# Patient Record
Sex: FEMALE | Race: WHITE | NOT HISPANIC OR LATINO | Employment: OTHER | URBAN - METROPOLITAN AREA
[De-identification: names, ages, dates, MRNs, and addresses within clinical notes are randomized per-mention and may not be internally consistent; named-entity substitution may affect disease eponyms.]

---

## 2018-12-23 ENCOUNTER — HOSPITAL ENCOUNTER (OUTPATIENT)
Dept: URGENT CARE | Facility: CLINIC | Age: 63
Setting detail: SPECIMEN
Discharge: HOME | End: 2018-12-23
Attending: FAMILY MEDICINE | Admitting: FAMILY MEDICINE

## 2018-12-23 LAB
AMPICILLIN SUSC ISLT: NORMAL
AZTREONAM SUSC ISLT: NORMAL
AZTREONAM SUSC ISLT: NORMAL
BACTERIA ISLT: NORMAL
BACTERIA ISLT: NORMAL
BACTERIA SPEC AEROBE CULT: NORMAL
BACTERIA SPEC AEROBE CULT: NORMAL
CEFAZOLIN SUSC ISLT: NORMAL
CEFAZOLIN SUSC ISLT: NORMAL
CEFEPIME SUSC ISLT: NORMAL
CEFEPIME SUSC ISLT: NORMAL
CEFTRIAXONE SUSC ISLT: NORMAL
CEFTRIAXONE SUSC ISLT: NORMAL
CIPROFLOXACIN SUSC ISLT: NORMAL
CIPROFLOXACIN SUSC ISLT: NORMAL
COLONY COUNT: NORMAL
LEVOFLOXACIN SUSC ISLT: NORMAL
LEVOFLOXACIN SUSC ISLT: NORMAL
Lab: NORMAL
MEROPENEM SUSC ISLT: NORMAL
MEROPENEM SUSC ISLT: NORMAL
MICRO REPORT STATUS: NORMAL
NITROFURANTOIN SUSC ISLT: NORMAL
NITROFURANTOIN SUSC ISLT: NORMAL
PIP+TAZO SUSC ISLT: NORMAL
PIP+TAZO SUSC ISLT: NORMAL
SPECIMEN SOURCE: NORMAL
SUSC METH SPEC: NORMAL
SUSC METH SPEC: NORMAL
TETRACYCLINE SUSC ISLT: NORMAL
TETRACYCLINE SUSC ISLT: NORMAL
TOBRAMYCIN SUSC ISLT: NORMAL
TOBRAMYCIN SUSC ISLT: NORMAL
TRIMETHOPRIM/SULFA: NORMAL
TRIMETHOPRIM/SULFA: NORMAL

## 2019-01-24 ENCOUNTER — HOSPITAL ENCOUNTER (OUTPATIENT)
Dept: OTHER | Facility: HOSPITAL | Age: 64
Setting detail: SPECIMEN
Discharge: HOME | End: 2019-01-24
Attending: FAMILY MEDICINE | Admitting: FAMILY MEDICINE

## 2019-01-24 LAB
ALBUMIN SERPL-MCNC: 4.2 G/DL (ref 3.5–4.8)
ALBUMIN/GLOB SERPL: 1.8 {RATIO} (ref 1–1.7)
ALP SERPL-CCNC: 69 IU/L (ref 32–91)
ALT SERPL-CCNC: 22 IU/L (ref 14–54)
ANION GAP SERPL CALC-SCNC: 13.8 MMOL/L (ref 10–20)
AST SERPL-CCNC: 27 IU/L (ref 15–41)
BASOPHILS # BLD AUTO: 0 10*3/UL (ref 0–0.2)
BASOPHILS NFR BLD AUTO: 1 % (ref 0–2)
BILIRUB SERPL-MCNC: 1.2 MG/DL (ref 0.3–1.2)
BUN SERPL-MCNC: 13 MG/DL (ref 8–20)
BUN/CREAT SERPL: 18.6 (ref 5.4–26.2)
CALCIUM SERPL-MCNC: 9.2 MG/DL (ref 8.9–10.3)
CHLORIDE SERPL-SCNC: 100 MMOL/L (ref 101–111)
CONV CO2: 26 MMOL/L (ref 22–32)
CONV TOTAL PROTEIN: 6.6 G/DL (ref 6.1–7.9)
CREAT UR-MCNC: 0.7 MG/DL (ref 0.4–1)
DIFFERENTIAL METHOD BLD: (no result)
EOSINOPHIL # BLD AUTO: 0.1 10*3/UL (ref 0–0.3)
EOSINOPHIL # BLD AUTO: 2 % (ref 0–3)
ERYTHROCYTE [DISTWIDTH] IN BLOOD BY AUTOMATED COUNT: 14.4 % (ref 11.5–14.5)
GLOBULIN UR ELPH-MCNC: 2.4 G/DL (ref 2.5–3.8)
GLUCOSE SERPL-MCNC: 154 MG/DL (ref 65–99)
HCT VFR BLD AUTO: 40.8 % (ref 35–49)
HGB BLD-MCNC: 13.4 G/DL (ref 12–15)
LYMPHOCYTES # BLD AUTO: 1.1 10*3/UL (ref 0.8–4.8)
LYMPHOCYTES NFR BLD AUTO: 22 % (ref 18–42)
MAGNESIUM SERPL-MCNC: 1.8 MG/DL (ref 1.8–2.5)
MCH RBC QN AUTO: 27.4 PG (ref 26–32)
MCHC RBC AUTO-ENTMCNC: 32.9 G/DL (ref 32–36)
MCV RBC AUTO: 83.3 FL (ref 80–94)
MONOCYTES # BLD AUTO: 0.3 10*3/UL (ref 0.1–1.3)
MONOCYTES NFR BLD AUTO: 6 % (ref 2–11)
NEUTROPHILS # BLD AUTO: 3.5 10*3/UL (ref 2.3–8.6)
NEUTROPHILS NFR BLD AUTO: 69 % (ref 50–75)
NRBC BLD AUTO-RTO: 0 /100{WBCS}
NRBC/RBC NFR BLD MANUAL: 0 10*3/UL
PLATELET # BLD AUTO: 194 10*3/UL (ref 150–450)
PMV BLD AUTO: 9.4 FL (ref 7.4–10.4)
POTASSIUM SERPL-SCNC: 3.8 MMOL/L (ref 3.6–5.1)
RBC # BLD AUTO: 4.9 10*6/UL (ref 4–5.4)
SODIUM SERPL-SCNC: 136 MMOL/L (ref 136–144)
WBC # BLD AUTO: 5 10*3/UL (ref 4.5–11.5)

## 2019-02-05 ENCOUNTER — HOSPITAL ENCOUNTER (OUTPATIENT)
Dept: OTHER | Facility: HOSPITAL | Age: 64
Discharge: HOME | End: 2019-02-05
Attending: FAMILY MEDICINE | Admitting: FAMILY MEDICINE

## 2019-03-01 ENCOUNTER — OFFICE (AMBULATORY)
Dept: URBAN - METROPOLITAN AREA PATHOLOGY 4 | Facility: PATHOLOGY | Age: 64
End: 2019-03-01

## 2019-03-01 ENCOUNTER — ON CAMPUS - OUTPATIENT (AMBULATORY)
Dept: URBAN - METROPOLITAN AREA HOSPITAL 2 | Facility: HOSPITAL | Age: 64
End: 2019-03-01

## 2019-03-01 ENCOUNTER — HOSPITAL ENCOUNTER (OUTPATIENT)
Dept: OTHER | Facility: HOSPITAL | Age: 64
Setting detail: SPECIMEN
Discharge: HOME | End: 2019-03-01
Attending: INTERNAL MEDICINE | Admitting: INTERNAL MEDICINE

## 2019-03-01 VITALS
DIASTOLIC BLOOD PRESSURE: 60 MMHG | SYSTOLIC BLOOD PRESSURE: 116 MMHG | SYSTOLIC BLOOD PRESSURE: 122 MMHG | DIASTOLIC BLOOD PRESSURE: 65 MMHG | DIASTOLIC BLOOD PRESSURE: 78 MMHG | WEIGHT: 135 LBS | OXYGEN SATURATION: 98 % | SYSTOLIC BLOOD PRESSURE: 101 MMHG | OXYGEN SATURATION: 99 % | SYSTOLIC BLOOD PRESSURE: 132 MMHG | OXYGEN SATURATION: 94 % | DIASTOLIC BLOOD PRESSURE: 59 MMHG | DIASTOLIC BLOOD PRESSURE: 61 MMHG | HEART RATE: 80 BPM | RESPIRATION RATE: 17 BRPM | HEART RATE: 75 BPM | HEART RATE: 89 BPM | DIASTOLIC BLOOD PRESSURE: 68 MMHG | SYSTOLIC BLOOD PRESSURE: 156 MMHG | OXYGEN SATURATION: 100 % | HEART RATE: 79 BPM | TEMPERATURE: 97.7 F | HEART RATE: 96 BPM | DIASTOLIC BLOOD PRESSURE: 72 MMHG | RESPIRATION RATE: 16 BRPM | OXYGEN SATURATION: 95 % | DIASTOLIC BLOOD PRESSURE: 66 MMHG | OXYGEN SATURATION: 97 % | SYSTOLIC BLOOD PRESSURE: 96 MMHG | SYSTOLIC BLOOD PRESSURE: 118 MMHG | SYSTOLIC BLOOD PRESSURE: 125 MMHG | HEIGHT: 63 IN

## 2019-03-01 DIAGNOSIS — K63.5 POLYP OF COLON: ICD-10-CM

## 2019-03-01 DIAGNOSIS — Z80.0 FAMILY HISTORY OF MALIGNANT NEOPLASM OF DIGESTIVE ORGANS: ICD-10-CM

## 2019-03-01 DIAGNOSIS — K63.3 ULCER OF INTESTINE: ICD-10-CM

## 2019-03-01 DIAGNOSIS — K62.1 RECTAL POLYP: ICD-10-CM

## 2019-03-01 DIAGNOSIS — K64.1 SECOND DEGREE HEMORRHOIDS: ICD-10-CM

## 2019-03-01 PROBLEM — D12.4 BENIGN NEOPLASM OF DESCENDING COLON: Status: ACTIVE | Noted: 2019-03-01

## 2019-03-01 LAB
GI HISTOLOGY: A. UNSPECIFIED: (no result)
GI HISTOLOGY: B. UNSPECIFIED: (no result)
GI HISTOLOGY: C. UNSPECIFIED: (no result)
GI HISTOLOGY: PDF REPORT: (no result)

## 2019-03-01 PROCEDURE — 45380 COLONOSCOPY AND BIOPSY: CPT | Mod: 59,PT | Performed by: INTERNAL MEDICINE

## 2019-03-01 PROCEDURE — 45380 COLONOSCOPY AND BIOPSY: CPT | Mod: PT,59 | Performed by: INTERNAL MEDICINE

## 2019-03-01 PROCEDURE — 45385 COLONOSCOPY W/LESION REMOVAL: CPT | Mod: PT | Performed by: INTERNAL MEDICINE

## 2019-03-01 PROCEDURE — 88305 TISSUE EXAM BY PATHOLOGIST: CPT | Mod: 26 | Performed by: INTERNAL MEDICINE

## 2019-04-24 ENCOUNTER — HOSPITAL ENCOUNTER (OUTPATIENT)
Dept: OTHER | Facility: HOSPITAL | Age: 64
Setting detail: SPECIMEN
Discharge: HOME | End: 2019-04-24
Attending: NURSE PRACTITIONER | Admitting: NURSE PRACTITIONER

## 2019-04-24 LAB
BASOPHILS # BLD AUTO: 0 10*3/UL (ref 0–0.2)
BASOPHILS NFR BLD AUTO: 0 % (ref 0–2)
DIFFERENTIAL METHOD BLD: (no result)
EOSINOPHIL # BLD AUTO: 0.1 10*3/UL (ref 0–0.3)
EOSINOPHIL # BLD AUTO: 2 % (ref 0–3)
ERYTHROCYTE [DISTWIDTH] IN BLOOD BY AUTOMATED COUNT: 14 % (ref 11.5–14.5)
HCT VFR BLD AUTO: 40.9 % (ref 35–49)
HGB BLD-MCNC: 13.8 G/DL (ref 12–15)
LYMPHOCYTES # BLD AUTO: 1.4 10*3/UL (ref 0.8–4.8)
LYMPHOCYTES NFR BLD AUTO: 25 % (ref 18–42)
MCH RBC QN AUTO: 27.7 PG (ref 26–32)
MCHC RBC AUTO-ENTMCNC: 33.6 G/DL (ref 32–36)
MCV RBC AUTO: 82.5 FL (ref 80–94)
MONOCYTES # BLD AUTO: 0.4 10*3/UL (ref 0.1–1.3)
MONOCYTES NFR BLD AUTO: 6 % (ref 2–11)
NEUTROPHILS # BLD AUTO: 3.9 10*3/UL (ref 2.3–8.6)
NEUTROPHILS NFR BLD AUTO: 67 % (ref 50–75)
NRBC BLD AUTO-RTO: 0 /100{WBCS}
NRBC/RBC NFR BLD MANUAL: 0 10*3/UL
PLATELET # BLD AUTO: 218 10*3/UL (ref 150–450)
PMV BLD AUTO: 9.5 FL (ref 7.4–10.4)
RBC # BLD AUTO: 4.96 10*6/UL (ref 4–5.4)
WBC # BLD AUTO: 5.8 10*3/UL (ref 4.5–11.5)

## 2019-04-29 ENCOUNTER — HOSPITAL ENCOUNTER (OUTPATIENT)
Dept: ORTHOPEDIC SURGERY | Facility: CLINIC | Age: 64
Discharge: HOME | End: 2019-04-29
Attending: NEUROLOGICAL SURGERY | Admitting: NEUROLOGICAL SURGERY

## 2019-06-25 RX ORDER — NITROFURANTOIN 25; 75 MG/1; MG/1
100 CAPSULE ORAL 2 TIMES DAILY
Qty: 14 CAPSULE | Refills: 1 | Status: SHIPPED | OUTPATIENT
Start: 2019-06-25 | End: 2019-07-05

## 2019-06-25 RX ORDER — NITROFURANTOIN 25; 75 MG/1; MG/1
CAPSULE ORAL
Refills: 0 | Status: CANCELLED | OUTPATIENT
Start: 2019-06-25

## 2019-06-25 RX ORDER — NITROFURANTOIN 25; 75 MG/1; MG/1
CAPSULE ORAL
Refills: 0 | COMMUNITY
Start: 2019-04-17 | End: 2019-06-25 | Stop reason: SDUPTHER

## 2019-07-05 ENCOUNTER — OFFICE VISIT (OUTPATIENT)
Dept: FAMILY MEDICINE CLINIC | Facility: CLINIC | Age: 64
End: 2019-07-05

## 2019-07-05 ENCOUNTER — TELEPHONE (OUTPATIENT)
Dept: FAMILY MEDICINE CLINIC | Facility: CLINIC | Age: 64
End: 2019-07-05

## 2019-07-05 VITALS
BODY MASS INDEX: 24.2 KG/M2 | DIASTOLIC BLOOD PRESSURE: 84 MMHG | SYSTOLIC BLOOD PRESSURE: 161 MMHG | HEART RATE: 82 BPM | WEIGHT: 136.6 LBS | OXYGEN SATURATION: 98 % | HEIGHT: 63 IN

## 2019-07-05 DIAGNOSIS — R30.0 DYSURIA: Primary | ICD-10-CM

## 2019-07-05 DIAGNOSIS — R53.82 CHRONIC FATIGUE: ICD-10-CM

## 2019-07-05 DIAGNOSIS — Z00.00 ENCOUNTER FOR GENERAL ADULT MEDICAL EXAMINATION WITHOUT ABNORMAL FINDINGS: ICD-10-CM

## 2019-07-05 DIAGNOSIS — R73.03 PREDIABETES: ICD-10-CM

## 2019-07-05 DIAGNOSIS — E78.2 MIXED HYPERLIPIDEMIA: ICD-10-CM

## 2019-07-05 DIAGNOSIS — N39.0 RECURRENT UTI: ICD-10-CM

## 2019-07-05 DIAGNOSIS — E03.9 ACQUIRED HYPOTHYROIDISM: ICD-10-CM

## 2019-07-05 PROBLEM — N95.2 VAGINAL ATROPHY: Status: ACTIVE | Noted: 2017-11-01

## 2019-07-05 PROBLEM — M79.7 FIBROMYOSITIS: Status: ACTIVE | Noted: 2018-08-02

## 2019-07-05 PROBLEM — M54.17 LUMBOSACRAL RADICULOPATHY: Status: ACTIVE | Noted: 2019-03-27

## 2019-07-05 PROBLEM — M51.36 DEGENERATION OF INTERVERTEBRAL DISC OF LUMBAR REGION: Status: ACTIVE | Noted: 2019-04-29

## 2019-07-05 PROBLEM — M19.90 DEGENERATIVE ARTHRITIS: Status: ACTIVE | Noted: 2018-08-22

## 2019-07-05 PROBLEM — F32.A DEPRESSION: Status: ACTIVE | Noted: 2019-07-05

## 2019-07-05 PROBLEM — Z80.0 FAMILY HISTORY OF COLON CANCER: Status: ACTIVE | Noted: 2019-02-21

## 2019-07-05 PROBLEM — I47.1 SVT (SUPRAVENTRICULAR TACHYCARDIA) (HCC): Status: ACTIVE | Noted: 2019-07-05

## 2019-07-05 PROBLEM — R10.32 ABDOMINAL PAIN, LLQ: Status: ACTIVE | Noted: 2019-02-21

## 2019-07-05 PROBLEM — I47.10 SVT (SUPRAVENTRICULAR TACHYCARDIA): Status: ACTIVE | Noted: 2019-07-05

## 2019-07-05 PROBLEM — M51.369 DEGENERATION OF INTERVERTEBRAL DISC OF LUMBAR REGION: Status: ACTIVE | Noted: 2019-04-29

## 2019-07-05 PROBLEM — K46.9 ABDOMINAL HERNIA: Status: ACTIVE | Noted: 2019-07-05

## 2019-07-05 PROBLEM — F41.9 ANXIETY DISORDER: Status: ACTIVE | Noted: 2019-07-05

## 2019-07-05 PROBLEM — M54.40 ACUTE BACK PAIN WITH SCIATICA: Status: ACTIVE | Noted: 2018-08-22

## 2019-07-05 PROBLEM — R92.8 OTHER ABNORMAL AND INCONCLUSIVE FINDINGS ON DIAGNOSTIC IMAGING OF BREAST: Status: ACTIVE | Noted: 2018-10-10

## 2019-07-05 PROBLEM — J30.2 SEASONAL ALLERGIES: Status: ACTIVE | Noted: 2019-07-05

## 2019-07-05 PROBLEM — G56.00 CARPAL TUNNEL SYNDROME: Status: ACTIVE | Noted: 2017-10-20

## 2019-07-05 PROBLEM — M19.90 ARTHRITIS: Status: ACTIVE | Noted: 2019-02-05

## 2019-07-05 PROBLEM — I77.9 CAROTID ARTERIAL DISEASE (HCC): Status: ACTIVE | Noted: 2019-04-24

## 2019-07-05 LAB
BILIRUB BLD-MCNC: NEGATIVE MG/DL
CLARITY, POC: CLEAR
COLOR UR: ABNORMAL
GLUCOSE UR STRIP-MCNC: NEGATIVE MG/DL
KETONES UR QL: NEGATIVE
LEUKOCYTE EST, POC: ABNORMAL
NITRITE UR-MCNC: POSITIVE MG/ML
PH UR: 7.5 [PH] (ref 5–8)
PROT UR STRIP-MCNC: NEGATIVE MG/DL
RBC # UR STRIP: ABNORMAL /UL
SP GR UR: 1.01 (ref 1–1.03)
UROBILINOGEN UR QL: NORMAL

## 2019-07-05 PROCEDURE — 87086 URINE CULTURE/COLONY COUNT: CPT | Performed by: FAMILY MEDICINE

## 2019-07-05 PROCEDURE — 99214 OFFICE O/P EST MOD 30 MIN: CPT | Performed by: FAMILY MEDICINE

## 2019-07-05 PROCEDURE — 81002 URINALYSIS NONAUTO W/O SCOPE: CPT | Performed by: FAMILY MEDICINE

## 2019-07-05 PROCEDURE — 87186 SC STD MICRODIL/AGAR DIL: CPT | Performed by: FAMILY MEDICINE

## 2019-07-05 RX ORDER — METFORMIN HYDROCHLORIDE 500 MG/1
500 TABLET, EXTENDED RELEASE ORAL 2 TIMES DAILY
Refills: 0 | COMMUNITY
Start: 2019-05-31 | End: 2019-11-08 | Stop reason: SDUPTHER

## 2019-07-05 RX ORDER — DULOXETIN HYDROCHLORIDE 60 MG/1
60 CAPSULE, DELAYED RELEASE ORAL DAILY
Refills: 0 | COMMUNITY
Start: 2019-04-15 | End: 2019-10-01 | Stop reason: SDUPTHER

## 2019-07-05 RX ORDER — TRAMADOL HYDROCHLORIDE 50 MG/1
TABLET ORAL
Refills: 0 | COMMUNITY
Start: 2019-06-10 | End: 2019-12-23

## 2019-07-05 RX ORDER — AMOXICILLIN AND CLAVULANATE POTASSIUM 500; 125 MG/1; MG/1
1 TABLET, FILM COATED ORAL 2 TIMES DAILY
Qty: 20 TABLET | Refills: 1 | Status: SHIPPED | OUTPATIENT
Start: 2019-07-05 | End: 2019-07-09 | Stop reason: ALTCHOICE

## 2019-07-05 RX ORDER — ATORVASTATIN CALCIUM 40 MG/1
TABLET, FILM COATED ORAL
Refills: 1 | COMMUNITY
Start: 2019-06-15 | End: 2020-01-10 | Stop reason: SDUPTHER

## 2019-07-05 RX ORDER — PANTOPRAZOLE SODIUM 40 MG/1
TABLET, DELAYED RELEASE ORAL
Refills: 0 | COMMUNITY
Start: 2019-04-18 | End: 2019-08-23

## 2019-07-05 RX ORDER — PHENAZOPYRIDINE HYDROCHLORIDE 100 MG/1
100 TABLET, FILM COATED ORAL 3 TIMES DAILY PRN
Refills: 0 | COMMUNITY
Start: 2019-04-17 | End: 2020-07-01

## 2019-07-05 RX ORDER — LANCETS 33 GAUGE
EACH MISCELLANEOUS
COMMUNITY
Start: 2018-08-02 | End: 2023-02-16

## 2019-07-05 RX ORDER — TRIAMCINOLONE ACETONIDE 0.1 %
1 PASTE (GRAM) DENTAL
COMMUNITY
Start: 2017-01-25

## 2019-07-05 RX ORDER — FLUCONAZOLE 200 MG/1
200 TABLET ORAL DAILY
Refills: 0 | COMMUNITY
Start: 2019-04-28 | End: 2019-10-11

## 2019-07-05 RX ORDER — MULTIVITAMIN
TABLET ORAL
COMMUNITY
Start: 2013-12-22

## 2019-07-05 NOTE — TELEPHONE ENCOUNTER
Patient wants me to remind you that you were calling in an antibiotic for her to  today for the infection.

## 2019-07-05 NOTE — PROGRESS NOTES
"Subjective   Emilee Gandara is a 63 y.o. female.     Pt in for F/U recurrent UTI's w current Dysuria and Frequency.  Also having problems w chronic low back pain for DDD.  Also c/o worsening of chronic Fatigue.    /84   Pulse 82   Ht 160 cm (62.99\")   Wt 62 kg (136 lb 9.6 oz)   SpO2 98%   BMI 24.20 kg/m²      The following portions of the patient's history were reviewed and updated as appropriate: allergies, current medications, past family history, past medical history, past social history, past surgical history and problem list.    Review of Systems   Constitutional: Positive for fatigue.   HENT: Negative.    Eyes: Negative.    Respiratory: Negative.    Cardiovascular: Positive for palpitations. Negative for chest pain and leg swelling.        HBP/HLD.  Hx of Arrythmia.   Gastrointestinal: Negative.         Colonoscopy UTD.   Endocrine: Negative.    Genitourinary: Positive for dysuria and frequency.        Hx of recurrent UTI.   Musculoskeletal: Positive for arthralgias, back pain and myalgias.   Skin: Negative.    Allergic/Immunologic: Negative.    Neurological: Negative.    Hematological: Negative.    Psychiatric/Behavioral: Negative.        Objective   Physical Exam   Constitutional: She is oriented to person, place, and time. She appears well-developed and well-nourished. No distress.   HENT:   Head: Normocephalic and atraumatic.   Nose: Nose normal.   Mouth/Throat: Oropharynx is clear and moist.   Eyes: Conjunctivae and EOM are normal. Pupils are equal, round, and reactive to light.   Neck: Normal range of motion. Neck supple. No thyromegaly present.   Cardiovascular: Normal rate, regular rhythm, normal heart sounds and intact distal pulses. Exam reveals no gallop.   Pulmonary/Chest: Effort normal. No respiratory distress. She has no wheezes. She has no rales.   Abdominal: Soft. Bowel sounds are normal. She exhibits no distension and no mass. There is no tenderness. No hernia.   Genitourinary: "   Genitourinary Comments: Mild suprapubic tenderness.   Musculoskeletal: She exhibits tenderness.   Mod LS tenderness and pain on ROM.  LE Str and Sens intact.   Lymphadenopathy:     She has no cervical adenopathy.   Neurological: She is alert and oriented to person, place, and time. No cranial nerve deficit or sensory deficit. She exhibits normal muscle tone.   Skin: Skin is warm and dry. Capillary refill takes 2 to 3 seconds. Rash noted.   Psychiatric: She has a normal mood and affect. Her behavior is normal. Judgment and thought content normal.       Assessment/Plan   Emilee was seen today for urinary tract infection.    Diagnoses and all orders for this visit:    Dysuria  -     POC Urinalysis Dipstick  -     Urine Culture - Urine, Urine, Random Void  -     Ambulatory Referral to Urology    Recurrent UTI  -     Ambulatory Referral to Urology    Encounter for general adult medical examination without abnormal findings  -     CBC & Differential  -     Comprehensive Metabolic Panel  -     Hemoglobin A1c  -     Lipid Panel  -     T4, Free  -     TSH  -     Vitamin D 1,25 Dihydroxy  -     Vitamin B12  -     CBC Auto Differential    Chronic fatigue  -     CBC & Differential  -     Comprehensive Metabolic Panel  -     Hemoglobin A1c  -     Lipid Panel  -     T4, Free  -     TSH  -     Vitamin D 1,25 Dihydroxy  -     Vitamin B12  -     CBC Auto Differential    Mixed hyperlipidemia  -     Comprehensive Metabolic Panel  -     Lipid Panel    Acquired hypothyroidism  -     T4, Free  -     TSH    Prediabetes   -     Hemoglobin A1c    Other orders  -     amoxicillin-clavulanate (AUGMENTIN) 500-125 MG per tablet; Take 1 tablet by mouth 2 (Two) Times a Day.

## 2019-07-05 NOTE — PATIENT INSTRUCTIONS
Ck Labs as noted.  F/U as indicated. Meds reviewed w adjust prn.  Urology referral for recurrent UTI's.  PM/Inj F/U for Chronic Back Pain.  ? Ortho Referral for R Shouder Pain and Clicking.  [facility] F/U prn or in 3 mo.

## 2019-07-07 LAB — BACTERIA SPEC AEROBE CULT: ABNORMAL

## 2019-07-09 ENCOUNTER — TELEPHONE (OUTPATIENT)
Dept: FAMILY MEDICINE CLINIC | Facility: CLINIC | Age: 64
End: 2019-07-09

## 2019-07-09 RX ORDER — CEPHALEXIN 500 MG/1
500 CAPSULE ORAL 3 TIMES DAILY
Qty: 21 CAPSULE | Refills: 1 | Status: SHIPPED | OUTPATIENT
Start: 2019-07-09 | End: 2019-10-11

## 2019-07-16 ENCOUNTER — OFFICE (AMBULATORY)
Dept: URBAN - METROPOLITAN AREA CLINIC 64 | Facility: CLINIC | Age: 64
End: 2019-07-16

## 2019-07-16 VITALS
SYSTOLIC BLOOD PRESSURE: 132 MMHG | HEIGHT: 63 IN | HEART RATE: 85 BPM | WEIGHT: 136 LBS | DIASTOLIC BLOOD PRESSURE: 79 MMHG

## 2019-07-16 DIAGNOSIS — R10.32 LEFT LOWER QUADRANT PAIN: ICD-10-CM

## 2019-07-16 DIAGNOSIS — K59.00 CONSTIPATION, UNSPECIFIED: ICD-10-CM

## 2019-07-16 DIAGNOSIS — R19.5 OTHER FECAL ABNORMALITIES: ICD-10-CM

## 2019-07-16 DIAGNOSIS — R10.31 RIGHT LOWER QUADRANT PAIN: ICD-10-CM

## 2019-07-16 PROCEDURE — 99214 OFFICE O/P EST MOD 30 MIN: CPT | Performed by: INTERNAL MEDICINE

## 2019-07-22 ENCOUNTER — TRANSCRIBE ORDERS (OUTPATIENT)
Dept: ADMINISTRATIVE | Facility: HOSPITAL | Age: 64
End: 2019-07-22

## 2019-07-22 DIAGNOSIS — R10.32 LEFT LOWER QUADRANT PAIN: Primary | ICD-10-CM

## 2019-07-24 ENCOUNTER — ON CAMPUS - OUTPATIENT (AMBULATORY)
Dept: URBAN - METROPOLITAN AREA HOSPITAL 2 | Facility: HOSPITAL | Age: 64
End: 2019-07-24

## 2019-07-24 ENCOUNTER — OFFICE (AMBULATORY)
Dept: URBAN - METROPOLITAN AREA PATHOLOGY 4 | Facility: PATHOLOGY | Age: 64
End: 2019-07-24

## 2019-07-24 VITALS
DIASTOLIC BLOOD PRESSURE: 71 MMHG | HEART RATE: 77 BPM | SYSTOLIC BLOOD PRESSURE: 128 MMHG | OXYGEN SATURATION: 97 % | DIASTOLIC BLOOD PRESSURE: 98 MMHG | HEART RATE: 94 BPM | SYSTOLIC BLOOD PRESSURE: 139 MMHG | OXYGEN SATURATION: 94 % | SYSTOLIC BLOOD PRESSURE: 122 MMHG | OXYGEN SATURATION: 95 % | OXYGEN SATURATION: 100 % | SYSTOLIC BLOOD PRESSURE: 181 MMHG | SYSTOLIC BLOOD PRESSURE: 115 MMHG | DIASTOLIC BLOOD PRESSURE: 77 MMHG | RESPIRATION RATE: 16 BRPM | HEIGHT: 63 IN | DIASTOLIC BLOOD PRESSURE: 84 MMHG | TEMPERATURE: 97.6 F | HEART RATE: 81 BPM | OXYGEN SATURATION: 98 % | WEIGHT: 136 LBS | DIASTOLIC BLOOD PRESSURE: 72 MMHG | HEART RATE: 82 BPM | RESPIRATION RATE: 18 BRPM | HEART RATE: 78 BPM | RESPIRATION RATE: 20 BRPM

## 2019-07-24 DIAGNOSIS — R11.0 NAUSEA: ICD-10-CM

## 2019-07-24 DIAGNOSIS — K31.89 OTHER DISEASES OF STOMACH AND DUODENUM: ICD-10-CM

## 2019-07-24 DIAGNOSIS — R19.5 OTHER FECAL ABNORMALITIES: ICD-10-CM

## 2019-07-24 DIAGNOSIS — K22.2 ESOPHAGEAL OBSTRUCTION: ICD-10-CM

## 2019-07-24 DIAGNOSIS — R10.12 LEFT UPPER QUADRANT PAIN: ICD-10-CM

## 2019-07-24 PROBLEM — K29.70 GASTRITIS, UNSPECIFIED, WITHOUT BLEEDING: Status: ACTIVE | Noted: 2019-07-24

## 2019-07-24 LAB
ALBUMIN SERPL-MCNC: 4.3 G/DL (ref 3.5–4.8)
ALBUMIN/GLOB SERPL: 1.5 G/DL (ref 1–1.7)
ALP SERPL-CCNC: 73 U/L (ref 32–91)
ALT SERPL W P-5'-P-CCNC: 24 U/L (ref 14–54)
ANION GAP SERPL CALCULATED.3IONS-SCNC: 14.7 MMOL/L (ref 5–15)
ARTICHOKE IGE QN: 90 MG/DL (ref 0–100)
AST SERPL-CCNC: 21 U/L (ref 15–41)
BASOPHILS # BLD AUTO: 0 10*3/MM3 (ref 0–0.2)
BASOPHILS NFR BLD AUTO: 0.3 % (ref 0–1.5)
BILIRUB SERPL-MCNC: 1.6 MG/DL (ref 0.3–1.2)
BUN BLD-MCNC: 10 MG/DL (ref 8–20)
BUN/CREAT SERPL: 16.7 (ref 5.4–26.2)
CALCIUM SPEC-SCNC: 9.5 MG/DL (ref 8.9–10.3)
CHLORIDE SERPL-SCNC: 104 MMOL/L (ref 101–111)
CHOLEST SERPL-MCNC: 164 MG/DL
CO2 SERPL-SCNC: 27 MMOL/L (ref 22–32)
CREAT BLD-MCNC: 0.6 MG/DL (ref 0.4–1)
DEPRECATED RDW RBC AUTO: 40.7 FL (ref 37–54)
EOSINOPHIL # BLD AUTO: 0.1 10*3/MM3 (ref 0–0.4)
EOSINOPHIL NFR BLD AUTO: 1.1 % (ref 0.3–6.2)
ERYTHROCYTE [DISTWIDTH] IN BLOOD BY AUTOMATED COUNT: 14 % (ref 12.3–15.4)
GFR SERPL CREATININE-BSD FRML MDRD: 100 ML/MIN/1.73
GI HISTOLOGY: A. SELECT: (no result)
GI HISTOLOGY: B. UNSPECIFIED: (no result)
GI HISTOLOGY: PDF REPORT: (no result)
GLOBULIN UR ELPH-MCNC: 2.8 GM/DL (ref 2.5–3.8)
GLUCOSE BLD-MCNC: 100 MG/DL (ref 65–99)
HCT VFR BLD AUTO: 41.9 % (ref 34–46.6)
HDLC SERPL QL: 2.56
HDLC SERPL-MCNC: 64 MG/DL
HGB BLD-MCNC: 14 G/DL (ref 12–15.9)
LDLC/HDLC SERPL: 1.35 {RATIO}
LYMPHOCYTES # BLD AUTO: 1.6 10*3/MM3 (ref 0.7–3.1)
LYMPHOCYTES NFR BLD AUTO: 21.1 % (ref 19.6–45.3)
MCH RBC QN AUTO: 27.8 PG (ref 26.6–33)
MCHC RBC AUTO-ENTMCNC: 33.4 G/DL (ref 31.5–35.7)
MCV RBC AUTO: 83.2 FL (ref 79–97)
MONOCYTES # BLD AUTO: 0.5 10*3/MM3 (ref 0.1–0.9)
MONOCYTES NFR BLD AUTO: 6.4 % (ref 5–12)
NEUTROPHILS # BLD AUTO: 5.6 10*3/MM3 (ref 1.7–7)
NEUTROPHILS NFR BLD AUTO: 71.1 % (ref 42.7–76)
NRBC BLD AUTO-RTO: 0 /100 WBC (ref 0–0.2)
PLATELET # BLD AUTO: 254 10*3/MM3 (ref 140–450)
PMV BLD AUTO: 9 FL (ref 6–12)
POTASSIUM BLD-SCNC: 4.7 MMOL/L (ref 3.6–5.1)
PROT SERPL-MCNC: 7.1 G/DL (ref 6.1–7.9)
RBC # BLD AUTO: 5.04 10*6/MM3 (ref 3.77–5.28)
SODIUM BLD-SCNC: 141 MMOL/L (ref 136–144)
T4 FREE SERPL-MCNC: 0.75 NG/DL (ref 0.58–1.64)
TRIGL SERPL-MCNC: 67 MG/DL
TSH SERPL DL<=0.05 MIU/L-ACNC: 0.54 MIU/ML (ref 0.34–5.6)
VIT B12 BLD-MCNC: 585 PG/ML (ref 180–914)
VLDLC SERPL-MCNC: 13.4 MG/DL
WBC NRBC COR # BLD: 7.8 10*3/MM3 (ref 3.4–10.8)

## 2019-07-24 PROCEDURE — 88305 TISSUE EXAM BY PATHOLOGIST: CPT | Performed by: INTERNAL MEDICINE

## 2019-07-24 PROCEDURE — 80053 COMPREHEN METABOLIC PANEL: CPT | Performed by: FAMILY MEDICINE

## 2019-07-24 PROCEDURE — 82652 VIT D 1 25-DIHYDROXY: CPT | Performed by: FAMILY MEDICINE

## 2019-07-24 PROCEDURE — 83036 HEMOGLOBIN GLYCOSYLATED A1C: CPT | Performed by: FAMILY MEDICINE

## 2019-07-24 PROCEDURE — 85025 COMPLETE CBC W/AUTO DIFF WBC: CPT | Performed by: FAMILY MEDICINE

## 2019-07-24 PROCEDURE — 84443 ASSAY THYROID STIM HORMONE: CPT | Performed by: FAMILY MEDICINE

## 2019-07-24 PROCEDURE — 43239 EGD BIOPSY SINGLE/MULTIPLE: CPT | Performed by: INTERNAL MEDICINE

## 2019-07-24 PROCEDURE — 80061 LIPID PANEL: CPT | Performed by: FAMILY MEDICINE

## 2019-07-24 PROCEDURE — 84439 ASSAY OF FREE THYROXINE: CPT | Performed by: FAMILY MEDICINE

## 2019-07-24 PROCEDURE — 88305 TISSUE EXAM BY PATHOLOGIST: CPT | Mod: 26 | Performed by: INTERNAL MEDICINE

## 2019-07-24 PROCEDURE — 43450 DILATE ESOPHAGUS 1/MULT PASS: CPT | Performed by: INTERNAL MEDICINE

## 2019-07-24 PROCEDURE — 82607 VITAMIN B-12: CPT | Performed by: FAMILY MEDICINE

## 2019-07-25 ENCOUNTER — LAB REQUISITION (OUTPATIENT)
Dept: LAB | Facility: HOSPITAL | Age: 64
End: 2019-07-25

## 2019-07-25 DIAGNOSIS — K29.70 GASTRITIS WITHOUT BLEEDING: ICD-10-CM

## 2019-07-25 DIAGNOSIS — R10.12 LEFT UPPER QUADRANT PAIN: ICD-10-CM

## 2019-07-25 DIAGNOSIS — R11.0 NAUSEA: ICD-10-CM

## 2019-07-25 DIAGNOSIS — K22.2 ESOPHAGEAL OBSTRUCTION: ICD-10-CM

## 2019-07-25 DIAGNOSIS — R19.5 OTHER FECAL ABNORMALITIES: ICD-10-CM

## 2019-07-25 LAB — HBA1C MFR BLD: 6.2 % (ref 3.5–5.6)

## 2019-07-26 ENCOUNTER — HOSPITAL ENCOUNTER (OUTPATIENT)
Dept: CT IMAGING | Facility: HOSPITAL | Age: 64
Discharge: HOME | End: 2019-07-26
Admitting: INTERNAL MEDICINE

## 2019-07-26 DIAGNOSIS — R10.32 LEFT LOWER QUADRANT PAIN: ICD-10-CM

## 2019-07-26 LAB
LAB AP CASE REPORT: NORMAL
PATH REPORT.FINAL DX SPEC: NORMAL
PATH REPORT.GROSS SPEC: NORMAL

## 2019-07-26 PROCEDURE — 0 IOPAMIDOL PER 1 ML: Performed by: INTERNAL MEDICINE

## 2019-07-26 PROCEDURE — 74177 CT ABD & PELVIS W/CONTRAST: CPT

## 2019-07-26 RX ADMIN — IOPAMIDOL 100 ML: 755 INJECTION, SOLUTION INTRAVENOUS at 12:15

## 2019-07-29 LAB — 1,25(OH)2D3 SERPL-MCNC: 68.9 PG/ML (ref 19.9–79.3)

## 2019-08-01 ENCOUNTER — TRANSCRIBE ORDERS (OUTPATIENT)
Dept: ADMINISTRATIVE | Facility: HOSPITAL | Age: 64
End: 2019-08-01

## 2019-08-01 DIAGNOSIS — R10.11 RUQ PAIN: Primary | ICD-10-CM

## 2019-08-08 ENCOUNTER — HOSPITAL ENCOUNTER (OUTPATIENT)
Dept: NUCLEAR MEDICINE | Facility: HOSPITAL | Age: 64
Discharge: HOME | End: 2019-08-08

## 2019-08-08 ENCOUNTER — HOSPITAL ENCOUNTER (OUTPATIENT)
Dept: ULTRASOUND IMAGING | Facility: HOSPITAL | Age: 64
Discharge: HOME | End: 2019-08-08
Admitting: INTERNAL MEDICINE

## 2019-08-08 DIAGNOSIS — R10.11 RUQ PAIN: ICD-10-CM

## 2019-08-08 PROCEDURE — 76705 ECHO EXAM OF ABDOMEN: CPT

## 2019-08-23 ENCOUNTER — OFFICE VISIT (OUTPATIENT)
Dept: FAMILY MEDICINE CLINIC | Facility: CLINIC | Age: 64
End: 2019-08-23

## 2019-08-23 VITALS
HEIGHT: 63 IN | BODY MASS INDEX: 23.57 KG/M2 | SYSTOLIC BLOOD PRESSURE: 136 MMHG | HEART RATE: 88 BPM | OXYGEN SATURATION: 98 % | WEIGHT: 133 LBS | DIASTOLIC BLOOD PRESSURE: 80 MMHG

## 2019-08-23 DIAGNOSIS — M79.7 FIBROMYALGIA: ICD-10-CM

## 2019-08-23 DIAGNOSIS — F06.4 ANXIETY DISORDER DUE TO KNOWN PHYSIOLOGICAL CONDITION: ICD-10-CM

## 2019-08-23 DIAGNOSIS — R10.32 ABDOMINAL PAIN, LLQ: Primary | ICD-10-CM

## 2019-08-23 DIAGNOSIS — K59.09 CHRONIC CONSTIPATION: ICD-10-CM

## 2019-08-23 PROCEDURE — 99213 OFFICE O/P EST LOW 20 MIN: CPT | Performed by: FAMILY MEDICINE

## 2019-08-23 RX ORDER — PREDNISONE 10 MG/1
TABLET ORAL
Qty: 30 TABLET | Refills: 0 | Status: SHIPPED | OUTPATIENT
Start: 2019-08-23 | End: 2019-10-11

## 2019-08-23 RX ORDER — NITROFURANTOIN 25; 75 MG/1; MG/1
100 CAPSULE ORAL 2 TIMES DAILY
Refills: 0 | COMMUNITY
Start: 2019-08-20 | End: 2019-10-11

## 2019-08-23 NOTE — PROGRESS NOTES
"Subjective   Emilee Gandara is a 63 y.o. female.     Pt in for F/U chronic abdominal pain and tenderness along w recurrent UTI Sx's.  Has seen both GI ([place]) for EGD and Colonoscopy w non-specific inflammatory changes.     /80   Pulse 88   Ht 160 cm (62.99\")   Wt 60.3 kg (133 lb)   SpO2 98%   BMI 23.57 kg/m²     The following portions of the patient's history were reviewed and updated as appropriate: allergies, current medications, past medical history, past surgical history and problem list.    Review of Systems   Constitutional: Negative.    Respiratory: Negative.    Cardiovascular:        Borderline HBP/HLD.   Gastrointestinal: Positive for abdominal distention, abdominal pain, constipation and diarrhea. Negative for vomiting.   Endocrine:        Borderline DM.   Genitourinary:        Hx of UTI's.   Skin: Negative.    Allergic/Immunologic: Negative.    Neurological: Negative.    Hematological: Negative.    Psychiatric/Behavioral: The patient is nervous/anxious.         Mild chronic anxiety.       Objective   Physical Exam   Constitutional: She is oriented to person, place, and time. She appears well-developed and well-nourished. No distress.   HENT:   Head: Normocephalic and atraumatic.   Nose: Nose normal.   Mouth/Throat: Oropharynx is clear and moist.   Eyes: Conjunctivae and EOM are normal. Pupils are equal, round, and reactive to light.   Neck: No thyromegaly present.   Cardiovascular: Normal rate, regular rhythm, normal heart sounds and intact distal pulses. Exam reveals no gallop.   No murmur heard.  Pulmonary/Chest: Effort normal and breath sounds normal. No respiratory distress. She has no wheezes. She has no rales.   Abdominal: Soft. Bowel sounds are normal. She exhibits no mass. There is tenderness. No hernia.   Musculoskeletal: She exhibits no tenderness or deformity.   Lymphadenopathy:     She has no cervical adenopathy.   Neurological: She is alert and oriented to person, place, and " time. No cranial nerve deficit or sensory deficit. She exhibits normal muscle tone. Coordination normal.   Skin: Skin is warm and dry. Capillary refill takes 2 to 3 seconds.   Psychiatric: Her behavior is normal. Judgment and thought content normal.   Mild chronic anxiety and depression.  No harm thoughts. Processes clear/   Nursing note and vitals reviewed.      Assessment/Plan   Emilee was seen today for abdominal pain.    Diagnoses and all orders for this visit:    Abdominal pain, LLQ    Chronic constipation    Fibromyalgia    Anxiety disorder due to known physiological condition    Other orders  -     predniSONE (DELTASONE) 10 MG tablet; Take 2 Tabs bid x 3 days, then 1 tab bid x 5 days, then 1 tab q d x 5 days, then 1 tab q.o.d.

## 2019-08-27 NOTE — PATIENT INSTRUCTIONS
Recent Labs and Imaging reviewed. Meds reviewed.  No changes at this time. Will try short course of Prednisone for anti-inflammatory effect.  Cont GI and  F/U as indicated.  PCP F/U prn or in 6 mo.

## 2019-08-29 ENCOUNTER — TELEPHONE (OUTPATIENT)
Dept: FAMILY MEDICINE CLINIC | Facility: CLINIC | Age: 64
End: 2019-08-29

## 2019-08-29 NOTE — TELEPHONE ENCOUNTER
Unfortunately can't do Tramadol Refill. Needs to find another PCP who can Rx sooner than later or ask GI if they will Rx.  Thanks.

## 2019-08-29 NOTE — TELEPHONE ENCOUNTER
Patient says that she has a few refills left on tramadol but would like more so she has time to find a new PCP. Recommendation?

## 2019-10-01 RX ORDER — DULOXETIN HYDROCHLORIDE 60 MG/1
60 CAPSULE, DELAYED RELEASE ORAL DAILY
Qty: 90 CAPSULE | Refills: 0 | Status: SHIPPED | OUTPATIENT
Start: 2019-10-01 | End: 2020-01-03 | Stop reason: SDUPTHER

## 2019-10-11 ENCOUNTER — OFFICE VISIT (OUTPATIENT)
Dept: FAMILY MEDICINE CLINIC | Facility: CLINIC | Age: 64
End: 2019-10-11

## 2019-10-11 VITALS
OXYGEN SATURATION: 97 % | SYSTOLIC BLOOD PRESSURE: 148 MMHG | HEIGHT: 63 IN | DIASTOLIC BLOOD PRESSURE: 93 MMHG | WEIGHT: 137 LBS | HEART RATE: 83 BPM | BODY MASS INDEX: 24.27 KG/M2

## 2019-10-11 DIAGNOSIS — M79.7 FIBROMYOSITIS: ICD-10-CM

## 2019-10-11 DIAGNOSIS — R30.0 DYSURIA: Primary | ICD-10-CM

## 2019-10-11 DIAGNOSIS — M79.10 MUSCLE PAIN: ICD-10-CM

## 2019-10-11 DIAGNOSIS — M54.17 LUMBOSACRAL RADICULOPATHY: ICD-10-CM

## 2019-10-11 DIAGNOSIS — L43.9 LICHEN PLANUS: ICD-10-CM

## 2019-10-11 LAB
BILIRUB BLD-MCNC: NEGATIVE MG/DL
CLARITY, POC: CLEAR
COLOR UR: YELLOW
CRP SERPL-MCNC: 0.04 MG/DL (ref 0–0.7)
GLUCOSE UR STRIP-MCNC: NEGATIVE MG/DL
KETONES UR QL: NEGATIVE
LEUKOCYTE EST, POC: ABNORMAL
NITRITE UR-MCNC: NEGATIVE MG/ML
PH UR: 6.5 [PH] (ref 5–8)
PROT UR STRIP-MCNC: NEGATIVE MG/DL
RBC # UR STRIP: NEGATIVE /UL
SP GR UR: 1 (ref 1–1.03)
UROBILINOGEN UR QL: NORMAL

## 2019-10-11 PROCEDURE — 87086 URINE CULTURE/COLONY COUNT: CPT | Performed by: NURSE PRACTITIONER

## 2019-10-11 PROCEDURE — 87147 CULTURE TYPE IMMUNOLOGIC: CPT | Performed by: NURSE PRACTITIONER

## 2019-10-11 PROCEDURE — 86038 ANTINUCLEAR ANTIBODIES: CPT | Performed by: NURSE PRACTITIONER

## 2019-10-11 PROCEDURE — 99214 OFFICE O/P EST MOD 30 MIN: CPT | Performed by: NURSE PRACTITIONER

## 2019-10-11 PROCEDURE — 86140 C-REACTIVE PROTEIN: CPT | Performed by: NURSE PRACTITIONER

## 2019-10-11 PROCEDURE — 81002 URINALYSIS NONAUTO W/O SCOPE: CPT | Performed by: NURSE PRACTITIONER

## 2019-10-11 PROCEDURE — 87186 SC STD MICRODIL/AGAR DIL: CPT | Performed by: NURSE PRACTITIONER

## 2019-10-11 RX ORDER — NITROFURANTOIN 25; 75 MG/1; MG/1
100 CAPSULE ORAL 2 TIMES DAILY
Qty: 20 CAPSULE | Refills: 0 | Status: SHIPPED | OUTPATIENT
Start: 2019-10-11 | End: 2020-01-08

## 2019-10-11 RX ORDER — ESTRADIOL 0.1 MG/G
CREAM VAGINAL
Refills: 0 | COMMUNITY
Start: 2019-08-05 | End: 2023-02-16

## 2019-10-11 NOTE — PROGRESS NOTES
Subjective   Emilee Gandara is a 64 y.o. female.     Patient presents today as a transfer of care.    She reports having several concerns.    She would like referral to autoimmune specialist. She reports muscle pain and low back pain. She has been diagnosed with fibromyalgia. She takes Tramadol daily.    She has been followed by GI. She continues to have abdominal cramping. She was given dicyclomine per GI without relief and she will follow up.    She reports some dysuria today and is concerned for UTI.      Patient was previously seeing Dr. Edwards to be checked for vascular abnormality in her neck.  She did not do CT due to iodine allergy.      She would like refer to ENT for lichen planus in her mouth.         The following portions of the patient's history were reviewed and updated as appropriate: allergies, current medications, past family history, past medical history, past social history, past surgical history and problem list.    Review of Systems   Constitutional: Negative for activity change, chills, diaphoresis, fatigue and fever.   HENT: Negative for congestion, ear pain, facial swelling, mouth sores, rhinorrhea, sinus pressure and sore throat.    Eyes: Negative for blurred vision, double vision, photophobia, pain and visual disturbance.   Respiratory: Negative for cough, choking, chest tightness, shortness of breath, wheezing and stridor.    Cardiovascular: Negative for chest pain, palpitations and leg swelling.   Gastrointestinal: Positive for abdominal pain. Negative for abdominal distention, anal bleeding, blood in stool, constipation, diarrhea, nausea, rectal pain, vomiting, GERD and indigestion.   Endocrine: Negative for polydipsia, polyphagia and polyuria.   Genitourinary: Positive for dysuria and urgency. Negative for difficulty urinating, frequency, hematuria and urinary incontinence.   Musculoskeletal: Positive for arthralgias, back pain and myalgias. Negative for neck pain.   Skin: Negative  for rash and skin lesions.   Neurological: Negative for dizziness, tremors, weakness, light-headedness and headache.   Psychiatric/Behavioral: Negative for agitation, behavioral problems, depressed mood and stress. The patient is not nervous/anxious.        Objective   Physical Exam   Constitutional: She is oriented to person, place, and time. She appears well-developed and well-nourished. No distress.   HENT:   Head: Normocephalic and atraumatic.   Right Ear: External ear normal.   Left Ear: External ear normal.   Nose: Nose normal.   Mouth/Throat: Oropharynx is clear and moist. No oropharyngeal exudate.   Eyes: Conjunctivae and EOM are normal. Pupils are equal, round, and reactive to light. Right eye exhibits no discharge. Left eye exhibits no discharge. No scleral icterus.   Neck: Normal range of motion. Neck supple. No JVD present. Carotid bruit is not present. No tracheal deviation present. No thyromegaly present.   Cardiovascular: Normal rate, regular rhythm, normal heart sounds and intact distal pulses. Exam reveals no gallop and no friction rub.   No murmur heard.  Pulmonary/Chest: Breath sounds normal. No stridor. No respiratory distress. She has no wheezes. She has no rales. She exhibits no tenderness.   Abdominal: Soft. Bowel sounds are normal. She exhibits no distension. There is no tenderness.   Musculoskeletal: Normal range of motion. She exhibits no edema, tenderness or deformity.   Lymphadenopathy:     She has no cervical adenopathy.   Neurological: She is alert and oriented to person, place, and time. No sensory deficit. She exhibits normal muscle tone. Coordination normal.   Skin: Skin is warm and dry. Capillary refill takes less than 2 seconds. No rash noted. She is not diaphoretic.   Psychiatric: She has a normal mood and affect. Her behavior is normal. Judgment and thought content normal.         Assessment/Plan   Emilee was seen today for urinary tract infection, follow-up and  referrals.    Diagnoses and all orders for this visit:    Dysuria  -     POC Urinalysis Dipstick  -     Urine Culture - Urine, Urine, Clean Catch  - Treat with macrobid and send for culture    Lichen planus  -     Ambulatory Referral to ENT (Otolaryngology)    Muscle pain  -     JASMIN  -     C-reactive Protein  -     Sedimentation rate, automated  -     Ambulatory Referral to Rheumatology   - R/o autoimmune cause, refer to rheumatology    Fibromyositis  -     Ambulatory Referral to Rheumatology  -     Ambulatory Referral to Pain Management  - Sample of Lyrica given    Lumbosacral radiculopathy  -     Ambulatory Referral to Pain Management    Other orders  -     nitrofurantoin, macrocrystal-monohydrate, (MACROBID) 100 MG capsule; Take 1 capsule by mouth 2 (Two) Times a Day.  - Patient will madonna Boo for possible records from vascular screening.

## 2019-10-13 LAB — BACTERIA SPEC AEROBE CULT: ABNORMAL

## 2019-10-14 LAB — ANA SER QL: NEGATIVE

## 2019-10-18 ENCOUNTER — TELEPHONE (OUTPATIENT)
Dept: FAMILY MEDICINE CLINIC | Facility: CLINIC | Age: 64
End: 2019-10-18

## 2019-10-21 NOTE — TELEPHONE ENCOUNTER
Here precious was negative and her CRP was normal. The sed rate has not yet resulted for some reason. Please call and see why.

## 2019-10-21 NOTE — TELEPHONE ENCOUNTER
The lab said they didn't have the order so I called the patient and she will be in to leave another sample next week.

## 2019-11-08 RX ORDER — METFORMIN HYDROCHLORIDE 500 MG/1
500 TABLET, EXTENDED RELEASE ORAL 2 TIMES DAILY
Qty: 60 TABLET | Refills: 2 | Status: SHIPPED | OUTPATIENT
Start: 2019-11-08 | End: 2020-01-10 | Stop reason: SDUPTHER

## 2019-11-12 ENCOUNTER — TELEPHONE (OUTPATIENT)
Dept: FAMILY MEDICINE CLINIC | Facility: CLINIC | Age: 64
End: 2019-11-12

## 2019-12-23 ENCOUNTER — APPOINTMENT (OUTPATIENT)
Dept: CT IMAGING | Facility: HOSPITAL | Age: 64
End: 2019-12-23

## 2019-12-23 ENCOUNTER — HOSPITAL ENCOUNTER (EMERGENCY)
Facility: HOSPITAL | Age: 64
Discharge: HOME | End: 2019-12-23
Admitting: EMERGENCY MEDICINE

## 2019-12-23 VITALS
SYSTOLIC BLOOD PRESSURE: 113 MMHG | HEIGHT: 64 IN | WEIGHT: 135.8 LBS | BODY MASS INDEX: 23.18 KG/M2 | TEMPERATURE: 97.4 F | DIASTOLIC BLOOD PRESSURE: 57 MMHG | OXYGEN SATURATION: 96 % | HEART RATE: 86 BPM | RESPIRATION RATE: 18 BRPM

## 2019-12-23 DIAGNOSIS — R31.9 URINARY TRACT INFECTION WITH HEMATURIA, SITE UNSPECIFIED: ICD-10-CM

## 2019-12-23 DIAGNOSIS — R10.9 FLANK PAIN: Primary | ICD-10-CM

## 2019-12-23 DIAGNOSIS — N39.0 URINARY TRACT INFECTION WITH HEMATURIA, SITE UNSPECIFIED: ICD-10-CM

## 2019-12-23 LAB
ANION GAP SERPL CALCULATED.3IONS-SCNC: 10 MMOL/L (ref 5–15)
BACTERIA UR QL AUTO: ABNORMAL /HPF
BASOPHILS # BLD AUTO: 0 10*3/MM3 (ref 0–0.2)
BASOPHILS NFR BLD AUTO: 0.5 % (ref 0–1.5)
BILIRUB UR QL STRIP: ABNORMAL
BUN BLD-MCNC: 12 MG/DL (ref 8–23)
BUN/CREAT SERPL: 18.2 (ref 7–25)
CALCIUM SPEC-SCNC: 9.4 MG/DL (ref 8.6–10.5)
CHLORIDE SERPL-SCNC: 103 MMOL/L (ref 98–107)
CLARITY UR: CLEAR
CO2 SERPL-SCNC: 28 MMOL/L (ref 22–29)
COLOR UR: ABNORMAL
CREAT BLD-MCNC: 0.66 MG/DL (ref 0.57–1)
DEPRECATED RDW RBC AUTO: 38.1 FL (ref 37–54)
EOSINOPHIL # BLD AUTO: 0.1 10*3/MM3 (ref 0–0.4)
EOSINOPHIL NFR BLD AUTO: 2.3 % (ref 0.3–6.2)
ERYTHROCYTE [DISTWIDTH] IN BLOOD BY AUTOMATED COUNT: 13.2 % (ref 12.3–15.4)
GFR SERPL CREATININE-BSD FRML MDRD: 90 ML/MIN/1.73
GLUCOSE BLD-MCNC: 125 MG/DL (ref 65–99)
GLUCOSE UR STRIP-MCNC: ABNORMAL MG/DL
HCT VFR BLD AUTO: 37.2 % (ref 34–46.6)
HGB BLD-MCNC: 12.7 G/DL (ref 12–15.9)
HGB UR QL STRIP.AUTO: NEGATIVE
HYALINE CASTS UR QL AUTO: ABNORMAL /LPF
KETONES UR QL STRIP: ABNORMAL
LEUKOCYTE ESTERASE UR QL STRIP.AUTO: ABNORMAL
LYMPHOCYTES # BLD AUTO: 1.9 10*3/MM3 (ref 0.7–3.1)
LYMPHOCYTES NFR BLD AUTO: 34.3 % (ref 19.6–45.3)
MCH RBC QN AUTO: 27.8 PG (ref 26.6–33)
MCHC RBC AUTO-ENTMCNC: 34.2 G/DL (ref 31.5–35.7)
MCV RBC AUTO: 81.5 FL (ref 79–97)
MONOCYTES # BLD AUTO: 0.5 10*3/MM3 (ref 0.1–0.9)
MONOCYTES NFR BLD AUTO: 8.9 % (ref 5–12)
NEUTROPHILS # BLD AUTO: 3 10*3/MM3 (ref 1.7–7)
NEUTROPHILS NFR BLD AUTO: 54 % (ref 42.7–76)
NITRITE UR QL STRIP: POSITIVE
NRBC BLD AUTO-RTO: 0.1 /100 WBC (ref 0–0.2)
PH UR STRIP.AUTO: <=5 [PH] (ref 5–8)
PLATELET # BLD AUTO: 216 10*3/MM3 (ref 140–450)
PMV BLD AUTO: 8 FL (ref 6–12)
POTASSIUM BLD-SCNC: 4 MMOL/L (ref 3.5–5.2)
PROT UR QL STRIP: ABNORMAL
RBC # BLD AUTO: 4.56 10*6/MM3 (ref 3.77–5.28)
RBC # UR: ABNORMAL /HPF
REF LAB TEST METHOD: ABNORMAL
SODIUM BLD-SCNC: 141 MMOL/L (ref 136–145)
SP GR UR STRIP: 1.03 (ref 1–1.03)
SQUAMOUS #/AREA URNS HPF: ABNORMAL /HPF
UROBILINOGEN UR QL STRIP: ABNORMAL
WBC NRBC COR # BLD: 5.5 10*3/MM3 (ref 3.4–10.8)
WBC UR QL AUTO: ABNORMAL /HPF

## 2019-12-23 PROCEDURE — 80048 BASIC METABOLIC PNL TOTAL CA: CPT | Performed by: NURSE PRACTITIONER

## 2019-12-23 PROCEDURE — 74176 CT ABD & PELVIS W/O CONTRAST: CPT

## 2019-12-23 PROCEDURE — 25010000002 HYDROMORPHONE PER 4 MG: Performed by: NURSE PRACTITIONER

## 2019-12-23 PROCEDURE — 87086 URINE CULTURE/COLONY COUNT: CPT | Performed by: NURSE PRACTITIONER

## 2019-12-23 PROCEDURE — 81001 URINALYSIS AUTO W/SCOPE: CPT | Performed by: NURSE PRACTITIONER

## 2019-12-23 PROCEDURE — 25010000002 ONDANSETRON PER 1 MG: Performed by: NURSE PRACTITIONER

## 2019-12-23 PROCEDURE — 25010000002 CEFTRIAXONE PER 250 MG: Performed by: NURSE PRACTITIONER

## 2019-12-23 PROCEDURE — 99283 EMERGENCY DEPT VISIT LOW MDM: CPT

## 2019-12-23 PROCEDURE — 96375 TX/PRO/DX INJ NEW DRUG ADDON: CPT

## 2019-12-23 PROCEDURE — 96374 THER/PROPH/DIAG INJ IV PUSH: CPT

## 2019-12-23 PROCEDURE — 85025 COMPLETE CBC W/AUTO DIFF WBC: CPT | Performed by: NURSE PRACTITIONER

## 2019-12-23 RX ORDER — HYDROMORPHONE HCL 110MG/55ML
0.5 PATIENT CONTROLLED ANALGESIA SYRINGE INTRAVENOUS ONCE
Status: COMPLETED | OUTPATIENT
Start: 2019-12-23 | End: 2019-12-23

## 2019-12-23 RX ORDER — ONDANSETRON 4 MG/1
4 TABLET, FILM COATED ORAL EVERY 8 HOURS PRN
Qty: 12 TABLET | Refills: 0 | Status: SHIPPED | OUTPATIENT
Start: 2019-12-23 | End: 2019-12-27

## 2019-12-23 RX ORDER — SODIUM CHLORIDE 0.9 % (FLUSH) 0.9 %
10 SYRINGE (ML) INJECTION AS NEEDED
Status: DISCONTINUED | OUTPATIENT
Start: 2019-12-23 | End: 2019-12-24 | Stop reason: HOSPADM

## 2019-12-23 RX ORDER — HYDROCODONE BITARTRATE AND ACETAMINOPHEN 5; 325 MG/1; MG/1
1 TABLET ORAL EVERY 6 HOURS PRN
Qty: 12 TABLET | Refills: 0 | Status: SHIPPED | OUTPATIENT
Start: 2019-12-23 | End: 2019-12-23 | Stop reason: ALTCHOICE

## 2019-12-23 RX ORDER — ONDANSETRON 2 MG/ML
4 INJECTION INTRAMUSCULAR; INTRAVENOUS ONCE
Status: COMPLETED | OUTPATIENT
Start: 2019-12-23 | End: 2019-12-23

## 2019-12-23 RX ADMIN — ONDANSETRON 4 MG: 2 INJECTION INTRAMUSCULAR; INTRAVENOUS at 20:34

## 2019-12-23 RX ADMIN — SODIUM CHLORIDE 1000 ML: 900 INJECTION, SOLUTION INTRAVENOUS at 20:34

## 2019-12-23 RX ADMIN — CEFTRIAXONE SODIUM 1 G: 10 INJECTION, POWDER, FOR SOLUTION INTRAVENOUS at 20:41

## 2019-12-23 RX ADMIN — HYDROMORPHONE HYDROCHLORIDE 0.5 MG: 2 INJECTION, SOLUTION INTRAMUSCULAR; INTRAVENOUS; SUBCUTANEOUS at 20:35

## 2019-12-24 NOTE — ED NOTES
PT c/o flank pain reports was DX with UTI/E-coli today, pt c/o of pain not getting any better      Nathaly Banuelos RN  12/23/19 1930

## 2019-12-24 NOTE — DISCHARGE INSTRUCTIONS
Ensure adequate fluid intake, begin oral antibiotics tomorrow that was prescribed for you by Dr. Rendon, continue your tramadol for control of pain; use Zofran as needed for nausea.    Opiate medications can cause drowsiness, no swimming or bathing alone operation of motor vehicles alcohol or other sedating medication should be taken or utilized while taking opiate medications; shortly opiate medications can increase the risk of addiction, please use only for moderate to severe pain and consider other alternative pain medications treatments    Per Medicaid and Medicare standards, your blood pressure was noted to be elevated, please follow-up with your primary care physician for continued evaluation; untreated hypertension can increase your risk for heart disease, kidney failure and other end-organ damage.  Return to the ER for any worsening symptoms including development of chest pain shortness of breath, sudden headache, slurred speech, unilateral motor sensory weakness or loss    To the ER for worsening symptoms including increase or change in pain, development of vomiting, inability to tolerate p.o. food or fluids or other worsening symptoms

## 2019-12-24 NOTE — ED NOTES
Pt resting in bed, not new complaints voiced not distress notice will continue to monitor      Nathaly Banuelos RN  12/23/19 [id number]

## 2019-12-24 NOTE — ED PROVIDER NOTES
Subjective   Context:   64-year-old female patient presents the ER with complaint of burning with urination, discolored urine; patient reports onset of urinary tract symptoms of began 9 days ago, she reports that her left flank pain is progressively worsened with worsening symptoms noted today.  She reports no nausea or vomiting, no fever or chills.  Patient states she was contacted by her urologist and instructed that she had a UTI with E. coli involvement; reports that she started Augmentin today.  The patient reports increased urgency, frequency, she reports poor tolerance of pain medications prescribed by MD.      Location: L flank  Quality: pressure  Timin days  Duration: progressively worsening  Aggravating:urinating  Alleviating:no relief with pyridium, tramadol    Urology:  Justice          Review of Systems   Constitutional: Negative for chills, fatigue and fever.   HENT: Negative for congestion, ear discharge, ear pain, mouth sores, sore throat, trouble swallowing and voice change.    Eyes: Negative for photophobia, pain, discharge and visual disturbance.   Respiratory: Negative for cough, chest tightness and shortness of breath.    Cardiovascular: Negative for chest pain, palpitations and leg swelling.   Gastrointestinal: Negative for abdominal pain, diarrhea, nausea and vomiting.   Genitourinary: Positive for dysuria, flank pain, frequency, hematuria and urgency. Negative for decreased urine volume, difficulty urinating, vaginal bleeding and vaginal discharge.   Musculoskeletal: Negative for arthralgias, back pain, myalgias, neck pain and neck stiffness.   Skin: Negative for color change, pallor, rash and wound.   Neurological: Negative for dizziness, weakness, light-headedness, numbness and headaches.   Hematological: Negative for adenopathy. Does not bruise/bleed easily.     Prior to Admission medications    Medication Sig Start Date End Date Taking? Authorizing Provider   atorvastatin (LIPITOR) 40 MG  tablet  6/15/19   Chito Henao MD   DULoxetine (CYMBALTA) 60 MG capsule Take 1 capsule by mouth Daily. 10/1/19   Nilda Deleon MD   ESTRACE VAGINAL 0.1 MG/GM vaginal cream  8/5/19   Chito Henao MD   glucose blood (FREESTYLE LITE) test strip TEST twice a day 1/20/17   Chito Henao MD   glucose blood (ONE TOUCH ULTRA TEST) test strip ONETOUCH ULTRA BLUE STRP 8/2/18   Chito Henao MD   hydrocortisone 2.5 % cream HYDROCORTISONE 2.5 % CREA 7/17/18   Chito Henao MD   metFORMIN ER (GLUCOPHAGE-XR) 500 MG 24 hr tablet Take 1 tablet by mouth 2 (Two) Times a Day. 11/8/19   Ofelia Delgado APRN   Multiple Vitamin (MULTI-DAY VITAMINS) tablet MULTI-DAY VITAMINS TABS 12/22/13   Chito Henao MD   nitrofurantoin, macrocrystal-monohydrate, (MACROBID) 100 MG capsule Take 1 capsule by mouth 2 (Two) Times a Day. 10/11/19   Yumiko Howard APRN   ONETOUCH DELICA LANCETS 33G misc ONETOUCH DELICA LANCETS 33G 8/2/18   Chito Henao MD   phenazopyridine (PYRIDIUM) 100 MG tablet Take 100 mg by mouth 3 (Three) Times a Day As Needed. 4/17/19   Chito Henao MD   traMADol (ULTRAM) 50 MG tablet  6/10/19   Chito Henao MD   triamcinolone (KENALOG) 0.1 % paste Apply 1 application to the mouth or throat. 1/25/17   Chito Henao MD       Past Medical History:   Diagnosis Date   • Anxiety    • Depression    • Hyperlipidemia    • Lichen planus     MOUTH - CAUSES SORES, WEBBING AND INFLAMATION   • Tachycardia        Allergies   Allergen Reactions   • Iodine Shortness Of Breath   • Prednisone Dizziness   • Sulfa Antibiotics Rash       Past Surgical History:   Procedure Laterality Date   • COLON SURGERY  2003    PART OF COLON/REMOVED   • TOTAL ABDOMINAL HYSTERECTOMY  1987       Family History   Problem Relation Age of Onset   • Cancer Other        Social History     Socioeconomic History   • Marital status:      Spouse name: Not on file   • Number of children:  Not on file   • Years of education: Not on file   • Highest education level: Not on file   Tobacco Use   • Smoking status: Never Smoker   Substance and Sexual Activity   • Alcohol use: No     Frequency: Never   • Drug use: No           Objective   Physical Exam       Vital signs and triage nurse note reviewed.   Constitutional: Awake, alert; well-developed and well-nourished. No acute distress is noted.   HEENT: Normocephalic, atraumatic; pupils are PERRL with intact EOM; oropharynx is pink and moist without exudate or erythema.   Neck: Supple, full range of motion without pain;    Cardiovascular: Regular rate and rhythm, normal S1-S2.   Pulmonary: Respiratory effort regular nonlabored, breath sounds clear to auscultation all fields.   Abdomen: Soft, this with palpation to the left flank, no organomegaly or pulsatile mass, abdomen is nondistended with normoactive bowel sounds; no rebound or guarding.   Musculoskeletal: Independent range of motion of all extremities with no palpable tenderness or edema.   Neuro: Alert oriented x3, speech is clear and appropriate, GCS 15   Skin:  Fleshtone warm, dry, intact; no erythematous or petechial rash or lesion  Procedures           ED Course      Ct Abdomen Pelvis Without Contrast    Result Date: 12/23/2019  1. Fluid in small bowel-question mild enteritis. 2. Negative for urinary tract stone disease. 3. Appendix is not delineated. There are no secondary signs of appendicitis. 4. There is a cystocele of the bladder base. There is small gas in the bladder-Gas in the bladder is often from catheterization and clinical correlation is recommended 5. Moderate stool.  Electronically Signed By-Sarah Luu On:12/23/2019 10:25 PM This report was finalized on [id number] by  Sarah Luu, .    Results for orders placed or performed during the hospital encounter of 12/23/19   Basic Metabolic Panel   Result Value Ref Range    Glucose 125 (H) 65 - 99 mg/dL    BUN 12 8 - 23 mg/dL     Creatinine 0.66 0.57 - 1.00 mg/dL    Sodium 141 136 - 145 mmol/L    Potassium 4.0 3.5 - 5.2 mmol/L    Chloride 103 98 - 107 mmol/L    CO2 28.0 22.0 - 29.0 mmol/L    Calcium 9.4 8.6 - 10.5 mg/dL    eGFR Non African Amer 90 >60 mL/min/1.73    BUN/Creatinine Ratio 18.2 7.0 - 25.0    Anion Gap 10.0 5.0 - 15.0 mmol/L   Urinalysis With Culture If Indicated - Urine, Clean Catch   Result Value Ref Range    Color, UA Orange (A) Yellow, Straw    Appearance, UA Clear Clear    pH, UA <=5.0 5.0 - 8.0    Specific Gravity, UA 1.027 1.005 - 1.030    Glucose,  mg/dL (1+) (A) Negative    Ketones, UA 15 mg/dL (1+) (A) Negative    Bilirubin, UA Small (1+) (A) Negative    Blood, UA Negative Negative    Protein, UA Trace (A) Negative    Leuk Esterase, UA Moderate (2+) (A) Negative    Nitrite, UA Positive (A) Negative    Urobilinogen, UA 2.0 E.U./dL (A) 0.2 - 1.0 E.U./dL   CBC Auto Differential   Result Value Ref Range    WBC 5.50 3.40 - 10.80 10*3/mm3    RBC 4.56 3.77 - 5.28 10*6/mm3    Hemoglobin 12.7 12.0 - 15.9 g/dL    Hematocrit 37.2 34.0 - 46.6 %    MCV 81.5 79.0 - 97.0 fL    MCH 27.8 26.6 - 33.0 pg    MCHC 34.2 31.5 - 35.7 g/dL    RDW 13.2 12.3 - 15.4 %    RDW-SD 38.1 37.0 - 54.0 fl    MPV 8.0 6.0 - 12.0 fL    Platelets 216 140 - 450 10*3/mm3    Neutrophil % 54.0 42.7 - 76.0 %    Lymphocyte % 34.3 19.6 - 45.3 %    Monocyte % 8.9 5.0 - 12.0 %    Eosinophil % 2.3 0.3 - 6.2 %    Basophil % 0.5 0.0 - 1.5 %    Neutrophils, Absolute 3.00 1.70 - 7.00 10*3/mm3    Lymphocytes, Absolute 1.90 0.70 - 3.10 10*3/mm3    Monocytes, Absolute 0.50 0.10 - 0.90 10*3/mm3    Eosinophils, Absolute 0.10 0.00 - 0.40 10*3/mm3    Basophils, Absolute 0.00 0.00 - 0.20 10*3/mm3    nRBC 0.1 0.0 - 0.2 /100 WBC   Urinalysis, Microscopic Only - Urine, Clean Catch   Result Value Ref Range    RBC, UA 3-5 (A) None Seen /HPF    WBC, UA 6-12 (A) None Seen /HPF    Bacteria, UA None Seen None Seen /HPF    Squamous Epithelial Cells, UA 0-2 None Seen, 0-2 /HPF     "Hyaline Casts, UA None Seen None Seen /LPF    Methodology Manual Light Microscopy      Medications   sodium chloride 0.9 % flush 10 mL (has no administration in time range)   sodium chloride 0.9 % bolus 1,000 mL (1,000 mL Intravenous New Bag 12/23/19 2034)   HYDROmorphone (DILAUDID) injection 0.5 mg (0.5 mg Intravenous Given 12/23/19 2035)   ondansetron (ZOFRAN) injection 4 mg (4 mg Intravenous Given 12/23/19 2034)   cefTRIAXone (ROCEPHIN) in SWFI 1 gram/10ml IV PUSH syringe (1 g Intravenous Given 12/23/19 2041)     /59   Pulse 86   Temp 97.4 °F (36.3 °C) (Oral)   Resp 18   Ht 161.3 cm (63.5\")   Wt 61.6 kg (135 lb 12.9 oz)   SpO2 97%   BMI 23.68 kg/m²                   No data recorded                        MDM  Number of Diagnoses or Management Options     Amount and/or Complexity of Data Reviewed  Clinical lab tests: ordered and reviewed  Tests in the radiology section of CPT®: ordered and reviewed  Review and summarize past medical records: yes (Urine culture July 2019    Urine Culture   >100,000 CFU/mL Escherichia coliAbnormal        Resulting Agency: [facility]  Susceptibility        Escherichia coli    ADWOA    Ampicillin >=32 ug/ml Resistant    Ampicillin + Sulbactam 16 ug/ml Intermediate    Cefazolin <=4 ug/ml Susceptible    Cefepime <=1 ug/ml Susceptible    Ceftazidime <=1 ug/ml Susceptible    Ceftriaxone <=1 ug/ml Susceptible    Gentamicin <=1 ug/ml Susceptible    Levofloxacin <=0.12 ug/ml Susceptible    Nitrofurantoin <=16 ug/ml Susceptible    Piperacillin + Tazobactam <=4 ug/ml Susceptible    Tetracycline >=16 ug/ml Resistant    Trimethoprim + Sulfamethoxazole >=320 ug/ml Resistant       )    Risk of Complications, Morbidity, and/or Mortality  General comments: Comorbidities:  Past medical history, allergies, current medications family history social history noted above    Review and summarization of lab specimens, radiology:  ED tests reviewed by me    Differentials: UTI, pyelonephritis, " sepsis, electrolyte balance, dehydration, acute surgical abdomen; this list is not all inclusive and does not constitute the entirety of considered causes              Blood cultures reviewed, patient reports that she was instructed by urology that her urine was infected with E. coli; ceftriaxone will be given.      Reviewed at this time the patient reports improvement; initially the patient reports that tramadol was not appropriate, the patient was going to be provided with hydrocodone, however it was noted later that the patient was in pain management; discussion held with the patient, she is aware that she needs to continue your tramadol and contact pain management for alteration to pain management therapy.  Findings reviewed with recommendations made for home treatment with antibiotics prescribed by Dr. Rendon, I reviewed signs symptoms require immediate return for reevaluation patient is discharged proved stable condition ambulatory with an upright steady gait.      Final diagnoses:   Flank pain   Urinary tract infection with hematuria, site unspecified              Mary Jo Miller, DANIA  12/23/19 [id number]

## 2019-12-25 LAB — BACTERIA SPEC AEROBE CULT: NO GROWTH

## 2020-01-03 ENCOUNTER — TELEPHONE (OUTPATIENT)
Dept: FAMILY MEDICINE CLINIC | Facility: CLINIC | Age: 65
End: 2020-01-03

## 2020-01-03 RX ORDER — DULOXETIN HYDROCHLORIDE 60 MG/1
60 CAPSULE, DELAYED RELEASE ORAL DAILY
Qty: 90 CAPSULE | Refills: 0 | Status: SHIPPED | OUTPATIENT
Start: 2020-01-03 | End: 2020-04-07

## 2020-01-08 ENCOUNTER — OFFICE VISIT (OUTPATIENT)
Dept: FAMILY MEDICINE CLINIC | Facility: CLINIC | Age: 65
End: 2020-01-08

## 2020-01-08 VITALS
HEIGHT: 64 IN | BODY MASS INDEX: 23.05 KG/M2 | SYSTOLIC BLOOD PRESSURE: 127 MMHG | WEIGHT: 135 LBS | DIASTOLIC BLOOD PRESSURE: 86 MMHG | OXYGEN SATURATION: 97 % | HEART RATE: 95 BPM

## 2020-01-08 DIAGNOSIS — N39.0 RECURRENT UTI: ICD-10-CM

## 2020-01-08 DIAGNOSIS — Z12.31 BREAST CANCER SCREENING BY MAMMOGRAM: ICD-10-CM

## 2020-01-08 DIAGNOSIS — L43.9 LICHEN PLANUS: ICD-10-CM

## 2020-01-08 DIAGNOSIS — E03.9 ACQUIRED HYPOTHYROIDISM: ICD-10-CM

## 2020-01-08 DIAGNOSIS — E11.9 TYPE 2 DIABETES MELLITUS WITHOUT COMPLICATION, WITHOUT LONG-TERM CURRENT USE OF INSULIN (HCC): ICD-10-CM

## 2020-01-08 DIAGNOSIS — Z78.0 POSTMENOPAUSAL: ICD-10-CM

## 2020-01-08 DIAGNOSIS — M79.7 FIBROMYOSITIS: Primary | ICD-10-CM

## 2020-01-08 DIAGNOSIS — E78.2 MIXED HYPERLIPIDEMIA: ICD-10-CM

## 2020-01-08 PROCEDURE — 99214 OFFICE O/P EST MOD 30 MIN: CPT | Performed by: NURSE PRACTITIONER

## 2020-01-08 RX ORDER — PREGABALIN 50 MG/1
50 CAPSULE ORAL 3 TIMES DAILY
COMMUNITY
End: 2020-01-08 | Stop reason: SDUPTHER

## 2020-01-08 RX ORDER — NITROFURANTOIN MACROCRYSTALS 100 MG/1
CAPSULE ORAL
Refills: 0 | Status: ON HOLD | COMMUNITY
Start: 2019-12-06 | End: 2022-03-29

## 2020-01-08 RX ORDER — AMOXICILLIN AND CLAVULANATE POTASSIUM 500; 125 MG/1; MG/1
TABLET, FILM COATED ORAL
COMMUNITY
Start: 2019-12-23 | End: 2020-07-01

## 2020-01-08 RX ORDER — PREGABALIN 50 MG/1
50 CAPSULE ORAL 3 TIMES DAILY
Qty: 90 CAPSULE | Refills: 2 | Status: SHIPPED | OUTPATIENT
Start: 2020-01-08 | End: 2022-07-27

## 2020-01-08 NOTE — PROGRESS NOTES
"  Emilee Gandara is a 64 y.o. female.     Chief Complaint   Patient presents with   • Pain     Would like to continue lyrica.  Might need a mammo but she isn't sure.    • Diabetes       History of Present Illness     1. Recent ED visit for UTI, had been on long term macrobid, now on daily augmentin. Saw Dr. Rendon in past, has bladder prolapse and reports she has to stand to start urinating, feels done, but has to immediately go back and urinates more.  Does not have a follow-up scheduled with Dr. Rendon.     2. Fibromyalgia/neuropathy diagnosed several years ago, as chronic pain and many sites of pain on a daily basis, she is on duloxetine and was started on Lyrica which were both effective for chronic pain. She owns a business, is always on the go, ran out of lyrica and pain is flaring back up again.  She was referred to rheumatology but has not heard anything about the referral, she is going to pain management and is prescribed tramadol that she takes occasionally    3. Lichen planus of the mouth, referred to ENT but hasn't heard anything about the referral.  She thinks the sores in her mouth are stable at this time. wants to review labs from October.     4. DMII, takes metformin twice daily denies symptoms of hypo-/hyperglycemia    Subjective     Visit Vitals  /86 (BP Location: Left arm, Patient Position: Sitting, Cuff Size: Adult)   Pulse 95   Ht 161.3 cm (63.5\")   Wt 61.2 kg (135 lb)   SpO2 97%   BMI 23.54 kg/m²       The following portions of the patient's history were reviewed and updated as appropriate: allergies, current medications, past family history, past medical history, past social history, past surgical history and problem list.    Review of Systems   Constitutional: Negative for chills, fatigue and fever.   HENT: Negative for dental problem, ear pain, sinus pressure and sore throat.    Eyes: Negative for visual disturbance.   Respiratory: Negative for cough, shortness of breath and wheezing.  "   Gastrointestinal: Negative for abdominal pain, blood in stool, constipation, diarrhea, nausea, vomiting and GERD.   Genitourinary: Positive for difficulty urinating, dysuria and urgency. Negative for frequency and urinary incontinence.   Musculoskeletal: Positive for arthralgias, back pain and myalgias. Negative for gait problem, joint swelling and neck pain.   Skin: Negative for dry skin, pallor and rash.   Neurological: Negative for dizziness, seizures, speech difficulty and weakness.   Hematological: Negative for adenopathy.   Psychiatric/Behavioral: Positive for depressed mood. Negative for sleep disturbance and stress. The patient is nervous/anxious.        Objective     Physical Exam   Constitutional: She is oriented to person, place, and time. She appears well-developed and well-nourished. No distress.   HENT:   Head: Normocephalic.   Eyes: Pupils are equal, round, and reactive to light. Conjunctivae are normal.   Neck: Normal range of motion. Neck supple. No JVD present. No thyromegaly present.   Cardiovascular: Normal rate, regular rhythm and normal heart sounds.   No murmur heard.  Pulmonary/Chest: Effort normal and breath sounds normal.   Abdominal: Soft. Bowel sounds are normal. She exhibits no distension. There is no tenderness.   Musculoskeletal: Normal range of motion. She exhibits tenderness ( >12 sites of pain on daily basis). She exhibits no edema.   Neurological: She is alert and oriented to person, place, and time. A sensory deficit (decreased sensation ) is present.   Skin: Skin is warm and dry. No rash noted. She is not diaphoretic. No erythema.   Psychiatric: Her behavior is normal. Judgment normal. Her mood appears anxious. She exhibits a depressed mood.   Nursing note and vitals reviewed.        Assessment/Plan   Emilee was seen today for pain and diabetes.    Diagnoses and all orders for this visit:    Fibromyositis  -     pregabalin (LYRICA) 50 MG capsule; Take 1 capsule by mouth 3  (Three) Times a Day.  Continue duloxetine, refill Lyrica, continue follow-ups with pain management.  Will check on referral to rheumatology to rule out other autoimmune/rheumatoid disorders.  Reviewed previous CRP, sed rate JASMIN , and RA factor normal     Acquired hypothyroidism  -     TSH  Abnormal at 1 point, patient is not on medication check TSH in the morning    Recurrent UTI  Continue Augmentin daily for now, recommend patient to make a follow-up with Dr. Rendon    Breast cancer screening by mammogram  -     Mammo Screening Digital Tomosynthesis Bilateral With CAD    Type 2 diabetes mellitus without complication, without long-term current use of insulin (CMS/[facility])  -     CBC (No Diff)  -     Hemoglobin A1c  -     Comprehensive Metabolic Panel  Patient will return tomorrow morning fasting for blood work, continue metformin twice daily we will need refill once labs have been reviewed    Mixed hyperlipidemia  -     Lipid Panel  -     TSH  Continue atorvastatin, check labs in the morning will notify patient results    Lichen planus  Oral, stable at this time, patient did not receive referral to ENT but declines the referral at this time.  Will re-eval in the future if needed    Postmenopausal  -     DEXA Bone Density Axial; Future    Likely return in 6 months for next visit and medication refills, will evaluate labs tomorrow and notify patient         Glucose   Date Value Ref Range Status   12/23/2019 125 (H) 65 - 99 mg/dL Final     BUN   Date Value Ref Range Status   12/23/2019 12 8 - 23 mg/dL Final     Creatinine   Date Value Ref Range Status   12/23/2019 0.66 0.57 - 1.00 mg/dL Final     Sodium   Date Value Ref Range Status   12/23/2019 141 136 - 145 mmol/L Final     Potassium   Date Value Ref Range Status   12/23/2019 4.0 3.5 - 5.2 mmol/L Final     Chloride   Date Value Ref Range Status   12/23/2019 103 98 - 107 mmol/L Final     CO2   Date Value Ref Range Status   12/23/2019 28.0 22.0 - 29.0 mmol/L Final      Calcium   Date Value Ref Range Status   12/23/2019 9.4 8.6 - 10.5 mg/dL Final     Total Protein   Date Value Ref Range Status   07/24/2019 7.1 6.1 - 7.9 g/dL Final     Albumin   Date Value Ref Range Status   07/24/2019 4.30 3.50 - 4.80 g/dL Final     ALT (SGPT)   Date Value Ref Range Status   07/24/2019 24 14 - 54 U/L Final     AST (SGOT)   Date Value Ref Range Status   07/24/2019 21 15 - 41 U/L Final     Alkaline Phosphatase   Date Value Ref Range Status   07/24/2019 73 32 - 91 U/L Final     Total Bilirubin   Date Value Ref Range Status   07/24/2019 1.6 (H) 0.3 - 1.2 mg/dL Final     eGFR Non  Amer   Date Value Ref Range Status   12/23/2019 90 >60 mL/min/1.73 Final     A/G Ratio   Date Value Ref Range Status   01/24/2019 1.8 (H) 1.0 - 1.7 Final     BUN/Creatinine Ratio   Date Value Ref Range Status   12/23/2019 18.2 7.0 - 25.0 Final     Anion Gap   Date Value Ref Range Status   12/23/2019 10.0 5.0 - 15.0 mmol/L Final

## 2020-01-09 ENCOUNTER — TELEPHONE (OUTPATIENT)
Dept: FAMILY MEDICINE CLINIC | Facility: CLINIC | Age: 65
End: 2020-01-09

## 2020-01-09 PROCEDURE — 85027 COMPLETE CBC AUTOMATED: CPT | Performed by: NURSE PRACTITIONER

## 2020-01-09 PROCEDURE — 80061 LIPID PANEL: CPT | Performed by: NURSE PRACTITIONER

## 2020-01-09 PROCEDURE — 84443 ASSAY THYROID STIM HORMONE: CPT | Performed by: NURSE PRACTITIONER

## 2020-01-09 PROCEDURE — 80053 COMPREHEN METABOLIC PANEL: CPT | Performed by: NURSE PRACTITIONER

## 2020-01-09 PROCEDURE — 83036 HEMOGLOBIN GLYCOSYLATED A1C: CPT | Performed by: NURSE PRACTITIONER

## 2020-01-09 NOTE — TELEPHONE ENCOUNTER
Patient requested status on PA for lyrica.  If there hasn't been one started, would you please initiate a PA?  Thank you.

## 2020-01-10 ENCOUNTER — DOCUMENTATION (OUTPATIENT)
Dept: FAMILY MEDICINE CLINIC | Facility: CLINIC | Age: 65
End: 2020-01-10

## 2020-01-10 LAB
ALBUMIN SERPL-MCNC: 4.3 G/DL (ref 3.5–5.2)
ALBUMIN/GLOB SERPL: 1.7 G/DL
ALP SERPL-CCNC: 69 U/L (ref 39–117)
ALT SERPL W P-5'-P-CCNC: 11 U/L (ref 1–33)
ANION GAP SERPL CALCULATED.3IONS-SCNC: 14.2 MMOL/L (ref 5–15)
AST SERPL-CCNC: 15 U/L (ref 1–32)
BILIRUB SERPL-MCNC: 0.6 MG/DL (ref 0.2–1.2)
BUN BLD-MCNC: 16 MG/DL (ref 8–23)
BUN/CREAT SERPL: 25.8 (ref 7–25)
CALCIUM SPEC-SCNC: 9.5 MG/DL (ref 8.6–10.5)
CHLORIDE SERPL-SCNC: 100 MMOL/L (ref 98–107)
CHOLEST SERPL-MCNC: 146 MG/DL (ref 0–200)
CO2 SERPL-SCNC: 24.8 MMOL/L (ref 22–29)
CREAT BLD-MCNC: 0.62 MG/DL (ref 0.57–1)
DEPRECATED RDW RBC AUTO: 36.6 FL (ref 37–54)
ERYTHROCYTE [DISTWIDTH] IN BLOOD BY AUTOMATED COUNT: 12.4 % (ref 12.3–15.4)
GFR SERPL CREATININE-BSD FRML MDRD: 96 ML/MIN/1.73
GLOBULIN UR ELPH-MCNC: 2.6 GM/DL
GLUCOSE BLD-MCNC: 97 MG/DL (ref 65–99)
HBA1C MFR BLD: 6.1 % (ref 3.5–5.6)
HCT VFR BLD AUTO: 38.7 % (ref 34–46.6)
HDLC SERPL-MCNC: 54 MG/DL (ref 40–60)
HGB BLD-MCNC: 12.6 G/DL (ref 12–15.9)
LDLC SERPL CALC-MCNC: 82 MG/DL (ref 0–100)
LDLC/HDLC SERPL: 1.52 {RATIO}
MCH RBC QN AUTO: 26.7 PG (ref 26.6–33)
MCHC RBC AUTO-ENTMCNC: 32.6 G/DL (ref 31.5–35.7)
MCV RBC AUTO: 82 FL (ref 79–97)
PLATELET # BLD AUTO: 208 10*3/MM3 (ref 140–450)
PMV BLD AUTO: 11.5 FL (ref 6–12)
POTASSIUM BLD-SCNC: 4.5 MMOL/L (ref 3.5–5.2)
PROT SERPL-MCNC: 6.9 G/DL (ref 6–8.5)
RBC # BLD AUTO: 4.72 10*6/MM3 (ref 3.77–5.28)
SODIUM BLD-SCNC: 139 MMOL/L (ref 136–145)
TRIGL SERPL-MCNC: 49 MG/DL (ref 0–150)
TSH SERPL DL<=0.05 MIU/L-ACNC: 0.88 UIU/ML (ref 0.27–4.2)
VLDLC SERPL-MCNC: 9.8 MG/DL (ref 5–40)
WBC NRBC COR # BLD: 5.65 10*3/MM3 (ref 3.4–10.8)

## 2020-01-10 RX ORDER — METFORMIN HYDROCHLORIDE 500 MG/1
500 TABLET, EXTENDED RELEASE ORAL 2 TIMES DAILY
Qty: 180 TABLET | Refills: 1 | Status: SHIPPED | OUTPATIENT
Start: 2020-01-10 | End: 2020-07-01 | Stop reason: SDUPTHER

## 2020-01-10 RX ORDER — ATORVASTATIN CALCIUM 40 MG/1
40 TABLET, FILM COATED ORAL NIGHTLY
Qty: 90 TABLET | Refills: 1 | Status: SHIPPED | OUTPATIENT
Start: 2020-01-10 | End: 2020-04-20 | Stop reason: SDUPTHER

## 2020-02-20 ENCOUNTER — HOSPITAL ENCOUNTER (OUTPATIENT)
Dept: MAMMOGRAPHY | Facility: HOSPITAL | Age: 65
Discharge: HOME | End: 2020-02-20
Admitting: NURSE PRACTITIONER

## 2020-02-20 ENCOUNTER — HOSPITAL ENCOUNTER (OUTPATIENT)
Dept: BONE DENSITY | Facility: HOSPITAL | Age: 65
Discharge: HOME | End: 2020-02-20

## 2020-02-20 DIAGNOSIS — Z78.0 POSTMENOPAUSAL: ICD-10-CM

## 2020-02-20 PROCEDURE — 77063 BREAST TOMOSYNTHESIS BI: CPT

## 2020-02-20 PROCEDURE — 77067 SCR MAMMO BI INCL CAD: CPT

## 2020-02-20 PROCEDURE — 77080 DXA BONE DENSITY AXIAL: CPT

## 2020-02-21 ENCOUNTER — TELEPHONE (OUTPATIENT)
Dept: FAMILY MEDICINE CLINIC | Facility: CLINIC | Age: 65
End: 2020-02-21

## 2020-02-21 RX ORDER — METHYLPREDNISOLONE 4 MG/1
TABLET ORAL
Qty: 21 TABLET | Refills: 0 | Status: SHIPPED | OUTPATIENT
Start: 2020-02-21 | End: 2020-07-01

## 2020-02-21 NOTE — TELEPHONE ENCOUNTER
Patient currently goes to [facility] for fpr her back- buldging disks etc[facility] [place]. She now has nerve issues in her back  - it feels like firecrackers going off. Its been happening for 4 days. She kept thinking it would stop but it has not. She called [facility] to get an appt as she is a current pt. They told her that because tis is a new issue- she would have to get a referral first. Can you do a referral for her?

## 2020-02-25 ENCOUNTER — OFFICE VISIT (OUTPATIENT)
Dept: FAMILY MEDICINE CLINIC | Facility: CLINIC | Age: 65
End: 2020-02-25

## 2020-02-25 VITALS
HEART RATE: 93 BPM | SYSTOLIC BLOOD PRESSURE: 131 MMHG | OXYGEN SATURATION: 98 % | BODY MASS INDEX: 21.94 KG/M2 | DIASTOLIC BLOOD PRESSURE: 84 MMHG | WEIGHT: 128.5 LBS | HEIGHT: 64 IN

## 2020-02-25 DIAGNOSIS — F06.4 ANXIETY DISORDER DUE TO KNOWN PHYSIOLOGICAL CONDITION: ICD-10-CM

## 2020-02-25 DIAGNOSIS — M72.2 PLANTAR FASCIITIS OF RIGHT FOOT: ICD-10-CM

## 2020-02-25 DIAGNOSIS — G56.02 CARPAL TUNNEL SYNDROME ON LEFT: ICD-10-CM

## 2020-02-25 DIAGNOSIS — M79.18 MYOFASCIAL PAIN SYNDROME OF THORACIC SPINE: Primary | ICD-10-CM

## 2020-02-25 DIAGNOSIS — L43.9 LICHEN PLANUS: ICD-10-CM

## 2020-02-25 DIAGNOSIS — R53.83 OTHER FATIGUE: ICD-10-CM

## 2020-02-25 PROCEDURE — 99214 OFFICE O/P EST MOD 30 MIN: CPT | Performed by: NURSE PRACTITIONER

## 2020-02-25 RX ORDER — TRAMADOL HYDROCHLORIDE 100 MG/1
TABLET, EXTENDED RELEASE ORAL
Status: ON HOLD | COMMUNITY
Start: 2020-01-23 | End: 2022-03-29 | Stop reason: ALTCHOICE

## 2020-02-25 NOTE — PATIENT INSTRUCTIONS
Plantar Fasciitis Rehab  Ask your health care provider which exercises are safe for you. Do exercises exactly as told by your health care provider and adjust them as directed. It is normal to feel mild stretching, pulling, tightness, or discomfort as you do these exercises. Stop right away if you feel sudden pain or your pain gets worse. Do not begin these exercises until told by your health care provider.  Stretching and range-of-motion exercises  These exercises warm up your muscles and joints and improve the movement and flexibility of your foot. These exercises also help to relieve pain.  Plantar fascia stretch    1. Sit with your left / right leg crossed over your opposite knee.  2. Hold your heel with one hand with that thumb near your arch. With your other hand, hold your toes and gently pull them back toward the top of your foot. You should feel a stretch on the bottom of your toes or your foot (plantar fascia) or both.  3. Hold this stretch for__________ seconds.  4. Slowly release your toes and return to the starting position.  Repeat __________ times. Complete this exercise __________ times a day.  Gastrocnemius stretch, standing  This exercise is also called a calf (gastroc) stretch. It stretches the muscles in the back of the upper calf.  1. Stand with your hands against a wall.  2. Extend your left / right leg behind you, and bend your front knee slightly.  3. Keeping your heels on the floor and your back knee straight, shift your weight toward the wall. Do not arch your back. You should feel a gentle stretch in your upper left / right calf.  4. Hold this position for __________ seconds.  Repeat __________ times. Complete this exercise __________ times a day.  Soleus stretch, standing  This exercise is also called a calf (soleus) stretch. It stretches the muscles in the back of the lower calf.  1. Stand with your hands against a wall.  2. Extend your left / right leg behind you, and bend your front  knee slightly.  3. Keeping your heels on the floor, bend your back knee and shift your weight slightly over your back leg. You should feel a gentle stretch deep in your lower calf.  4. Hold this position for __________ seconds.  Repeat __________ times. Complete this exercise __________ times a day.  Gastroc and soleus stretch, standing step  This exercise stretches the muscles in the back of the lower leg. These muscles are in the upper calf (gastrocnemius) and the lower calf (soleus).  1. Stand with the ball of your left / right foot on a step. The ball of your foot is on the walking surface, right under your toes.  2. Keep your other foot firmly on the same step.  3. Hold on to the wall or a railing for balance.  4. Slowly lift your other foot, allowing your body weight to press your left / right heel down over the edge of the step. You should feel a stretch in your left / right calf.  5. Hold this position for __________ seconds.  6. Return both feet to the step.  7. Repeat this exercise with a slight bend in your left / right knee.  Repeat __________ times with your left / right knee straight and __________ times with your left / right knee bent. Complete this exercise __________ times a day.  Balance exercise  This exercise builds your balance and strength control of your arch to help take pressure off your plantar fascia.  Single leg stand  If this exercise is too easy, you can try it with your eyes closed or while standing on a pillow.  1. Without shoes, stand near a railing or in a doorway. You may hold on to the railing or door frame as needed.  2. Stand on your left / right foot. Keep your big toe down on the floor and try to keep your arch lifted. Do not let your foot roll inward.  3. Hold this position for __________ seconds.  Repeat __________ times. Complete this exercise __________ times a day.  This information is not intended to replace advice given to you by your health care provider. Make sure  you discuss any questions you have with your health care provider.  Document Released: 12/18/2006 Document Revised: 10/16/2019 Document Reviewed: 10/16/2019  [employer] Interactive Patient Education © 2020 [employer]

## 2020-02-25 NOTE — PROGRESS NOTES
"  Emilee Gandara is a 64 y.o. female.     Chief Complaint   Patient presents with   • Back Pain       Back Pain   This is a chronic problem. The current episode started more than 1 year ago. The problem occurs 2 to 4 times per day. The problem is unchanged. The pain is present in the thoracic spine. The quality of the pain is described as burning. The pain does not radiate. The pain is at a severity of 7/10. The pain is the same all the time. Stiffness is present all day. Associated symptoms include numbness, tingling, weakness and weight loss. Pertinent negatives include no abdominal pain, bladder incontinence, bowel incontinence, chest pain, dysuria, fever, headaches, leg pain, paresis, paresthesias, pelvic pain or perianal numbness. Risk factors include history of osteoporosis and history of steroid use. She has tried heat and ice for the symptoms. The treatment provided no relief.   Patient goes to [facility] pain management, they are recommending a new referral for the patient's new thoracic pain.    R sole of foot pain, worse in am and if stops walking, sits for period of time the pain starts up again.    Carpal tunnel surgery of the right wrist in the past, now experiencing symptoms of the L hand, wearing brace helps but lately is experiencing burning and tingling of the left hand even when wearing the brace.  Is requesting a referral to hand surgeon    Started lyrica and developed rash, stopped taking    Chronic fatigue, excessive stress, ill family members.  Reviewed labs recently with patient and essentially within normal limits     She also has a history of lichen planus since childhood, she reports it is oral has been referred to ENT but did not attend that appointment as her symptoms somewhat improved, today she is requesting another referral to ENT      Subjective     Visit Vitals  /84 (BP Location: Left arm, Patient Position: Sitting, Cuff Size: Adult)   Pulse 93   Ht 161.3 cm (63.5\")   Wt " 58.3 kg (128 lb 8 oz)   SpO2 98%   BMI 22.40 kg/m²       The following portions of the patient's history were reviewed and updated as appropriate: allergies, current medications, past family history, past medical history, past social history, past surgical history and problem list.    Review of Systems   Constitutional: Positive for unexpected weight loss. Negative for fever.   Respiratory: Negative.    Cardiovascular: Negative for chest pain, palpitations and leg swelling.   Gastrointestinal: Negative for abdominal pain, bowel incontinence, nausea and indigestion.   Endocrine: Negative.    Genitourinary: Negative for dysuria, pelvic pain and urinary incontinence.   Musculoskeletal: Positive for back pain.   Neurological: Positive for tingling, weakness and numbness. Negative for paresthesias.   Hematological: Negative for adenopathy. Does not bruise/bleed easily.   Psychiatric/Behavioral: Positive for decreased concentration, sleep disturbance, depressed mood and stress. The patient is nervous/anxious.        Objective     Physical Exam   Constitutional: She is oriented to person, place, and time. She appears well-developed and well-nourished. No distress.   HENT:   Head: Normocephalic.   Oral erythema and patchy lesions   Eyes: Pupils are equal, round, and reactive to light. Conjunctivae are normal.   Neck: Normal range of motion. Neck supple. No JVD present. No thyromegaly present.   Cardiovascular: Normal rate, regular rhythm and normal heart sounds.   No murmur heard.  Pulmonary/Chest: Effort normal and breath sounds normal. No respiratory distress.   Abdominal: Soft. Bowel sounds are normal. She exhibits no distension. There is no tenderness.   Musculoskeletal: Normal range of motion. She exhibits tenderness. She exhibits no edema.   Multi-joint lumbar and thoracic spine tenderness to palpation with mild limited range of motion.    +Right plantar fasciitis  Left carpal tunnel syndrome, + Phalen's    Neurological: She is alert and oriented to person, place, and time. A sensory deficit is present.   Skin: Skin is warm and dry. No rash noted. She is not diaphoretic. No erythema.   Psychiatric: Her behavior is normal. Judgment normal. Her mood appears anxious. Thought content is paranoid.   Nursing note and vitals reviewed.        Assessment/Plan   Emilee was seen today for back pain.    Diagnoses and all orders for this visit:    Myofascial pain syndrome of thoracic spine  -     Ambulatory Referral to Pain Management  Referral to [facility] pain management for thoracic pain, patient is already current for lumbar spine pain    Plantar fasciitis of right foot  Patient given exercises for plantar fasciitis  Recommend to get insoles with good arch support    Carpal tunnel syndrome on left  -     Ambulatory Referral to Hand Surgery  Referral to [facility] hand surgery team    Other fatigue  Likely secondary to stress, ill family members, encouraged patient to eat well, sleep well exercise when she can  Continue OTC multivitamin, recommend B12 and vitamin D OTC    Anxiety disorder due to known physiological condition    Lichen planus  -     Ambulatory Referral to ENT (Otolaryngology)        Continue with rheum, recommend retry Lyrica and if develops rash then stop  Return to office in 3 to 6 months as needed         Glucose   Date Value Ref Range Status   01/09/2020 97 65 - 99 mg/dL Final     BUN   Date Value Ref Range Status   01/09/2020 16 8 - 23 mg/dL Final     Creatinine   Date Value Ref Range Status   01/09/2020 0.62 0.57 - 1.00 mg/dL Final     Sodium   Date Value Ref Range Status   01/09/2020 139 136 - 145 mmol/L Final     Potassium   Date Value Ref Range Status   01/09/2020 4.5 3.5 - 5.2 mmol/L Final     Chloride   Date Value Ref Range Status   01/09/2020 100 98 - 107 mmol/L Final     CO2   Date Value Ref Range Status   01/09/2020 24.8 22.0 - 29.0 mmol/L Final     Calcium   Date Value Ref Range Status    01/09/2020 9.5 8.6 - 10.5 mg/dL Final     Total Protein   Date Value Ref Range Status   01/09/2020 6.9 6.0 - 8.5 g/dL Final     Albumin   Date Value Ref Range Status   01/09/2020 4.30 3.50 - 5.20 g/dL Final     ALT (SGPT)   Date Value Ref Range Status   01/09/2020 11 1 - 33 U/L Final     AST (SGOT)   Date Value Ref Range Status   01/09/2020 15 1 - 32 U/L Final     Alkaline Phosphatase   Date Value Ref Range Status   01/09/2020 69 39 - 117 U/L Final     Total Bilirubin   Date Value Ref Range Status   01/09/2020 0.6 0.2 - 1.2 mg/dL Final     eGFR Non  Amer   Date Value Ref Range Status   01/09/2020 96 >60 mL/min/1.73 Final     A/G Ratio   Date Value Ref Range Status   01/24/2019 1.8 (H) 1.0 - 1.7 Final     BUN/Creatinine Ratio   Date Value Ref Range Status   01/09/2020 25.8 (H) 7.0 - 25.0 Final     Anion Gap   Date Value Ref Range Status   01/09/2020 14.2 5.0 - 15.0 mmol/L Final

## 2020-03-12 ENCOUNTER — TELEPHONE (OUTPATIENT)
Dept: FAMILY MEDICINE CLINIC | Facility: CLINIC | Age: 65
End: 2020-03-12

## 2020-03-12 NOTE — TELEPHONE ENCOUNTER
Pt called said still waiting on referral to pain management.  Different ref needed as she stated it was a different pain then what she was originally going for. bm

## 2020-03-12 NOTE — TELEPHONE ENCOUNTER
There was a misunderstanding with the pain clinic.  I sent the referral back to the pain clinic to be scheduled.

## 2020-04-07 RX ORDER — DULOXETIN HYDROCHLORIDE 60 MG/1
60 CAPSULE, DELAYED RELEASE ORAL DAILY
Qty: 90 CAPSULE | Refills: 0 | Status: SHIPPED | OUTPATIENT
Start: 2020-04-07 | End: 2020-06-30

## 2020-04-20 RX ORDER — ATORVASTATIN CALCIUM 40 MG/1
40 TABLET, FILM COATED ORAL NIGHTLY
Qty: 90 TABLET | Refills: 0 | Status: SHIPPED | OUTPATIENT
Start: 2020-04-20 | End: 2020-07-24

## 2020-04-27 ENCOUNTER — LAB REQUISITION (OUTPATIENT)
Dept: LAB | Facility: HOSPITAL | Age: 65
End: 2020-04-27

## 2020-04-27 DIAGNOSIS — Z00.00 ENCOUNTER FOR GENERAL ADULT MEDICAL EXAMINATION WITHOUT ABNORMAL FINDINGS: ICD-10-CM

## 2020-04-27 PROCEDURE — U0003 INFECTIOUS AGENT DETECTION BY NUCLEIC ACID (DNA OR RNA); SEVERE ACUTE RESPIRATORY SYNDROME CORONAVIRUS 2 (SARS-COV-2) (CORONAVIRUS DISEASE [COVID-19]), AMPLIFIED PROBE TECHNIQUE, MAKING USE OF HIGH THROUGHPUT TECHNOLOGIES AS DESCRIBED BY CMS-2020-01-R: HCPCS | Performed by: EMERGENCY MEDICINE

## 2020-04-29 LAB — SARS-COV-2 RNA RESP QL NAA+PROBE: NOT DETECTED

## 2020-06-01 ENCOUNTER — OFFICE (AMBULATORY)
Dept: URBAN - METROPOLITAN AREA CLINIC 64 | Facility: CLINIC | Age: 65
End: 2020-06-01

## 2020-06-01 VITALS
HEART RATE: 87 BPM | DIASTOLIC BLOOD PRESSURE: 89 MMHG | WEIGHT: 139 LBS | HEIGHT: 63 IN | SYSTOLIC BLOOD PRESSURE: 155 MMHG

## 2020-06-01 DIAGNOSIS — R10.31 RIGHT LOWER QUADRANT PAIN: ICD-10-CM

## 2020-06-01 DIAGNOSIS — R10.32 LEFT LOWER QUADRANT PAIN: ICD-10-CM

## 2020-06-01 DIAGNOSIS — K59.00 CONSTIPATION, UNSPECIFIED: ICD-10-CM

## 2020-06-01 DIAGNOSIS — R10.12 LEFT UPPER QUADRANT PAIN: ICD-10-CM

## 2020-06-01 DIAGNOSIS — K63.3 ULCER OF INTESTINE: ICD-10-CM

## 2020-06-01 PROCEDURE — 99214 OFFICE O/P EST MOD 30 MIN: CPT | Performed by: INTERNAL MEDICINE

## 2020-06-01 RX ORDER — OMEPRAZOLE 40 MG/1
80 CAPSULE, DELAYED RELEASE ORAL
Qty: 180 | Refills: 3 | Status: ACTIVE
Start: 2020-06-01 | End: 2021-09-09

## 2020-06-29 NOTE — TELEPHONE ENCOUNTER
Pt called left vm said she needed a referral to the [facility] in  [place] her bladder has fallen.  Do you need to see her first or can you do the referral. Please advise. Thanks bm

## 2020-06-30 ENCOUNTER — TELEPHONE (OUTPATIENT)
Dept: FAMILY MEDICINE CLINIC | Facility: CLINIC | Age: 65
End: 2020-06-30

## 2020-06-30 RX ORDER — DULOXETIN HYDROCHLORIDE 60 MG/1
60 CAPSULE, DELAYED RELEASE ORAL DAILY
Qty: 90 CAPSULE | Refills: 0 | Status: SHIPPED | OUTPATIENT
Start: 2020-06-30 | End: 2020-10-02

## 2020-07-01 ENCOUNTER — OFFICE VISIT (OUTPATIENT)
Dept: FAMILY MEDICINE CLINIC | Facility: CLINIC | Age: 65
End: 2020-07-01

## 2020-07-01 VITALS
OXYGEN SATURATION: 98 % | SYSTOLIC BLOOD PRESSURE: 148 MMHG | BODY MASS INDEX: 23.01 KG/M2 | WEIGHT: 134.8 LBS | HEART RATE: 73 BPM | TEMPERATURE: 97.3 F | HEIGHT: 64 IN | DIASTOLIC BLOOD PRESSURE: 88 MMHG

## 2020-07-01 DIAGNOSIS — N81.10 BLADDER PROLAPSE, FEMALE, ACQUIRED: Primary | ICD-10-CM

## 2020-07-01 DIAGNOSIS — R10.84 GENERALIZED ABDOMINAL PAIN: ICD-10-CM

## 2020-07-01 DIAGNOSIS — E87.5 HYPERKALEMIA: ICD-10-CM

## 2020-07-01 DIAGNOSIS — E11.9 TYPE 2 DIABETES MELLITUS WITHOUT COMPLICATION, WITHOUT LONG-TERM CURRENT USE OF INSULIN (HCC): ICD-10-CM

## 2020-07-01 DIAGNOSIS — N39.0 FREQUENT URINARY TRACT INFECTIONS: ICD-10-CM

## 2020-07-01 DIAGNOSIS — E78.2 MIXED HYPERLIPIDEMIA: ICD-10-CM

## 2020-07-01 PROCEDURE — 99214 OFFICE O/P EST MOD 30 MIN: CPT | Performed by: NURSE PRACTITIONER

## 2020-07-01 RX ORDER — FLUTICASONE PROPIONATE 50 MCG
SPRAY, SUSPENSION (ML) NASAL
COMMUNITY
Start: 2020-06-25 | End: 2022-05-10

## 2020-07-01 RX ORDER — METFORMIN HYDROCHLORIDE 500 MG/1
500 TABLET, EXTENDED RELEASE ORAL 2 TIMES DAILY
Qty: 180 TABLET | Refills: 1 | Status: SHIPPED | OUTPATIENT
Start: 2020-07-01 | End: 2020-08-05

## 2020-07-01 RX ORDER — TERBINAFINE HYDROCHLORIDE 250 MG/1
250 TABLET ORAL DAILY
Status: ON HOLD | COMMUNITY
Start: 2020-06-25 | End: 2022-03-29

## 2020-07-01 RX ORDER — AMOXICILLIN 500 MG/1
500 CAPSULE ORAL
Status: ON HOLD | COMMUNITY
Start: 2020-05-18 | End: 2022-03-29

## 2020-07-01 RX ORDER — OMEPRAZOLE 40 MG/1
40 CAPSULE, DELAYED RELEASE ORAL DAILY
COMMUNITY
Start: 2020-06-01

## 2020-07-01 RX ORDER — DICYCLOMINE HCL 20 MG
TABLET ORAL
Status: ON HOLD | COMMUNITY
Start: 2020-06-02 | End: 2022-03-29

## 2020-07-01 RX ORDER — PHENAZOPYRIDINE 200 MG/1
TABLET, FILM COATED ORAL
Status: ON HOLD | COMMUNITY
Start: 2020-06-16 | End: 2022-03-29

## 2020-07-01 NOTE — PROGRESS NOTES
Chief Complaint   Patient presents with   • Bladder Problem       HPI     Bladder prolapse, Pt follows with urology, Dr. Rendon, has chronic UTI, awaiting ua/cs and has rx for macrobid once cx received if needed. Pt reports bladder dropped after hysterectomy. On estrace vaginal with significant amount of daily abdominal pain and discomfort, also follows with Dr. Scott who has performed colonoscopies in the past.    Diabetes mellitus type II, patient had labs recently completed and would like to review, feels stable on meds, denies any signs/symptoms of hyper/hypoglycemia, blurry vision, polydipsia, polyuria, nocturia, and unintentional weight loss    The following portions of the patient's history were reviewed and updated as appropriate: allergies, current medications, past family history, past medical history, past social history, past surgical history and problem list.    Past Medical History:   Diagnosis Date   • Anxiety    • Depression    • Hyperlipidemia    • Lichen planus    • Tachycardia      Past Surgical History:   Procedure Laterality Date   • COLON SURGERY  2003    PART OF COLON/REMOVED   • TOTAL ABDOMINAL HYSTERECTOMY  1987     Family History   Problem Relation Age of Onset   • Cancer Other    • Ovarian cancer Maternal Grandmother 80   • Breast cancer Paternal Aunt 54     Social History     Tobacco Use   • Smoking status: Never Smoker   Substance Use Topics   • Alcohol use: No     Frequency: Never         Current Outpatient Medications:   •  amoxicillin (AMOXIL) 500 MG capsule, Take 500 mg by mouth every night at bedtime., Disp: , Rfl:   •  atorvastatin (LIPITOR) 40 MG tablet, Take 1 tablet by mouth Every Night., Disp: 90 tablet, Rfl: 0  •  dicyclomine (BENTYL) 20 MG tablet, , Disp: , Rfl:   •  DULoxetine (CYMBALTA) 60 MG capsule, TAKE 1 CAPSULE BY MOUTH DAILY, Disp: 90 capsule, Rfl: 0  •  ESTRACE VAGINAL 0.1 MG/GM vaginal cream, , Disp: , Rfl: 0  •  fluticasone (FLONASE) 50 MCG/ACT nasal spray, USE 1 TO  "2 SPRAYS IN EACH NSOTRIL D., Disp: , Rfl:   •  glucose blood (FREESTYLE LITE) test strip, TEST twice a day, Disp: , Rfl:   •  glucose blood (ONE TOUCH ULTRA TEST) test strip, ONETOUCH ULTRA BLUE STRP, Disp: , Rfl:   •  hydrocortisone 2.5 % cream, HYDROCORTISONE 2.5 % CREA, Disp: , Rfl:   •  metFORMIN ER (GLUCOPHAGE-XR) 500 MG 24 hr tablet, Take 1 tablet by mouth 2 (Two) Times a Day., Disp: 180 tablet, Rfl: 1  •  Multiple Vitamin (MULTI-DAY VITAMINS) tablet, MULTI-DAY VITAMINS TABS, Disp: , Rfl:   •  nitrofurantoin (MACRODANTIN) 100 MG capsule, , Disp: , Rfl: 0  •  omeprazole (priLOSEC) 40 MG capsule, TK 1 C PO BID AC, Disp: , Rfl:   •  ONETOUCH DELICA LANCETS 33G misc, ONETOUCH DELICA LANCETS 33G, Disp: , Rfl:   •  pregabalin (LYRICA) 50 MG capsule, Take 1 capsule by mouth 3 (Three) Times a Day., Disp: 90 capsule, Rfl: 2  •  PYRIDIUM 200 MG tablet, TK 1 T PO TID PRN, Disp: , Rfl:   •  terbinafine (lamiSIL) 250 MG tablet, Take 250 mg by mouth Daily., Disp: , Rfl:   •  traMADol ER (ULTRAM-ER) 100 MG 24 hr tablet, , Disp: , Rfl:   •  triamcinolone (KENALOG) 0.1 % paste, Apply 1 application to the mouth or throat., Disp: , Rfl:       Review of Systems       A full 12 point review of systems has been obtained as mentioned in HPI, otherwise negative      Vitals:    07/01/20 1140   BP: 148/88   BP Location: Left arm   Patient Position: Sitting   Cuff Size: Adult   Pulse: 73   Temp: 97.3 °F (36.3 °C)   TempSrc: Skin   SpO2: 98%   Weight: 61.1 kg (134 lb 12.8 oz)   Height: 161.3 cm (63.5\")     Body mass index is 23.5 kg/m².    Physical Exam   Constitutional: She is oriented to person, place, and time. She appears well-developed and well-nourished. No distress.   Eyes: Pupils are equal, round, and reactive to light.   Neck: Normal range of motion. Neck supple. No JVD present. No thyromegaly present.   Cardiovascular: Normal rate, regular rhythm, normal heart sounds and intact distal pulses.   No murmur " heard.  Pulmonary/Chest: Effort normal and breath sounds normal. No respiratory distress.   Abdominal: Soft. Bowel sounds are normal. She exhibits no distension. There is no tenderness.   Musculoskeletal: Normal range of motion. She exhibits no tenderness.   Neurological: She is alert and oriented to person, place, and time. No sensory deficit.   Skin: Skin is warm and dry. She is not diaphoretic.   Psychiatric: She has a normal mood and affect. Her behavior is normal. Judgment and thought content normal.   Nursing note and vitals reviewed.      No visits with results within 7 Day(s) from this visit.   Latest known visit with results is:   Lab Requisition on 04/27/2020   Component Date Value Ref Range Status   • SARS-CoV-2, ANDRÉS 04/27/2020 Not Detected  Not Detected Final    Testing was performed using the vero(R) SARS-CoV-2 test.  This test was developed and its performance characteristics determined  by [facility]. This test has not been FDA cleared or  approved. This test has been authorized by FDA under an Emergency Use  Authorization (EUA). This test is only authorized for the duration of  time the declaration that circumstances exist justifying the  authorization of the emergency use of in vitro diagnostic tests for  detection of SARS-CoV-2 virus and/or diagnosis of COVID-19 infection  under section 564(b)(1) of the Act, 21 U.S.C. 360bbb-3(b)(1), unless  the authorization is terminated or revoked sooner.       Diagnoses and all orders for this visit:    1. Bladder prolapse, female, acquired (Primary)  -     Ambulatory Referral to Gynecology    2. Generalized abdominal pain    3. Hyperkalemia    4. Frequent urinary tract infections    5. Type 2 diabetes mellitus without complication, without long-term current use of insulin (CMS/[facility])    6. Mixed hyperlipidemia    Other orders  -     metFORMIN ER (GLUCOPHAGE-XR) 500 MG 24 hr tablet; Take 1 tablet by mouth 2 (Two) Times a Day.  Dispense: 180 tablet;  Refill: 1       reviewed labs, rf metformin, hemoglobin A1c is stable  Keep f/u with yusra  Referral to Dr. Matson.   Continue pyridium, abx prn, okay for patient to stop in this office for urine dip/culture if needed in the future  Limit high potassium foods in diet, increase water intake  Refill atorvastatin when needed, lipid panel stable  Return to office in 6 months or earlier if needed

## 2020-07-02 ENCOUNTER — OFFICE (AMBULATORY)
Dept: URBAN - METROPOLITAN AREA CLINIC 64 | Facility: CLINIC | Age: 65
End: 2020-07-02

## 2020-07-02 VITALS
WEIGHT: 134 LBS | HEART RATE: 98 BPM | SYSTOLIC BLOOD PRESSURE: 131 MMHG | HEIGHT: 63 IN | DIASTOLIC BLOOD PRESSURE: 84 MMHG

## 2020-07-02 DIAGNOSIS — E11.9 TYPE 2 DIABETES MELLITUS WITHOUT COMPLICATIONS: ICD-10-CM

## 2020-07-02 DIAGNOSIS — K59.00 CONSTIPATION, UNSPECIFIED: ICD-10-CM

## 2020-07-02 DIAGNOSIS — K21.9 GASTRO-ESOPHAGEAL REFLUX DISEASE WITHOUT ESOPHAGITIS: ICD-10-CM

## 2020-07-02 DIAGNOSIS — K63.3 ULCER OF INTESTINE: ICD-10-CM

## 2020-07-02 DIAGNOSIS — R10.33 PERIUMBILICAL PAIN: ICD-10-CM

## 2020-07-02 PROCEDURE — 99213 OFFICE O/P EST LOW 20 MIN: CPT | Performed by: INTERNAL MEDICINE

## 2020-07-24 RX ORDER — ATORVASTATIN CALCIUM 40 MG/1
40 TABLET, FILM COATED ORAL NIGHTLY
Qty: 90 TABLET | Refills: 0 | Status: SHIPPED | OUTPATIENT
Start: 2020-07-24 | End: 2020-10-19

## 2020-08-05 RX ORDER — METFORMIN HYDROCHLORIDE 500 MG/1
TABLET, EXTENDED RELEASE ORAL
Qty: 180 TABLET | Refills: 1 | Status: SHIPPED | OUTPATIENT
Start: 2020-08-05 | End: 2022-07-27

## 2020-10-02 RX ORDER — DULOXETIN HYDROCHLORIDE 60 MG/1
60 CAPSULE, DELAYED RELEASE ORAL DAILY
Qty: 90 CAPSULE | Refills: 0 | Status: SHIPPED | OUTPATIENT
Start: 2020-10-02 | End: 2020-11-06

## 2020-10-19 RX ORDER — ATORVASTATIN CALCIUM 40 MG/1
40 TABLET, FILM COATED ORAL NIGHTLY
Qty: 90 TABLET | Refills: 1 | Status: SHIPPED | OUTPATIENT
Start: 2020-10-19

## 2020-11-06 RX ORDER — DULOXETIN HYDROCHLORIDE 60 MG/1
60 CAPSULE, DELAYED RELEASE ORAL DAILY
Qty: 90 CAPSULE | Refills: 1 | Status: SHIPPED | OUTPATIENT
Start: 2020-11-06

## 2020-12-16 ENCOUNTER — TELEPHONE (OUTPATIENT)
Dept: FAMILY MEDICINE CLINIC | Facility: CLINIC | Age: 65
End: 2020-12-16

## 2020-12-16 NOTE — TELEPHONE ENCOUNTER
Called pt to schedule AWV and she stated that she is no longer coming to the office. She is now seeing a provider in [place].

## 2021-03-12 ENCOUNTER — APPOINTMENT (OUTPATIENT)
Dept: GENERAL RADIOLOGY | Facility: HOSPITAL | Age: 66
End: 2021-03-12

## 2021-03-12 ENCOUNTER — HOSPITAL ENCOUNTER (EMERGENCY)
Facility: HOSPITAL | Age: 66
Discharge: HOME | End: 2021-03-12
Attending: EMERGENCY MEDICINE | Admitting: EMERGENCY MEDICINE

## 2021-03-12 VITALS
BODY MASS INDEX: 24.45 KG/M2 | SYSTOLIC BLOOD PRESSURE: 141 MMHG | OXYGEN SATURATION: 97 % | DIASTOLIC BLOOD PRESSURE: 69 MMHG | HEIGHT: 63 IN | HEART RATE: 71 BPM | RESPIRATION RATE: 16 BRPM | WEIGHT: 138 LBS | TEMPERATURE: 98.2 F

## 2021-03-12 DIAGNOSIS — S80.01XA CONTUSION OF RIGHT KNEE, INITIAL ENCOUNTER: Primary | ICD-10-CM
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
## 2021-03-12 PROCEDURE — 73564 X-RAY EXAM KNEE 4 OR MORE: CPT

## 2021-03-12 PROCEDURE — 99283 EMERGENCY DEPT VISIT LOW MDM: CPT

## 2021-03-12 RX ORDER — NAPROXEN 375 MG/1
375 TABLET ORAL 2 TIMES DAILY PRN
Qty: 14 TABLET | Refills: 0 | Status: SHIPPED | OUTPATIENT
Start: 2021-03-12 | End: 2022-03-29 | Stop reason: HOSPADM

## 2021-03-12 NOTE — ED PROVIDER NOTES
Subjective   Patient is a 65-year-old female complaining of right knee pain after she had fallen on her knee.  She denies other associated complaints.  The pain is moderate and constant.          Review of Systems  For numbness tingling or other complaint of injury  Past Medical History:   Diagnosis Date   • Anxiety    • Depression    • Diabetes mellitus (CMS/HCC)    • Hyperlipidemia    • Lichen planus     MOUTH - CAUSES SORES, WEBBING AND INFLAMATION   • Tachycardia        Allergies   Allergen Reactions   • Iodine Shortness Of Breath   • Prednisone Dizziness   • Sulfa Antibiotics Rash       Past Surgical History:   Procedure Laterality Date   • COLON SURGERY  2003    PART OF COLON/REMOVED   • TOTAL ABDOMINAL HYSTERECTOMY  1987       Family History   Problem Relation Age of Onset   • Cancer Other    • Ovarian cancer Maternal Grandmother 80   • Breast cancer Paternal Aunt 54       Social History     Socioeconomic History   • Marital status:      Spouse name: Not on file   • Number of children: Not on file   • Years of education: Not on file   • Highest education level: Not on file   Tobacco Use   • Smoking status: Never Smoker   Substance and Sexual Activity   • Alcohol use: No   • Drug use: No           Objective   Physical Exam  Extremity exam shows pain to palpation over her patella.  She has some minimal swelling with ecchymosis.  She has no joint effusion or joint laxity.  She has 2+ pulses and is neurovascular intact.  Procedures           ED Course          XR Knee 4+ View Right    Result Date: 3/12/2021   1. No acute bony abnormality of the knee.  Electronically Signed By-Guero Nair MD On:3/12/2021 6:18 PM This report was finalized on [id number] by  Guero Nair MD.                                      MDM  Number of Diagnoses or Management Options  Diagnosis management comments: Patient is findings consistent with right knee contusion.  Patient will be discharged with a prescription Naprosyn  will see MD for recheck.       Amount and/or Complexity of Data Reviewed  Tests in the radiology section of CPT®: reviewed    Risk of Complications, Morbidity, and/or Mortality  Presenting problems: moderate  Diagnostic procedures: moderate  Management options: moderate    Patient Progress  Patient progress: stable      Final diagnoses:   Contusion of right knee, initial encounter   Fall, initial encounter            Sebastian Singh MD  03/12/21 [id number]

## 2021-03-12 NOTE — ED NOTES
Pt c/o right knee pain s/p trip and fall neg LOC on thinners. Ice pack applied     Makenna Colmenares RN  03/12/21 [id number]

## 2021-06-09 ENCOUNTER — HOSPITAL ENCOUNTER (EMERGENCY)
Facility: HOSPITAL | Age: 66
Discharge: HOME | End: 2021-06-09
Admitting: EMERGENCY MEDICINE

## 2021-06-09 ENCOUNTER — APPOINTMENT (OUTPATIENT)
Dept: CT IMAGING | Facility: HOSPITAL | Age: 66
End: 2021-06-09

## 2021-06-09 VITALS
SYSTOLIC BLOOD PRESSURE: 138 MMHG | OXYGEN SATURATION: 98 % | RESPIRATION RATE: 16 BRPM | TEMPERATURE: 98.4 F | DIASTOLIC BLOOD PRESSURE: 89 MMHG | WEIGHT: 135.58 LBS | BODY MASS INDEX: 24.02 KG/M2 | HEART RATE: 74 BPM | HEIGHT: 63 IN

## 2021-06-09 DIAGNOSIS — R10.84 GENERALIZED ABDOMINAL PAIN: Primary | ICD-10-CM

## 2021-06-09 LAB
ALBUMIN SERPL-MCNC: 4.6 G/DL (ref 3.5–5.2)
ALBUMIN/GLOB SERPL: 1.9 G/DL
ALP SERPL-CCNC: 84 U/L (ref 39–117)
ALT SERPL W P-5'-P-CCNC: 16 U/L (ref 1–33)
ANION GAP SERPL CALCULATED.3IONS-SCNC: 10 MMOL/L (ref 5–15)
AST SERPL-CCNC: 20 U/L (ref 1–32)
BASOPHILS # BLD AUTO: 0 10*3/MM3 (ref 0–0.2)
BASOPHILS NFR BLD AUTO: 0.6 % (ref 0–1.5)
BILIRUB SERPL-MCNC: 0.4 MG/DL (ref 0–1.2)
BILIRUB UR QL STRIP: NEGATIVE
BUN SERPL-MCNC: 11 MG/DL (ref 8–23)
BUN/CREAT SERPL: 19 (ref 7–25)
CALCIUM SPEC-SCNC: 9.5 MG/DL (ref 8.6–10.5)
CHLORIDE SERPL-SCNC: 101 MMOL/L (ref 98–107)
CLARITY UR: CLEAR
CO2 SERPL-SCNC: 29 MMOL/L (ref 22–29)
COLOR UR: YELLOW
CREAT SERPL-MCNC: 0.58 MG/DL (ref 0.57–1)
DEPRECATED RDW RBC AUTO: 40.7 FL (ref 37–54)
EOSINOPHIL # BLD AUTO: 0.1 10*3/MM3 (ref 0–0.4)
EOSINOPHIL NFR BLD AUTO: 2.3 % (ref 0.3–6.2)
ERYTHROCYTE [DISTWIDTH] IN BLOOD BY AUTOMATED COUNT: 14.6 % (ref 12.3–15.4)
GFR SERPL CREATININE-BSD FRML MDRD: 104 ML/MIN/1.73
GLOBULIN UR ELPH-MCNC: 2.4 GM/DL
GLUCOSE SERPL-MCNC: 83 MG/DL (ref 65–99)
GLUCOSE UR STRIP-MCNC: NEGATIVE MG/DL
HCT VFR BLD AUTO: 38.1 % (ref 34–46.6)
HGB BLD-MCNC: 12.6 G/DL (ref 12–15.9)
HGB UR QL STRIP.AUTO: NEGATIVE
HOLD SPECIMEN: NORMAL
HOLD SPECIMEN: NORMAL
KETONES UR QL STRIP: NEGATIVE
LEUKOCYTE ESTERASE UR QL STRIP.AUTO: NEGATIVE
LIPASE SERPL-CCNC: 34 U/L (ref 13–60)
LYMPHOCYTES # BLD AUTO: 2 10*3/MM3 (ref 0.7–3.1)
LYMPHOCYTES NFR BLD AUTO: 36.5 % (ref 19.6–45.3)
MCH RBC QN AUTO: 26.6 PG (ref 26.6–33)
MCHC RBC AUTO-ENTMCNC: 33.1 G/DL (ref 31.5–35.7)
MCV RBC AUTO: 80.4 FL (ref 79–97)
MONOCYTES # BLD AUTO: 0.4 10*3/MM3 (ref 0.1–0.9)
MONOCYTES NFR BLD AUTO: 6.8 % (ref 5–12)
NEUTROPHILS NFR BLD AUTO: 2.9 10*3/MM3 (ref 1.7–7)
NEUTROPHILS NFR BLD AUTO: 53.8 % (ref 42.7–76)
NITRITE UR QL STRIP: NEGATIVE
NRBC BLD AUTO-RTO: 0.2 /100 WBC (ref 0–0.2)
PH UR STRIP.AUTO: 7 [PH] (ref 5–8)
PLATELET # BLD AUTO: 217 10*3/MM3 (ref 140–450)
PMV BLD AUTO: 8.5 FL (ref 6–12)
POTASSIUM SERPL-SCNC: 3.9 MMOL/L (ref 3.5–5.2)
PROT SERPL-MCNC: 7 G/DL (ref 6–8.5)
PROT UR QL STRIP: NEGATIVE
RBC # BLD AUTO: 4.74 10*6/MM3 (ref 3.77–5.28)
SODIUM SERPL-SCNC: 140 MMOL/L (ref 136–145)
SP GR UR STRIP: 1.01 (ref 1–1.03)
UROBILINOGEN UR QL STRIP: NORMAL
WBC # BLD AUTO: 5.4 10*3/MM3 (ref 3.4–10.8)
WHOLE BLOOD HOLD SPECIMEN: NORMAL

## 2021-06-09 PROCEDURE — 25010000002 MORPHINE PER 10 MG: Performed by: NURSE PRACTITIONER

## 2021-06-09 PROCEDURE — 25010000002 ONDANSETRON PER 1 MG: Performed by: NURSE PRACTITIONER

## 2021-06-09 PROCEDURE — 81003 URINALYSIS AUTO W/O SCOPE: CPT | Performed by: NURSE PRACTITIONER

## 2021-06-09 PROCEDURE — 96376 TX/PRO/DX INJ SAME DRUG ADON: CPT

## 2021-06-09 PROCEDURE — 96374 THER/PROPH/DIAG INJ IV PUSH: CPT

## 2021-06-09 PROCEDURE — 83690 ASSAY OF LIPASE: CPT | Performed by: NURSE PRACTITIONER

## 2021-06-09 PROCEDURE — 96375 TX/PRO/DX INJ NEW DRUG ADDON: CPT

## 2021-06-09 PROCEDURE — 0 IOPAMIDOL PER 1 ML: Performed by: NURSE PRACTITIONER

## 2021-06-09 PROCEDURE — 99283 EMERGENCY DEPT VISIT LOW MDM: CPT

## 2021-06-09 PROCEDURE — 80053 COMPREHEN METABOLIC PANEL: CPT | Performed by: NURSE PRACTITIONER

## 2021-06-09 PROCEDURE — 74177 CT ABD & PELVIS W/CONTRAST: CPT

## 2021-06-09 PROCEDURE — 85025 COMPLETE CBC W/AUTO DIFF WBC: CPT | Performed by: NURSE PRACTITIONER

## 2021-06-09 RX ORDER — ONDANSETRON 2 MG/ML
4 INJECTION INTRAMUSCULAR; INTRAVENOUS ONCE
Status: COMPLETED | OUTPATIENT
Start: 2021-06-09 | End: 2021-06-09

## 2021-06-09 RX ORDER — METHYLPREDNISOLONE SODIUM SUCCINATE 125 MG/2ML
125 INJECTION, POWDER, LYOPHILIZED, FOR SOLUTION INTRAMUSCULAR; INTRAVENOUS ONCE
Status: DISCONTINUED | OUTPATIENT
Start: 2021-06-09 | End: 2021-06-10 | Stop reason: HOSPADM

## 2021-06-09 RX ORDER — MORPHINE SULFATE 4 MG/ML
4 INJECTION, SOLUTION INTRAMUSCULAR; INTRAVENOUS ONCE
Status: COMPLETED | OUTPATIENT
Start: 2021-06-09 | End: 2021-06-09

## 2021-06-09 RX ORDER — SODIUM CHLORIDE 0.9 % (FLUSH) 0.9 %
10 SYRINGE (ML) INJECTION AS NEEDED
Status: DISCONTINUED | OUTPATIENT
Start: 2021-06-09 | End: 2021-06-10 | Stop reason: HOSPADM

## 2021-06-09 RX ORDER — DIPHENHYDRAMINE HYDROCHLORIDE 50 MG/ML
25 INJECTION INTRAMUSCULAR; INTRAVENOUS ONCE
Status: DISCONTINUED | OUTPATIENT
Start: 2021-06-09 | End: 2021-06-10 | Stop reason: HOSPADM

## 2021-06-09 RX ADMIN — IOPAMIDOL 100 ML: 755 INJECTION, SOLUTION INTRAVENOUS at 21:18

## 2021-06-09 RX ADMIN — ONDANSETRON 4 MG: 2 INJECTION INTRAMUSCULAR; INTRAVENOUS at 18:37

## 2021-06-09 RX ADMIN — MORPHINE SULFATE 4 MG: 4 INJECTION INTRAVENOUS at 22:36

## 2021-06-09 RX ADMIN — MORPHINE SULFATE 4 MG: 4 INJECTION INTRAVENOUS at 18:37

## 2021-06-09 NOTE — ED NOTES
Pt states that she does not need to be pre-medicated in order to go to CT. Provider aware     Lay Evans LPN  06/09/21 1949

## 2021-06-09 NOTE — ED PROVIDER NOTES
Subjective   Patient presents with:  Abdominal Pain: abd pain for one week, pt has been taking otc medicatons, no relief today sharp pains in abd and swelling          History provided by:  Patient  Abdominal Pain  Pain location:  Periumbilical and suprapubic  Pain quality: shooting    Pain radiates to:  Periumbilical region and suprapubic region  Pain severity:  Moderate  Onset quality:  Sudden  Timing:  Constant  Progression:  Worsening  Chronicity:  New  Context: not alcohol use, not awakening from sleep, not diet changes, not eating, not laxative use, not medication withdrawal, not previous surgeries, not recent illness, not recent sexual activity, not recent travel, not retching, not sick contacts, not suspicious food intake and not trauma    Relieved by:  Nothing  Worsened by:  Nothing  Ineffective treatments:  Acetaminophen (antibiotics, patient unsure which one)  Associated symptoms: diarrhea, dysuria and nausea    Associated symptoms: no anorexia, no belching, no chest pain, no chills, no constipation, no cough, no fatigue, no fever, no flatus, no hematemesis, no hematochezia, no hematuria, no melena, no shortness of breath, no sore throat, no vaginal bleeding, no vaginal discharge and no vomiting    Risk factors: no alcohol abuse, no aspirin use, not elderly, has not had multiple surgeries, no NSAID use, not obese, not pregnant and no recent hospitalization        Review of Systems   Constitutional: Negative for chills, fatigue and fever.   HENT: Negative for sore throat.    Eyes: Negative for photophobia and visual disturbance.   Respiratory: Negative for cough and shortness of breath.    Cardiovascular: Negative for chest pain.   Gastrointestinal: Positive for abdominal pain, diarrhea and nausea. Negative for anorexia, constipation, flatus, hematemesis, hematochezia, melena and vomiting.   Genitourinary: Positive for dysuria. Negative for decreased urine volume, difficulty urinating, enuresis, flank  pain, frequency, genital sores, hematuria, menstrual problem, pelvic pain, urgency, vaginal bleeding, vaginal discharge and vaginal pain.   Musculoskeletal: Negative for back pain and neck pain.   Skin: Negative for color change.   Neurological: Negative for headaches.       Past Medical History:   Diagnosis Date   • Anxiety    • Depression    • Diabetes mellitus (CMS/HCC)    • Hyperlipidemia    • Lichen planus     MOUTH - CAUSES SORES, WEBBING AND INFLAMATION   • Tachycardia        Allergies   Allergen Reactions   • Prednisone Dizziness   • Sulfa Antibiotics Rash       Past Surgical History:   Procedure Laterality Date   • COLON SURGERY  2003    PART OF COLON/REMOVED   • TOTAL ABDOMINAL HYSTERECTOMY  1987       Family History   Problem Relation Age of Onset   • Cancer Other    • Ovarian cancer Maternal Grandmother 80   • Breast cancer Paternal Aunt 54       Social History     Socioeconomic History   • Marital status:      Spouse name: Not on file   • Number of children: Not on file   • Years of education: Not on file   • Highest education level: Not on file   Tobacco Use   • Smoking status: Never Smoker   Substance and Sexual Activity   • Alcohol use: No   • Drug use: No           Objective   Physical Exam  Vitals and nursing note reviewed.   Constitutional:       General: She is not in acute distress.     Appearance: She is well-developed. She is not ill-appearing, toxic-appearing or diaphoretic.   HENT:      Head: Normocephalic and atraumatic.      Mouth/Throat:      Mouth: Mucous membranes are moist.      Pharynx: Oropharynx is clear.   Eyes:      Extraocular Movements: Extraocular movements intact.      Pupils: Pupils are equal, round, and reactive to light.   Cardiovascular:      Rate and Rhythm: Normal rate and regular rhythm.      Heart sounds: No murmur heard.   No friction rub. No gallop.    Pulmonary:      Effort: Pulmonary effort is normal.      Breath sounds: Normal breath sounds.   Abdominal:       General: Abdomen is protuberant. Bowel sounds are normal. There is no distension.      Palpations: Abdomen is soft.      Tenderness: There is abdominal tenderness in the periumbilical area and suprapubic area. There is no guarding or rebound.      Comments: No emesis on exam   Skin:     General: Skin is warm and dry.      Capillary Refill: Capillary refill takes less than 2 seconds.   Neurological:      General: No focal deficit present.      Mental Status: She is alert and oriented to person, place, and time.         Procedures           ED Course      CT Abdomen Pelvis With Contrast   Final Result       1. No acute intra-abdominal or intrapelvic process.   2. Large stool burden consistent with constipation.   3. Ancillary findings as described above.       Electronically Signed By-Francia Carnes MD On:6/9/2021 9:22 PM   This report was finalized on [id number] by  Francia Carnes MD.             Labs Reviewed   LIPASE - Normal   URINALYSIS W/ CULTURE IF INDICATED - Normal    Narrative:     Urine microscopic not indicated.   CBC WITH AUTO DIFFERENTIAL - Normal   RAINBOW DRAW    Narrative:     The following orders were created for panel order [place] Draw.  Procedure                               Abnormality         Status                     ---------                               -----------         ------                     Light Blue Top[[id number]]                                   Final result               Green Top (Gel)[[id number]]                                  Final result               Lavender Top[[id number]]                                                                Gold Top - SST[[id number]]                                   Final result                 Please view results for these tests on the individual orders.   COMPREHENSIVE METABOLIC PANEL    Narrative:     GFR Normal >60  Chronic Kidney Disease <60  Kidney Failure <15     CBC AND DIFFERENTIAL    Narrative:     The following orders were  created for panel order CBC & Differential.  Procedure                               Abnormality         Status                     ---------                               -----------         ------                     CBC Auto Differential[[id number]]        Normal              Final result                 Please view results for these tests on the individual orders.   LIGHT BLUE TOP   GREEN TOP   GOLD TOP - SST                                     MDM  Number of Diagnoses or Management Options  Generalized abdominal pain  Diagnosis management comments: Labs reviewed by me and significant for the following: CT Abdomen Pelvis With Contrast   Final Result         1. No acute intra-abdominal or intrapelvic process.    2. Large stool burden consistent with constipation.    3. Ancillary findings as described above.         Electronically Signed By-Francia Carnes MD On:6/9/2021 9:22 PM    This report was finalized on [id number] by  Francia Carnes MD.     Labs Reviewed  LIPASE - Normal  URINALYSIS W/ CULTURE IF INDICATED - Normal         Narrative: Urine microscopic not indicated.  CBC WITH AUTO DIFFERENTIAL - Normal  RAINBOW DRAW         Narrative: The following orders were created for panel order [place] Draw.                  Procedure                               Abnormality         Status                                     ---------                               -----------         ------                                     Light Blue Top([id number])                                   Final result                               Green Top (Gel)([id number])                                  Final result                               Lavender Top([id number])                                                                                Gold Top - SST([id number])                                   Final result                                                 Please view results for these tests on the individual  orders.  COMPREHENSIVE METABOLIC PANEL         Narrative: GFR Normal >60                  Chronic Kidney Disease <60                  Kidney Failure <15                    CBC AND DIFFERENTIAL         Narrative: The following orders were created for panel order CBC & Differential.                  Procedure                               Abnormality         Status                                     ---------                               -----------         ------                                     CBC Auto Differential([id number])        Normal              Final result                                                 Please view results for these tests on the individual orders.  LIGHT BLUE TOP  GREEN TOP  GOLD TOP - SST      Patient was brought back to the emergency department room for evaluation and  placed on appropriate monitoring.  The above work-up was completed.  He had IV established, blood work obtained.  Vital signs have remained non-concerning, afebrile.  She underwent the above exam and work-up.  Is nontoxic in appearance.  She is not had any nausea or vomiting here in the ED.  CT reveals no acute findings.  If the patient is able to discharge home and follow-up with her primary care provider, as well as her gastroenterologist.    I spoke with the patient at the bedside regarding their plan of care, discharge instruction, home care, prescriptions, and importance follow-up.  We discussed test results at the bedside, including incidental abnormal labs, radiological findings, understands need for follow-up with primary care or specialist if indicated.      Patient was made aware of indications to return to the emergency department.  Patient agrees with the current plan of care for discharge, verbalized understanding of all instructions    Pt is aware that discharge does not mean that nothing is wrong but it indicates no emergency is present and they must continue care with follow-up as given below or  physician of their choice    Plan and Disposition:         Amount and/or Complexity of Data Reviewed  Clinical lab tests: reviewed  Tests in the radiology section of CPT®: reviewed    Patient Progress  Patient progress: stable      Final diagnoses:   Generalized abdominal pain       ED Disposition  ED Disposition     ED Disposition Condition Comment    Discharge Stable           Ajay Tolbert, NP-C  [address] [facility] [place] [address] [phone]    Schedule an appointment as soon as possible for a visit       [facility] EMERGENCY DEPARTMENT  [address] [facility] [address] [phone]    As needed, If symptoms worsen    Gastroenterology [facility]  [phone]  [address] [place] [address]  Schedule an appointment as soon as possible for a visit            Medication List      No changes were made to your prescriptions during this visit.          Veronica Franco, APRN  06/10/21 0334

## 2021-06-10 NOTE — ED NOTES
CT was called abut ETA for exam. They will be here shortly to get her. Provider aware     Lay Evans LPN  06/09/21 [id number]

## 2021-06-10 NOTE — DISCHARGE INSTRUCTIONS
Please follow-up with your primary care provider, call for an appointment  Return to the ED for worsening symptoms  Please follow up with yourgastroenterologist, call for appointment

## 2021-07-14 ENCOUNTER — OFFICE (AMBULATORY)
Dept: URBAN - METROPOLITAN AREA CLINIC 64 | Facility: CLINIC | Age: 66
End: 2021-07-14

## 2021-07-14 VITALS
HEART RATE: 93 BPM | SYSTOLIC BLOOD PRESSURE: 144 MMHG | DIASTOLIC BLOOD PRESSURE: 84 MMHG | HEIGHT: 63 IN | WEIGHT: 133 LBS

## 2021-07-14 DIAGNOSIS — R11.10 VOMITING, UNSPECIFIED: ICD-10-CM

## 2021-07-14 DIAGNOSIS — R10.12 LEFT UPPER QUADRANT PAIN: ICD-10-CM

## 2021-07-14 DIAGNOSIS — Z80.0 FAMILY HISTORY OF MALIGNANT NEOPLASM OF DIGESTIVE ORGANS: ICD-10-CM

## 2021-07-14 DIAGNOSIS — F41.9 ANXIETY DISORDER, UNSPECIFIED: ICD-10-CM

## 2021-07-14 DIAGNOSIS — K58.2 MIXED IRRITABLE BOWEL SYNDROME: ICD-10-CM

## 2021-07-14 DIAGNOSIS — R10.31 RIGHT LOWER QUADRANT PAIN: ICD-10-CM

## 2021-07-14 DIAGNOSIS — F32.9 MAJOR DEPRESSIVE DISORDER, SINGLE EPISODE, UNSPECIFIED: ICD-10-CM

## 2021-07-14 DIAGNOSIS — K22.2 ESOPHAGEAL OBSTRUCTION: ICD-10-CM

## 2021-07-14 DIAGNOSIS — K59.00 CONSTIPATION, UNSPECIFIED: ICD-10-CM

## 2021-07-14 PROCEDURE — 99213 OFFICE O/P EST LOW 20 MIN: CPT | Performed by: INTERNAL MEDICINE

## 2021-07-14 RX ORDER — PLECANATIDE 3 MG/1
3 TABLET ORAL
Qty: 90 | Refills: 11 | Status: COMPLETED
Start: 2021-07-14 | End: 2021-09-09

## 2021-07-14 RX ORDER — NORTRIPTYLINE HYDROCHLORIDE 25 MG/1
25 CAPSULE ORAL
Qty: 90 | Refills: 11 | Status: COMPLETED
Start: 2021-07-14 | End: 2021-09-09

## 2021-07-14 RX ORDER — OMEPRAZOLE 40 MG/1
80 CAPSULE, DELAYED RELEASE ORAL
Qty: 180 | Refills: 3 | Status: ACTIVE
Start: 2020-06-01 | End: 2021-09-09

## 2021-08-17 ENCOUNTER — TRANSCRIBE ORDERS (OUTPATIENT)
Dept: ADMINISTRATIVE | Facility: HOSPITAL | Age: 66
End: 2021-08-17

## 2021-08-17 DIAGNOSIS — N63.0 LUMP OR MASS IN BREAST: Primary | ICD-10-CM

## 2021-08-31 ENCOUNTER — HOSPITAL ENCOUNTER (OUTPATIENT)
Dept: ULTRASOUND IMAGING | Facility: HOSPITAL | Age: 66
End: 2021-08-31

## 2021-09-09 ENCOUNTER — ON CAMPUS - OUTPATIENT (AMBULATORY)
Dept: URBAN - METROPOLITAN AREA HOSPITAL 2 | Facility: HOSPITAL | Age: 66
End: 2021-09-09

## 2021-09-09 VITALS
DIASTOLIC BLOOD PRESSURE: 51 MMHG | HEART RATE: 63 BPM | SYSTOLIC BLOOD PRESSURE: 120 MMHG | HEART RATE: 64 BPM | DIASTOLIC BLOOD PRESSURE: 95 MMHG | SYSTOLIC BLOOD PRESSURE: 136 MMHG | HEART RATE: 61 BPM | HEART RATE: 81 BPM | HEART RATE: 82 BPM | DIASTOLIC BLOOD PRESSURE: 80 MMHG | OXYGEN SATURATION: 100 % | HEART RATE: 74 BPM | OXYGEN SATURATION: 99 % | TEMPERATURE: 97.1 F | SYSTOLIC BLOOD PRESSURE: 161 MMHG | DIASTOLIC BLOOD PRESSURE: 66 MMHG | SYSTOLIC BLOOD PRESSURE: 147 MMHG | WEIGHT: 135 LBS | SYSTOLIC BLOOD PRESSURE: 122 MMHG | HEART RATE: 73 BPM | DIASTOLIC BLOOD PRESSURE: 61 MMHG | RESPIRATION RATE: 18 BRPM | SYSTOLIC BLOOD PRESSURE: 134 MMHG | OXYGEN SATURATION: 98 % | HEIGHT: 63 IN | DIASTOLIC BLOOD PRESSURE: 82 MMHG | RESPIRATION RATE: 16 BRPM | DIASTOLIC BLOOD PRESSURE: 64 MMHG | DIASTOLIC BLOOD PRESSURE: 50 MMHG | OXYGEN SATURATION: 95 % | SYSTOLIC BLOOD PRESSURE: 110 MMHG | DIASTOLIC BLOOD PRESSURE: 35 MMHG | SYSTOLIC BLOOD PRESSURE: 123 MMHG | SYSTOLIC BLOOD PRESSURE: 114 MMHG | HEART RATE: 68 BPM | HEART RATE: 66 BPM | DIASTOLIC BLOOD PRESSURE: 65 MMHG | SYSTOLIC BLOOD PRESSURE: 103 MMHG | HEART RATE: 62 BPM

## 2021-09-09 DIAGNOSIS — K64.1 SECOND DEGREE HEMORRHOIDS: ICD-10-CM

## 2021-09-09 DIAGNOSIS — K57.30 DIVERTICULOSIS OF LARGE INTESTINE WITHOUT PERFORATION OR ABS: ICD-10-CM

## 2021-09-09 DIAGNOSIS — K64.4 RESIDUAL HEMORRHOIDAL SKIN TAGS: ICD-10-CM

## 2021-09-09 DIAGNOSIS — R13.19 OTHER DYSPHAGIA: ICD-10-CM

## 2021-09-09 DIAGNOSIS — K44.9 DIAPHRAGMATIC HERNIA WITHOUT OBSTRUCTION OR GANGRENE: ICD-10-CM

## 2021-09-09 DIAGNOSIS — K62.5 HEMORRHAGE OF ANUS AND RECTUM: ICD-10-CM

## 2021-09-09 PROCEDURE — 43235 EGD DIAGNOSTIC BRUSH WASH: CPT | Performed by: INTERNAL MEDICINE

## 2021-09-09 PROCEDURE — 43450 DILATE ESOPHAGUS 1/MULT PASS: CPT | Performed by: INTERNAL MEDICINE

## 2021-09-09 PROCEDURE — 45398 COLONOSCOPY W/BAND LIGATION: CPT | Performed by: INTERNAL MEDICINE

## 2021-09-09 RX ORDER — TRAMADOL HYDROCHLORIDE 50 MG/1
200 TABLET ORAL
Qty: 40 | Refills: 0 | Status: ACTIVE
Start: 2021-09-09

## 2021-09-09 RX ORDER — OMEPRAZOLE 40 MG/1
80 CAPSULE, DELAYED RELEASE ORAL
Qty: 180 | Refills: 3 | Status: ACTIVE
Start: 2020-06-01 | End: 2021-09-09

## 2021-09-21 RX ORDER — METFORMIN HYDROCHLORIDE 500 MG/1
TABLET, EXTENDED RELEASE ORAL
Qty: 180 TABLET | Refills: 1 | OUTPATIENT
Start: 2021-09-21

## 2021-09-30 RX ORDER — METFORMIN HYDROCHLORIDE 500 MG/1
TABLET, EXTENDED RELEASE ORAL
Qty: 180 TABLET | Refills: 1 | OUTPATIENT
Start: 2021-09-30

## 2021-10-19 ENCOUNTER — HOSPITAL ENCOUNTER (OUTPATIENT)
Dept: MAMMOGRAPHY | Facility: HOSPITAL | Age: 66
Discharge: HOME | End: 2021-10-19

## 2021-10-19 ENCOUNTER — HOSPITAL ENCOUNTER (OUTPATIENT)
Dept: ULTRASOUND IMAGING | Facility: HOSPITAL | Age: 66
Discharge: HOME | End: 2021-10-19

## 2021-10-19 DIAGNOSIS — R92.8 ABNORMALITY OF BOTH BREASTS ON SCREENING MAMMOGRAM: ICD-10-CM

## 2021-10-19 PROCEDURE — G0279 TOMOSYNTHESIS, MAMMO: HCPCS

## 2021-10-19 PROCEDURE — 77066 DX MAMMO INCL CAD BI: CPT

## 2021-10-19 PROCEDURE — 76642 ULTRASOUND BREAST LIMITED: CPT

## 2022-02-17 ENCOUNTER — OFFICE (AMBULATORY)
Dept: URBAN - METROPOLITAN AREA CLINIC 64 | Facility: CLINIC | Age: 67
End: 2022-02-17

## 2022-02-17 VITALS
DIASTOLIC BLOOD PRESSURE: 103 MMHG | WEIGHT: 137 LBS | HEART RATE: 135 BPM | HEIGHT: 63 IN | SYSTOLIC BLOOD PRESSURE: 154 MMHG

## 2022-02-17 DIAGNOSIS — K62.5 HEMORRHAGE OF ANUS AND RECTUM: ICD-10-CM

## 2022-02-17 PROBLEM — R13.19 ESOPHAGEAL DYSPHAGIA: Status: ACTIVE | Noted: 2021-09-09

## 2022-02-17 PROCEDURE — 99213 OFFICE O/P EST LOW 20 MIN: CPT | Performed by: INTERNAL MEDICINE

## 2022-02-17 RX ORDER — OMEPRAZOLE 40 MG/1
80 CAPSULE, DELAYED RELEASE ORAL
Qty: 180 | Refills: 3 | Status: ACTIVE
Start: 2020-06-01 | End: 2021-09-09

## 2022-02-17 RX ORDER — HYDROCORTISONE 25 MG/G
OINTMENT TOPICAL
Qty: 30 | Refills: 6 | Status: COMPLETED
Start: 2022-02-17 | End: 2022-07-19

## 2022-03-25 RX ORDER — ATORVASTATIN CALCIUM 40 MG/1
40 TABLET, FILM COATED ORAL NIGHTLY
Qty: 90 TABLET | Refills: 1 | OUTPATIENT
Start: 2022-03-25

## 2022-03-28 ENCOUNTER — APPOINTMENT (OUTPATIENT)
Dept: GENERAL RADIOLOGY | Facility: HOSPITAL | Age: 67
End: 2022-03-28

## 2022-03-28 ENCOUNTER — APPOINTMENT (OUTPATIENT)
Dept: CT IMAGING | Facility: HOSPITAL | Age: 67
End: 2022-03-28

## 2022-03-28 ENCOUNTER — HOSPITAL ENCOUNTER (OUTPATIENT)
Facility: HOSPITAL | Age: 67
Setting detail: OBSERVATION
Discharge: HOME | End: 2022-03-29
Attending: EMERGENCY MEDICINE | Admitting: INTERNAL MEDICINE

## 2022-03-28 DIAGNOSIS — N39.0 URINARY TRACT INFECTION WITHOUT HEMATURIA, SITE UNSPECIFIED: ICD-10-CM

## 2022-03-28 DIAGNOSIS — R00.0 TACHYCARDIA: Primary | ICD-10-CM

## 2022-03-28 DIAGNOSIS — R55 NEAR SYNCOPE: ICD-10-CM

## 2022-03-28 LAB
ALBUMIN SERPL-MCNC: 3.9 G/DL (ref 3.5–5.2)
ALBUMIN/GLOB SERPL: 1.8 G/DL
ALP SERPL-CCNC: 66 U/L (ref 39–117)
ALT SERPL W P-5'-P-CCNC: 13 U/L (ref 1–33)
ANION GAP SERPL CALCULATED.3IONS-SCNC: 14 MMOL/L (ref 5–15)
APTT PPP: 25.7 SECONDS (ref 61–76.5)
AST SERPL-CCNC: 19 U/L (ref 1–32)
BACTERIA UR QL AUTO: ABNORMAL /HPF
BASOPHILS # BLD AUTO: 0 10*3/MM3 (ref 0–0.2)
BASOPHILS NFR BLD AUTO: 0.3 % (ref 0–1.5)
BILIRUB SERPL-MCNC: 0.5 MG/DL (ref 0–1.2)
BILIRUB UR QL STRIP: ABNORMAL
BUN SERPL-MCNC: 14 MG/DL (ref 8–23)
BUN/CREAT SERPL: 17.9 (ref 7–25)
CALCIUM SPEC-SCNC: 8.7 MG/DL (ref 8.6–10.5)
CHLORIDE SERPL-SCNC: 103 MMOL/L (ref 98–107)
CLARITY UR: ABNORMAL
CO2 SERPL-SCNC: 21 MMOL/L (ref 22–29)
COLOR UR: ABNORMAL
CREAT SERPL-MCNC: 0.78 MG/DL (ref 0.57–1)
DEPRECATED RDW RBC AUTO: 43.3 FL (ref 37–54)
EGFRCR SERPLBLD CKD-EPI 2021: 82.9 ML/MIN/1.73
EOSINOPHIL # BLD AUTO: 0 10*3/MM3 (ref 0–0.4)
EOSINOPHIL NFR BLD AUTO: 0.4 % (ref 0.3–6.2)
ERYTHROCYTE [DISTWIDTH] IN BLOOD BY AUTOMATED COUNT: 15.8 % (ref 12.3–15.4)
FLUAV SUBTYP SPEC NAA+PROBE: NOT DETECTED
FLUBV RNA ISLT QL NAA+PROBE: NOT DETECTED
GLOBULIN UR ELPH-MCNC: 2.2 GM/DL
GLUCOSE BLDC GLUCOMTR-MCNC: 102 MG/DL (ref 70–105)
GLUCOSE BLDC GLUCOMTR-MCNC: 165 MG/DL (ref 70–105)
GLUCOSE SERPL-MCNC: 188 MG/DL (ref 65–99)
GLUCOSE UR STRIP-MCNC: NEGATIVE MG/DL
HCT VFR BLD AUTO: 44.7 % (ref 34–46.6)
HGB BLD-MCNC: 15.2 G/DL (ref 12–15.9)
HGB UR QL STRIP.AUTO: NEGATIVE
HOLD SPECIMEN: NORMAL
HOLD SPECIMEN: NORMAL
HYALINE CASTS UR QL AUTO: ABNORMAL /LPF
INR PPP: 1.01 (ref 0.93–1.1)
KETONES UR QL STRIP: ABNORMAL
LEUKOCYTE ESTERASE UR QL STRIP.AUTO: ABNORMAL
LYMPHOCYTES # BLD AUTO: 1.3 10*3/MM3 (ref 0.7–3.1)
LYMPHOCYTES NFR BLD AUTO: 10.9 % (ref 19.6–45.3)
MAGNESIUM SERPL-MCNC: 1.9 MG/DL (ref 1.6–2.4)
MCH RBC QN AUTO: 26.9 PG (ref 26.6–33)
MCHC RBC AUTO-ENTMCNC: 34.1 G/DL (ref 31.5–35.7)
MCV RBC AUTO: 79 FL (ref 79–97)
MONOCYTES # BLD AUTO: 0.4 10*3/MM3 (ref 0.1–0.9)
MONOCYTES NFR BLD AUTO: 3.6 % (ref 5–12)
NEUTROPHILS NFR BLD AUTO: 84.8 % (ref 42.7–76)
NEUTROPHILS NFR BLD AUTO: 9.9 10*3/MM3 (ref 1.7–7)
NITRITE UR QL STRIP: POSITIVE
NRBC BLD AUTO-RTO: 0 /100 WBC (ref 0–0.2)
NT-PROBNP SERPL-MCNC: 72.5 PG/ML (ref 0–900)
PH UR STRIP.AUTO: <=5 [PH] (ref 5–8)
PLATELET # BLD AUTO: 229 10*3/MM3 (ref 140–450)
PMV BLD AUTO: 9.1 FL (ref 6–12)
POTASSIUM SERPL-SCNC: 3.9 MMOL/L (ref 3.5–5.2)
PROT SERPL-MCNC: 6.1 G/DL (ref 6–8.5)
PROT UR QL STRIP: ABNORMAL
PROTHROMBIN TIME: 11.2 SECONDS (ref 9.6–11.7)
RBC # BLD AUTO: 5.66 10*6/MM3 (ref 3.77–5.28)
RBC # UR STRIP: ABNORMAL /HPF
REF LAB TEST METHOD: ABNORMAL
SARS-COV-2 RNA PNL SPEC NAA+PROBE: NOT DETECTED
SODIUM SERPL-SCNC: 138 MMOL/L (ref 136–145)
SP GR UR STRIP: 1.02 (ref 1–1.03)
SQUAMOUS #/AREA URNS HPF: ABNORMAL /HPF
TROPONIN T SERPL-MCNC: <0.01 NG/ML (ref 0–0.03)
TSH SERPL DL<=0.05 MIU/L-ACNC: 1.08 UIU/ML (ref 0.27–4.2)
UROBILINOGEN UR QL STRIP: ABNORMAL
WBC # UR STRIP: ABNORMAL /HPF
WBC NRBC COR # BLD: 11.7 10*3/MM3 (ref 3.4–10.8)
WHOLE BLOOD HOLD SPECIMEN: NORMAL
WHOLE BLOOD HOLD SPECIMEN: NORMAL

## 2022-03-28 PROCEDURE — 87502 INFLUENZA DNA AMP PROBE: CPT | Performed by: PHYSICIAN ASSISTANT

## 2022-03-28 PROCEDURE — 82962 GLUCOSE BLOOD TEST: CPT

## 2022-03-28 PROCEDURE — 96375 TX/PRO/DX INJ NEW DRUG ADDON: CPT

## 2022-03-28 PROCEDURE — 36415 COLL VENOUS BLD VENIPUNCTURE: CPT | Performed by: INTERNAL MEDICINE

## 2022-03-28 PROCEDURE — G0378 HOSPITAL OBSERVATION PER HR: HCPCS

## 2022-03-28 PROCEDURE — 96365 THER/PROPH/DIAG IV INF INIT: CPT

## 2022-03-28 PROCEDURE — C9803 HOPD COVID-19 SPEC COLLECT: HCPCS

## 2022-03-28 PROCEDURE — 83880 ASSAY OF NATRIURETIC PEPTIDE: CPT | Performed by: PHYSICIAN ASSISTANT

## 2022-03-28 PROCEDURE — 93005 ELECTROCARDIOGRAM TRACING: CPT | Performed by: EMERGENCY MEDICINE

## 2022-03-28 PROCEDURE — 87635 SARS-COV-2 COVID-19 AMP PRB: CPT | Performed by: INTERNAL MEDICINE

## 2022-03-28 PROCEDURE — 93005 ELECTROCARDIOGRAM TRACING: CPT | Performed by: PHYSICIAN ASSISTANT

## 2022-03-28 PROCEDURE — 87086 URINE CULTURE/COLONY COUNT: CPT | Performed by: PHYSICIAN ASSISTANT

## 2022-03-28 PROCEDURE — 99220 PR INITIAL OBSERVATION CARE/DAY 70 MINUTES: CPT | Performed by: INTERNAL MEDICINE

## 2022-03-28 PROCEDURE — 93005 ELECTROCARDIOGRAM TRACING: CPT

## 2022-03-28 PROCEDURE — 81001 URINALYSIS AUTO W/SCOPE: CPT | Performed by: PHYSICIAN ASSISTANT

## 2022-03-28 PROCEDURE — 83735 ASSAY OF MAGNESIUM: CPT | Performed by: PHYSICIAN ASSISTANT

## 2022-03-28 PROCEDURE — 96361 HYDRATE IV INFUSION ADD-ON: CPT

## 2022-03-28 PROCEDURE — 63710000001 INSULIN LISPRO (HUMAN) PER 5 UNITS: Performed by: INTERNAL MEDICINE

## 2022-03-28 PROCEDURE — 70450 CT HEAD/BRAIN W/O DYE: CPT

## 2022-03-28 PROCEDURE — 84443 ASSAY THYROID STIM HORMONE: CPT | Performed by: PHYSICIAN ASSISTANT

## 2022-03-28 PROCEDURE — 85610 PROTHROMBIN TIME: CPT | Performed by: PHYSICIAN ASSISTANT

## 2022-03-28 PROCEDURE — 87186 SC STD MICRODIL/AGAR DIL: CPT | Performed by: PHYSICIAN ASSISTANT

## 2022-03-28 PROCEDURE — 99285 EMERGENCY DEPT VISIT HI MDM: CPT

## 2022-03-28 PROCEDURE — 84484 ASSAY OF TROPONIN QUANT: CPT | Performed by: INTERNAL MEDICINE

## 2022-03-28 PROCEDURE — 25010000002 CEFTRIAXONE PER 250 MG: Performed by: PHYSICIAN ASSISTANT

## 2022-03-28 PROCEDURE — 84484 ASSAY OF TROPONIN QUANT: CPT | Performed by: PHYSICIAN ASSISTANT

## 2022-03-28 PROCEDURE — 87077 CULTURE AEROBIC IDENTIFY: CPT | Performed by: PHYSICIAN ASSISTANT

## 2022-03-28 PROCEDURE — 96366 THER/PROPH/DIAG IV INF ADDON: CPT

## 2022-03-28 PROCEDURE — 80053 COMPREHEN METABOLIC PANEL: CPT | Performed by: PHYSICIAN ASSISTANT

## 2022-03-28 PROCEDURE — 85025 COMPLETE CBC W/AUTO DIFF WBC: CPT | Performed by: PHYSICIAN ASSISTANT

## 2022-03-28 PROCEDURE — 71045 X-RAY EXAM CHEST 1 VIEW: CPT

## 2022-03-28 PROCEDURE — 85730 THROMBOPLASTIN TIME PARTIAL: CPT | Performed by: PHYSICIAN ASSISTANT

## 2022-03-28 RX ORDER — INSULIN LISPRO 100 [IU]/ML
0-9 INJECTION, SOLUTION INTRAVENOUS; SUBCUTANEOUS AS NEEDED
Status: DISCONTINUED | OUTPATIENT
Start: 2022-03-28 | End: 2022-03-29 | Stop reason: HOSPADM

## 2022-03-28 RX ORDER — ONDANSETRON 4 MG/1
4 TABLET, FILM COATED ORAL EVERY 6 HOURS PRN
Status: DISCONTINUED | OUTPATIENT
Start: 2022-03-28 | End: 2022-03-29 | Stop reason: HOSPADM

## 2022-03-28 RX ORDER — ACETAMINOPHEN 160 MG/5ML
650 SOLUTION ORAL EVERY 4 HOURS PRN
Status: DISCONTINUED | OUTPATIENT
Start: 2022-03-28 | End: 2022-03-29 | Stop reason: HOSPADM

## 2022-03-28 RX ORDER — SODIUM CHLORIDE 9 MG/ML
100 INJECTION, SOLUTION INTRAVENOUS CONTINUOUS
Status: DISCONTINUED | OUTPATIENT
Start: 2022-03-28 | End: 2022-03-29 | Stop reason: HOSPADM

## 2022-03-28 RX ORDER — ATORVASTATIN CALCIUM 40 MG/1
40 TABLET, FILM COATED ORAL NIGHTLY
Status: DISCONTINUED | OUTPATIENT
Start: 2022-03-28 | End: 2022-03-29 | Stop reason: HOSPADM

## 2022-03-28 RX ORDER — SODIUM CHLORIDE 0.9 % (FLUSH) 0.9 %
10 SYRINGE (ML) INJECTION AS NEEDED
Status: DISCONTINUED | OUTPATIENT
Start: 2022-03-28 | End: 2022-03-29 | Stop reason: HOSPADM

## 2022-03-28 RX ORDER — ONDANSETRON 2 MG/ML
4 INJECTION INTRAMUSCULAR; INTRAVENOUS EVERY 6 HOURS PRN
Status: DISCONTINUED | OUTPATIENT
Start: 2022-03-28 | End: 2022-03-29 | Stop reason: HOSPADM

## 2022-03-28 RX ORDER — INSULIN LISPRO 100 [IU]/ML
0-9 INJECTION, SOLUTION INTRAVENOUS; SUBCUTANEOUS
Status: DISCONTINUED | OUTPATIENT
Start: 2022-03-28 | End: 2022-03-29 | Stop reason: HOSPADM

## 2022-03-28 RX ORDER — NITROGLYCERIN 0.4 MG/1
0.4 TABLET SUBLINGUAL
Status: DISCONTINUED | OUTPATIENT
Start: 2022-03-28 | End: 2022-03-29 | Stop reason: HOSPADM

## 2022-03-28 RX ORDER — ACETAMINOPHEN 650 MG/1
650 SUPPOSITORY RECTAL EVERY 4 HOURS PRN
Status: DISCONTINUED | OUTPATIENT
Start: 2022-03-28 | End: 2022-03-29 | Stop reason: HOSPADM

## 2022-03-28 RX ORDER — ACETAMINOPHEN 325 MG/1
650 TABLET ORAL EVERY 4 HOURS PRN
Status: DISCONTINUED | OUTPATIENT
Start: 2022-03-28 | End: 2022-03-29 | Stop reason: HOSPADM

## 2022-03-28 RX ORDER — PHENAZOPYRIDINE HYDROCHLORIDE 200 MG/1
200 TABLET, FILM COATED ORAL ONCE
Status: COMPLETED | OUTPATIENT
Start: 2022-03-28 | End: 2022-03-28

## 2022-03-28 RX ORDER — OLANZAPINE 10 MG/2ML
1 INJECTION, POWDER, LYOPHILIZED, FOR SOLUTION INTRAMUSCULAR
Status: DISCONTINUED | OUTPATIENT
Start: 2022-03-28 | End: 2022-03-29 | Stop reason: HOSPADM

## 2022-03-28 RX ORDER — SODIUM CHLORIDE 0.9 % (FLUSH) 0.9 %
10 SYRINGE (ML) INJECTION EVERY 12 HOURS SCHEDULED
Status: DISCONTINUED | OUTPATIENT
Start: 2022-03-28 | End: 2022-03-29 | Stop reason: HOSPADM

## 2022-03-28 RX ORDER — NICOTINE POLACRILEX 4 MG
15 LOZENGE BUCCAL
Status: DISCONTINUED | OUTPATIENT
Start: 2022-03-28 | End: 2022-03-29 | Stop reason: HOSPADM

## 2022-03-28 RX ORDER — DEXTROSE MONOHYDRATE 25 G/50ML
25 INJECTION, SOLUTION INTRAVENOUS
Status: DISCONTINUED | OUTPATIENT
Start: 2022-03-28 | End: 2022-03-29 | Stop reason: HOSPADM

## 2022-03-28 RX ADMIN — INSULIN LISPRO 2 UNITS: 100 INJECTION, SOLUTION INTRAVENOUS; SUBCUTANEOUS at 18:45

## 2022-03-28 RX ADMIN — PHENAZOPYRIDINE HYDROCHLORIDE 200 MG: 200 TABLET ORAL at 15:53

## 2022-03-28 RX ADMIN — SODIUM CHLORIDE 100 ML/HR: 9 INJECTION, SOLUTION INTRAVENOUS at 18:35

## 2022-03-28 RX ADMIN — Medication 10 ML: at 21:09

## 2022-03-28 RX ADMIN — CEFTRIAXONE 1 G: 10 INJECTION, POWDER, FOR SOLUTION INTRAVENOUS at 14:46

## 2022-03-28 RX ADMIN — SODIUM CHLORIDE 5 MG/HR: 900 INJECTION, SOLUTION INTRAVENOUS at 13:13

## 2022-03-28 RX ADMIN — ATORVASTATIN CALCIUM 40 MG: 40 TABLET, FILM COATED ORAL at 21:09

## 2022-03-29 ENCOUNTER — APPOINTMENT (OUTPATIENT)
Dept: RESPIRATORY THERAPY | Facility: HOSPITAL | Age: 67
End: 2022-03-29

## 2022-03-29 ENCOUNTER — APPOINTMENT (OUTPATIENT)
Dept: CARDIOLOGY | Facility: HOSPITAL | Age: 67
End: 2022-03-29

## 2022-03-29 VITALS
OXYGEN SATURATION: 99 % | HEIGHT: 64 IN | RESPIRATION RATE: 18 BRPM | TEMPERATURE: 97.5 F | HEART RATE: 74 BPM | DIASTOLIC BLOOD PRESSURE: 59 MMHG | WEIGHT: 138 LBS | SYSTOLIC BLOOD PRESSURE: 120 MMHG | BODY MASS INDEX: 23.56 KG/M2

## 2022-03-29 PROBLEM — I48.92 ATRIAL FLUTTER WITH RAPID VENTRICULAR RESPONSE (HCC): Status: ACTIVE | Noted: 2022-03-29

## 2022-03-29 LAB
ANION GAP SERPL CALCULATED.3IONS-SCNC: 10 MMOL/L (ref 5–15)
BASOPHILS # BLD AUTO: 0 10*3/MM3 (ref 0–0.2)
BASOPHILS NFR BLD AUTO: 0.8 % (ref 0–1.5)
BH CV ECHO MEAS - ACS: 2 CM
BH CV ECHO MEAS - AO MAX PG: 3.7 MMHG
BH CV ECHO MEAS - AO MEAN PG: 2.07 MMHG
BH CV ECHO MEAS - AO ROOT DIAM: 3.8 CM
BH CV ECHO MEAS - AO V2 MAX: 96.7 CM/SEC
BH CV ECHO MEAS - AO V2 VTI: 21.9 CM
BH CV ECHO MEAS - AVA(I,D): 3.5 CM2
BH CV ECHO MEAS - EDV(CUBED): 77.8 ML
BH CV ECHO MEAS - EDV(MOD-SP4): 73 ML
BH CV ECHO MEAS - EF(MOD-BP): 61 %
BH CV ECHO MEAS - EF(MOD-SP4): 61.1 %
BH CV ECHO MEAS - ESV(CUBED): 16.6 ML
BH CV ECHO MEAS - ESV(MOD-SP4): 28.4 ML
BH CV ECHO MEAS - FS: 40.2 %
BH CV ECHO MEAS - IVS/LVPW: 1.01 CM
BH CV ECHO MEAS - IVSD: 1.07 CM
BH CV ECHO MEAS - LA DIMENSION(2D): 2.5 CM
BH CV ECHO MEAS - LV DIASTOLIC VOL/BSA (35-75): 43.7 CM2
BH CV ECHO MEAS - LV MASS(C)D: 152.5 GRAMS
BH CV ECHO MEAS - LV MAX PG: 3.4 MMHG
BH CV ECHO MEAS - LV MEAN PG: 1.85 MMHG
BH CV ECHO MEAS - LV SYSTOLIC VOL/BSA (12-30): 17 CM2
BH CV ECHO MEAS - LV V1 MAX: 91.6 CM/SEC
BH CV ECHO MEAS - LV V1 VTI: 22.5 CM
BH CV ECHO MEAS - LVIDD: 4.3 CM
BH CV ECHO MEAS - LVIDS: 2.6 CM
BH CV ECHO MEAS - LVOT AREA: 3.4 CM2
BH CV ECHO MEAS - LVOT DIAM: 2.08 CM
BH CV ECHO MEAS - LVPWD: 1.05 CM
BH CV ECHO MEAS - MV A MAX VEL: 109.5 CM/SEC
BH CV ECHO MEAS - MV DEC SLOPE: 464.4 CM/SEC2
BH CV ECHO MEAS - MV DEC TIME: 0.19 MSEC
BH CV ECHO MEAS - MV E MAX VEL: 88 CM/SEC
BH CV ECHO MEAS - MV E/A: 0.8
BH CV ECHO MEAS - MV MAX PG: 5.7 MMHG
BH CV ECHO MEAS - MV MEAN PG: 2.12 MMHG
BH CV ECHO MEAS - MV V2 VTI: 28.7 CM
BH CV ECHO MEAS - MVA(VTI): 2.7 CM2
BH CV ECHO MEAS - PA V2 MAX: 67.7 CM/SEC
BH CV ECHO MEAS - PULM A REVS DUR: 0.1 SEC
BH CV ECHO MEAS - PULM A REVS VEL: 34 CM/SEC
BH CV ECHO MEAS - PULM DIAS VEL: 46.5 CM/SEC
BH CV ECHO MEAS - PULM SYS VEL: 64.4 CM/SEC
BH CV ECHO MEAS - RVDD: 2.3 CM
BH CV ECHO MEAS - SI(MOD-SP4): 26.7 ML/M2
BH CV ECHO MEAS - SV(LVOT): 76.5 ML
BH CV ECHO MEAS - SV(MOD-SP4): 44.6 ML
BUN SERPL-MCNC: 12 MG/DL (ref 8–23)
BUN/CREAT SERPL: 16.2 (ref 7–25)
CALCIUM SPEC-SCNC: 8.6 MG/DL (ref 8.6–10.5)
CHLORIDE SERPL-SCNC: 106 MMOL/L (ref 98–107)
CO2 SERPL-SCNC: 24 MMOL/L (ref 22–29)
CREAT SERPL-MCNC: 0.74 MG/DL (ref 0.57–1)
DEPRECATED RDW RBC AUTO: 42 FL (ref 37–54)
EGFRCR SERPLBLD CKD-EPI 2021: 88.3 ML/MIN/1.73
EOSINOPHIL # BLD AUTO: 0.1 10*3/MM3 (ref 0–0.4)
EOSINOPHIL NFR BLD AUTO: 2.4 % (ref 0.3–6.2)
ERYTHROCYTE [DISTWIDTH] IN BLOOD BY AUTOMATED COUNT: 15.4 % (ref 12.3–15.4)
GLUCOSE BLDC GLUCOMTR-MCNC: 100 MG/DL (ref 70–105)
GLUCOSE SERPL-MCNC: 99 MG/DL (ref 65–99)
HCT VFR BLD AUTO: 36.2 % (ref 34–46.6)
HGB BLD-MCNC: 12.5 G/DL (ref 12–15.9)
LYMPHOCYTES # BLD AUTO: 1.6 10*3/MM3 (ref 0.7–3.1)
LYMPHOCYTES NFR BLD AUTO: 31.2 % (ref 19.6–45.3)
MAGNESIUM SERPL-MCNC: 2 MG/DL (ref 1.6–2.4)
MAXIMAL PREDICTED HEART RATE: 152 BPM
MCH RBC QN AUTO: 27.2 PG (ref 26.6–33)
MCHC RBC AUTO-ENTMCNC: 34.5 G/DL (ref 31.5–35.7)
MCV RBC AUTO: 78.9 FL (ref 79–97)
MONOCYTES # BLD AUTO: 0.3 10*3/MM3 (ref 0.1–0.9)
MONOCYTES NFR BLD AUTO: 5.5 % (ref 5–12)
NEUTROPHILS NFR BLD AUTO: 3.1 10*3/MM3 (ref 1.7–7)
NEUTROPHILS NFR BLD AUTO: 60.1 % (ref 42.7–76)
NRBC BLD AUTO-RTO: 0 /100 WBC (ref 0–0.2)
PHOSPHATE SERPL-MCNC: 3.5 MG/DL (ref 2.5–4.5)
PLATELET # BLD AUTO: 165 10*3/MM3 (ref 140–450)
PMV BLD AUTO: 8.6 FL (ref 6–12)
POTASSIUM SERPL-SCNC: 4.4 MMOL/L (ref 3.5–5.2)
RBC # BLD AUTO: 4.58 10*6/MM3 (ref 3.77–5.28)
SODIUM SERPL-SCNC: 140 MMOL/L (ref 136–145)
STRESS TARGET HR: 129 BPM
WBC NRBC COR # BLD: 5.2 10*3/MM3 (ref 3.4–10.8)

## 2022-03-29 PROCEDURE — 82962 GLUCOSE BLOOD TEST: CPT

## 2022-03-29 PROCEDURE — 99204 OFFICE O/P NEW MOD 45 MIN: CPT | Performed by: INTERNAL MEDICINE

## 2022-03-29 PROCEDURE — 96361 HYDRATE IV INFUSION ADD-ON: CPT

## 2022-03-29 PROCEDURE — 85025 COMPLETE CBC W/AUTO DIFF WBC: CPT | Performed by: INTERNAL MEDICINE

## 2022-03-29 PROCEDURE — 83735 ASSAY OF MAGNESIUM: CPT | Performed by: INTERNAL MEDICINE

## 2022-03-29 PROCEDURE — 80048 BASIC METABOLIC PNL TOTAL CA: CPT | Performed by: INTERNAL MEDICINE

## 2022-03-29 PROCEDURE — G0378 HOSPITAL OBSERVATION PER HR: HCPCS

## 2022-03-29 PROCEDURE — 93306 TTE W/DOPPLER COMPLETE: CPT | Performed by: INTERNAL MEDICINE

## 2022-03-29 PROCEDURE — 84100 ASSAY OF PHOSPHORUS: CPT | Performed by: INTERNAL MEDICINE

## 2022-03-29 PROCEDURE — 93306 TTE W/DOPPLER COMPLETE: CPT

## 2022-03-29 PROCEDURE — 99217 PR OBSERVATION CARE DISCHARGE MANAGEMENT: CPT | Performed by: INTERNAL MEDICINE

## 2022-03-29 RX ORDER — ATENOLOL 25 MG/1
25 TABLET ORAL
Qty: 30 TABLET | Refills: 0 | Status: SHIPPED | OUTPATIENT
Start: 2022-03-30 | End: 2022-04-27

## 2022-03-29 RX ORDER — ATENOLOL 25 MG/1
25 TABLET ORAL
Status: DISCONTINUED | OUTPATIENT
Start: 2022-03-29 | End: 2022-03-29 | Stop reason: HOSPADM

## 2022-03-29 RX ORDER — TRAMADOL HYDROCHLORIDE 50 MG/1
50 TABLET ORAL EVERY 8 HOURS PRN
COMMUNITY

## 2022-03-29 RX ORDER — CEFDINIR 300 MG/1
300 CAPSULE ORAL 2 TIMES DAILY
Qty: 8 CAPSULE | Refills: 0 | Status: SHIPPED | OUTPATIENT
Start: 2022-03-29 | End: 2022-03-29 | Stop reason: SDUPTHER

## 2022-03-29 RX ORDER — ATENOLOL 25 MG/1
25 TABLET ORAL
Qty: 30 TABLET | Refills: 0 | Status: SHIPPED | OUTPATIENT
Start: 2022-03-30 | End: 2022-03-29 | Stop reason: SDUPTHER

## 2022-03-29 RX ORDER — CEFDINIR 300 MG/1
300 CAPSULE ORAL 2 TIMES DAILY
Qty: 8 CAPSULE | Refills: 0 | Status: SHIPPED | OUTPATIENT
Start: 2022-03-29 | End: 2022-04-02

## 2022-03-29 RX ORDER — DULOXETIN HYDROCHLORIDE 30 MG/1
60 CAPSULE, DELAYED RELEASE ORAL DAILY
Status: DISCONTINUED | OUTPATIENT
Start: 2022-03-29 | End: 2022-03-29 | Stop reason: HOSPADM

## 2022-03-29 RX ADMIN — SODIUM CHLORIDE 100 ML/HR: 9 INJECTION, SOLUTION INTRAVENOUS at 06:00

## 2022-03-29 RX ADMIN — APIXABAN 5 MG: 5 TABLET, FILM COATED ORAL at 09:21

## 2022-03-29 RX ADMIN — Medication 10 ML: at 09:21

## 2022-03-29 RX ADMIN — ATENOLOL 25 MG: 25 TABLET ORAL at 09:21

## 2022-03-29 RX ADMIN — DULOXETINE HYDROCHLORIDE 60 MG: 30 CAPSULE, DELAYED RELEASE ORAL at 09:21

## 2022-03-30 LAB — BACTERIA SPEC AEROBE CULT: ABNORMAL

## 2022-04-01 LAB
QT INTERVAL: 328 MS
QT INTERVAL: 413 MS

## 2022-04-07 ENCOUNTER — TELEPHONE (OUTPATIENT)
Dept: CARDIOLOGY | Facility: CLINIC | Age: 67
End: 2022-04-07

## 2022-04-07 NOTE — TELEPHONE ENCOUNTER
Pt stated that she has started the new meds atenolol 25mg  Dr. Pino started her on. She is still having flutters heart rate is 67 /79 she is having chest pressure but no pain. Pt is currently wearing an event monitor. She stated it isn't the same feeling she had before when she was in afib. I advsd Dr. Pino at hospitals and would be tomorrow to hear back but if gets worse to go to ER. She verbally understood and we discussed the importance of loggin her info on event monitor.

## 2022-04-08 NOTE — TELEPHONE ENCOUNTER
Spoke with patient, will try to get some readings from the monitor that she is wearing.  She does have an appointment with me in May.

## 2022-04-27 RX ORDER — ATENOLOL 25 MG/1
25 TABLET ORAL
Qty: 90 TABLET | Refills: 3 | Status: SHIPPED | OUTPATIENT
Start: 2022-04-27 | End: 2022-05-04

## 2022-04-27 RX ORDER — APIXABAN 5 MG/1
TABLET, FILM COATED ORAL
Qty: 60 TABLET | Refills: 0 | Status: SHIPPED | OUTPATIENT
Start: 2022-04-27 | End: 2022-05-23

## 2022-04-27 NOTE — TELEPHONE ENCOUNTER
Rx Refill Note  Requested Prescriptions     Pending Prescriptions Disp Refills   • atenolol (TENORMIN) 25 MG tablet [Pharmacy Med Name: ATENOLOL 25MG TABLETS] 90 tablet      Sig: TAKE 1 TABLET BY MOUTH DAILY   • Eliquis 5 MG tablet tablet [Pharmacy Med Name: ELIQUIS 5MG TABLETS] 60 tablet 0     Sig: TAKE 1 TABLET BY MOUTH EVERY 12 HOURS FOR ATRIAL FIBRILLATION      Last office visit with prescribing clinician: 3/29/2022      Next office visit with prescribing clinician: 5/10/2022            Ira Alberto MA  04/27/22, 13:18 EDT

## 2022-05-02 ENCOUNTER — TELEPHONE (OUTPATIENT)
Dept: CARDIOLOGY | Facility: CLINIC | Age: 67
End: 2022-05-02

## 2022-05-02 NOTE — TELEPHONE ENCOUNTER
Patient was put on a couple medications for Afib. Eliquis and she could not remember name of other medication. She is still having trouble with Afib. Still having it about 3/4 of everyday. Should she be concerned?

## 2022-05-04 LAB
Lab: 99
TOAL ENROLLMENT DAYS: 30

## 2022-05-04 PROCEDURE — 93228 REMOTE 30 DAY ECG REV/REPORT: CPT | Performed by: INTERNAL MEDICINE

## 2022-05-04 RX ORDER — ATENOLOL 25 MG/1
50 TABLET ORAL
Qty: 180 TABLET | Refills: 3
Start: 2022-05-04 | End: 2022-06-06 | Stop reason: SDUPTHER

## 2022-05-04 NOTE — TELEPHONE ENCOUNTER
Called pt and advsd that atenolol 25mg tablets to take 2 tablets daily. Confirmed a/u appt and she verbally understood instructions.

## 2022-05-10 ENCOUNTER — OFFICE VISIT (OUTPATIENT)
Dept: CARDIOLOGY | Facility: CLINIC | Age: 67
End: 2022-05-10

## 2022-05-10 VITALS
WEIGHT: 132.75 LBS | BODY MASS INDEX: 22.66 KG/M2 | HEIGHT: 64 IN | OXYGEN SATURATION: 98 % | DIASTOLIC BLOOD PRESSURE: 76 MMHG | HEART RATE: 55 BPM | SYSTOLIC BLOOD PRESSURE: 130 MMHG

## 2022-05-10 DIAGNOSIS — E78.00 PURE HYPERCHOLESTEROLEMIA: ICD-10-CM

## 2022-05-10 DIAGNOSIS — I47.1 SVT (SUPRAVENTRICULAR TACHYCARDIA): ICD-10-CM

## 2022-05-10 DIAGNOSIS — I48.92 ATRIAL FLUTTER WITH RAPID VENTRICULAR RESPONSE: Primary | ICD-10-CM

## 2022-05-10 PROCEDURE — 99214 OFFICE O/P EST MOD 30 MIN: CPT | Performed by: INTERNAL MEDICINE

## 2022-05-10 RX ORDER — PHENAZOPYRIDINE HYDROCHLORIDE 200 MG/1
200 TABLET, FILM COATED ORAL
COMMUNITY
Start: 2022-02-27 | End: 2022-05-10

## 2022-05-10 NOTE — PROGRESS NOTES
HP      Name: Emilee Gandara ADMIT: (Not on file)   : 1955  PCP: Ajay Tolbert NP-C    MRN: [id number] LOS: 0 days   AGE/SEX: 66 y.o. female  ROOM: Room/bed info not found     Chief Complaint   Patient presents with   • Consult     HOSPITAL F/U       Subjective        History of present illness  Emilee Gandara is a 66-year-old female patient who has diabetes, dyslipidemia, atrial tachycardia diagnosed in 2022, here today for follow-up.  Patient was admitted to the hospital with palpitations and EKG had shown atrial tachycardia versus atrial flutter with a heart rate of around 140, she was given IV Cardizem which converted to sinus rhythm.  Patient was eventually discharged with atenolol and Eliquis and a monitor was ordered and she is here today for follow-up.  She denies having any significant palpitations, no significant chest pain or shortness of breath no lower extremity edema no syncopal episodes.    Past Medical History:   Diagnosis Date   • Anxiety    • Depression    • Diabetes mellitus (HCC)    • Hyperlipidemia    • Lichen planus     MOUTH - CAUSES SORES, WEBBING AND INFLAMATION   • Tachycardia      Past Surgical History:   Procedure Laterality Date   • COLON SURGERY      PART OF COLON/REMOVED   • TOTAL ABDOMINAL HYSTERECTOMY       Family History   Problem Relation Age of Onset   • Atrial fibrillation Mother    • Heart disease Mother    • Hypertension Sister    • Breast cancer Paternal Aunt 54   • Ovarian cancer Maternal Grandmother 80   • Cancer Other    • Atrial fibrillation Daughter      Social History     Tobacco Use   • Smoking status: Never Smoker   • Smokeless tobacco: Never Used   Vaping Use   • Vaping Use: Never used   Substance Use Topics   • Alcohol use: No   • Drug use: No     (Not in a hospital admission)    Allergies:  Iodine, Prednisone, and Sulfa antibiotics    Review of systems    Constitutional: Negative.    Respiratory and cardiovascular: As detailed in HPI  section.  Gastrointestinal: Negative for constipation, nausea and vomiting negative for abdominal distention, abdominal pain and diarrhea.   Genitourinary: Negative for difficulty urinating and flank pain.   Musculoskeletal: Negative for arthralgias, joint swelling and myalgias.   Skin: Negative for color change, rash and wound.   Neurological: Negative for dizziness, syncope, weakness and headaches.   Hematological: Negative for adenopathy.   Psychiatric/Behavioral: Negative for confusion.   All other systems reviewed and are negative.    Physical Exam  VITALS REVIEWED    General:      well developed, in no acute distress.    Head:      normocephalic and atraumatic.    Eyes:      PERRL/EOM intact, conjunctiva and sclera clear with out nystagmus.    Neck:      no masses, thyromegaly,  trachea central with normal respiratory effort and PMI displaced laterally  Lungs:      Clear to auscultation bilaterally  Heart:       regular rate and rhythm  Msk:      no deformity or scoliosis noted of thoracic or lumbar spine.    Pulses:      pulses normal in all 4 extremities.    Extremities:       no lower extremity edema  Neurologic:      no focal deficits.   alert oriented x3  Skin:      intact without lesions or rashes.    Psych:      alert and cooperative; normal mood and affect; normal attention span and concentration.      Result Review :               Pertinent cardiac workup    1. EKG 3/28/2022 atrial tachycardia versus atrial flutter heart rate of 144 bpm.  2. Echo 3/29/2022 ejection fraction 60 to 65%  3. Event monitor 3/29/2022 for 25 days showed sinus rhythm, no A. fib or atrial flutter, short runs of ectopy noted.      Procedures        Assessment and Plan      Emilee Gandara is a 66-year-old female patient with no apparent coronary artery disease or other cardiac problems, presented to the hospital in March 2022 with what seem to be atrial tachycardia versus atrial flutter on EKG.  IV Cardizem was given and she  converted to sinus rhythm.  Patient remains on atenolol and Eliquis and a 25-day event monitor following discharge did not show any recurrence of the arrhythmia.  For now we will continue same therapy, I advised the patient to monitor her heart rate at home and preferably get a portable EKG monitor or an apple watch and I will see her in follow-up in 6 months or sooner if she has any issues with palpitations or other cardiac symptoms.    Diagnoses and all orders for this visit:    1. Atrial flutter with rapid ventricular response (HCC) (Primary)    2. SVT (supraventricular tachycardia) (HCC)  Overview:  Overview:   Demonstrated on Holter      3. Pure hypercholesterolemia           Return in about 6 months (around 11/10/2022).  Patient was given instructions and counseling regarding her condition or for health maintenance advice. Please see specific information pulled into the AVS if appropriate.

## 2022-05-23 RX ORDER — APIXABAN 5 MG/1
TABLET, FILM COATED ORAL
Qty: 60 TABLET | Refills: 6 | Status: SHIPPED | OUTPATIENT
Start: 2022-05-23 | End: 2022-05-24 | Stop reason: SDUPTHER

## 2022-05-23 NOTE — TELEPHONE ENCOUNTER
Rx Refill Note  Requested Prescriptions     Pending Prescriptions Disp Refills   • Eliquis 5 MG tablet tablet [Pharmacy Med Name: ELIQUIS 5MG TABLETS] 60 tablet 0     Sig: TAKE 1 TABLET BY MOUTH EVERY 12 HOURS FOR ATRIAL FIBRILLATION      Last office visit with prescribing clinician: 5/10/2022      Next office visit with prescribing clinician: 11/8/2022            Ira Alberto MA  05/23/22, 11:57 EDT

## 2022-05-24 ENCOUNTER — TELEPHONE (OUTPATIENT)
Dept: CARDIOLOGY | Facility: CLINIC | Age: 67
End: 2022-05-24

## 2022-05-26 NOTE — TELEPHONE ENCOUNTER
PATIENT CALLED BACK ABOUT HER SCRIPT. SAID SHE ONLY HAS A DAY LEFT. INSURANCE IS SAYING THEY CANNOT REFILL UNTIL 6-14-22, BUT HE UPPED HER DOSAGE SO SHE IS TAKING MORE.

## 2022-05-31 ENCOUNTER — TELEPHONE (OUTPATIENT)
Dept: CARDIOLOGY | Facility: CLINIC | Age: 67
End: 2022-05-31

## 2022-05-31 NOTE — TELEPHONE ENCOUNTER
Medication requested (name and dose): Eliquis 5 mg, twice daily    Pharmacy where request should be sent: [facility], [phone]    Additional details provided by patient:  SAID WILL BE COVERED IF WE USE [facility]     Best call back number: [phone]    Does the patient have less than a 3 day supply:  [] Yes  [x] No    Mary Seipel, RegSched Rep  05/31/22, 16:37 EDT

## 2022-06-06 ENCOUNTER — TELEPHONE (OUTPATIENT)
Dept: CARDIOLOGY | Facility: CLINIC | Age: 67
End: 2022-06-06

## 2022-06-06 RX ORDER — ATENOLOL 25 MG/1
50 TABLET ORAL
Qty: 180 TABLET | Refills: 3 | Status: SHIPPED | OUTPATIENT
Start: 2022-06-06 | End: 2022-09-08 | Stop reason: SDUPTHER

## 2022-06-06 NOTE — TELEPHONE ENCOUNTER
Incoming Refill Request      Medication requested (name and dose): atenolol, she does not know mg    Pharmacy where request should be sent: [facility]  [address]     Additional details provided by patient: #90     Best call back number: [phone]    Does the patient have less than a 3 day supply:  [] Yes  [x] No    Darnell Carbajal Rep  06/06/22, 15:01 EDT

## 2022-06-06 NOTE — TELEPHONE ENCOUNTER
Rx Refill Note  Requested Prescriptions      No prescriptions requested or ordered in this encounter      Last office visit with prescribing clinician: 5/10/2022      Next office visit with prescribing clinician: 11/8/2022            Beth Dan MA  06/06/22, 15:19 EDT

## 2022-06-14 ENCOUNTER — TELEPHONE (OUTPATIENT)
Dept: CARDIOLOGY | Facility: CLINIC | Age: 67
End: 2022-06-14

## 2022-06-14 NOTE — TELEPHONE ENCOUNTER
[facility]  Kenisha Duran NP  Cervical radiculopathy  Scheduled TBD  Phone# [phone]  Fax# [phone]        Placed on Dr. Pino Desernesto

## 2022-07-16 PROCEDURE — 96375 TX/PRO/DX INJ NEW DRUG ADDON: CPT

## 2022-07-16 PROCEDURE — 36415 COLL VENOUS BLD VENIPUNCTURE: CPT

## 2022-07-16 PROCEDURE — 96374 THER/PROPH/DIAG INJ IV PUSH: CPT

## 2022-07-16 PROCEDURE — 99283 EMERGENCY DEPT VISIT LOW MDM: CPT

## 2022-07-17 ENCOUNTER — HOSPITAL ENCOUNTER (EMERGENCY)
Facility: HOSPITAL | Age: 67
Discharge: HOME | End: 2022-07-17
Attending: EMERGENCY MEDICINE | Admitting: EMERGENCY MEDICINE

## 2022-07-17 ENCOUNTER — APPOINTMENT (OUTPATIENT)
Dept: CT IMAGING | Facility: HOSPITAL | Age: 67
End: 2022-07-17

## 2022-07-17 VITALS
RESPIRATION RATE: 20 BRPM | DIASTOLIC BLOOD PRESSURE: 75 MMHG | TEMPERATURE: 98.2 F | SYSTOLIC BLOOD PRESSURE: 160 MMHG | WEIGHT: 145 LBS | HEIGHT: 65 IN | OXYGEN SATURATION: 96 % | HEART RATE: 60 BPM | BODY MASS INDEX: 24.16 KG/M2

## 2022-07-17 DIAGNOSIS — R10.32 LEFT LOWER QUADRANT ABDOMINAL PAIN: Primary | ICD-10-CM

## 2022-07-17 DIAGNOSIS — R93.5 ABNORMAL CT OF THE ABDOMEN: ICD-10-CM

## 2022-07-17 LAB
ALBUMIN SERPL-MCNC: 4.2 G/DL (ref 3.5–5.2)
ALBUMIN/GLOB SERPL: 2.1 G/DL
ALP SERPL-CCNC: 94 U/L (ref 39–117)
ALT SERPL W P-5'-P-CCNC: 20 U/L (ref 1–33)
ANION GAP SERPL CALCULATED.3IONS-SCNC: 10 MMOL/L (ref 5–15)
AST SERPL-CCNC: 24 U/L (ref 1–32)
BASOPHILS # BLD AUTO: 0 10*3/MM3 (ref 0–0.2)
BASOPHILS NFR BLD AUTO: 0.5 % (ref 0–1.5)
BILIRUB SERPL-MCNC: 0.3 MG/DL (ref 0–1.2)
BILIRUB UR QL STRIP: NEGATIVE
BUN SERPL-MCNC: 14 MG/DL (ref 8–23)
BUN/CREAT SERPL: 22.6 (ref 7–25)
CALCIUM SPEC-SCNC: 9.3 MG/DL (ref 8.6–10.5)
CHLORIDE SERPL-SCNC: 102 MMOL/L (ref 98–107)
CLARITY UR: CLEAR
CO2 SERPL-SCNC: 26 MMOL/L (ref 22–29)
COLOR UR: YELLOW
CREAT SERPL-MCNC: 0.62 MG/DL (ref 0.57–1)
D-LACTATE SERPL-SCNC: 2 MMOL/L (ref 0.5–2)
DEPRECATED RDW RBC AUTO: 41.6 FL (ref 37–54)
EGFRCR SERPLBLD CKD-EPI 2021: 97.1 ML/MIN/1.73
EOSINOPHIL # BLD AUTO: 0.1 10*3/MM3 (ref 0–0.4)
EOSINOPHIL NFR BLD AUTO: 1.9 % (ref 0.3–6.2)
ERYTHROCYTE [DISTWIDTH] IN BLOOD BY AUTOMATED COUNT: 15 % (ref 12.3–15.4)
GLOBULIN UR ELPH-MCNC: 2 GM/DL
GLUCOSE SERPL-MCNC: 128 MG/DL (ref 65–99)
GLUCOSE UR STRIP-MCNC: NEGATIVE MG/DL
HCT VFR BLD AUTO: 35.1 % (ref 34–46.6)
HGB BLD-MCNC: 11.6 G/DL (ref 12–15.9)
HGB UR QL STRIP.AUTO: NEGATIVE
KETONES UR QL STRIP: NEGATIVE
LEUKOCYTE ESTERASE UR QL STRIP.AUTO: NEGATIVE
LIPASE SERPL-CCNC: 40 U/L (ref 13–60)
LYMPHOCYTES # BLD AUTO: 1.8 10*3/MM3 (ref 0.7–3.1)
LYMPHOCYTES NFR BLD AUTO: 39.6 % (ref 19.6–45.3)
MCH RBC QN AUTO: 26.1 PG (ref 26.6–33)
MCHC RBC AUTO-ENTMCNC: 33 G/DL (ref 31.5–35.7)
MCV RBC AUTO: 79.1 FL (ref 79–97)
MONOCYTES # BLD AUTO: 0.3 10*3/MM3 (ref 0.1–0.9)
MONOCYTES NFR BLD AUTO: 6.9 % (ref 5–12)
NEUTROPHILS NFR BLD AUTO: 2.4 10*3/MM3 (ref 1.7–7)
NEUTROPHILS NFR BLD AUTO: 51.1 % (ref 42.7–76)
NITRITE UR QL STRIP: NEGATIVE
NRBC BLD AUTO-RTO: 0.3 /100 WBC (ref 0–0.2)
PH UR STRIP.AUTO: 8 [PH] (ref 5–8)
PLATELET # BLD AUTO: 190 10*3/MM3 (ref 140–450)
PMV BLD AUTO: 8 FL (ref 6–12)
POTASSIUM SERPL-SCNC: 3.9 MMOL/L (ref 3.5–5.2)
PROT SERPL-MCNC: 6.2 G/DL (ref 6–8.5)
PROT UR QL STRIP: NEGATIVE
RBC # BLD AUTO: 4.43 10*6/MM3 (ref 3.77–5.28)
SODIUM SERPL-SCNC: 138 MMOL/L (ref 136–145)
SP GR UR STRIP: 1.01 (ref 1–1.03)
UROBILINOGEN UR QL STRIP: NORMAL
WBC NRBC COR # BLD: 4.7 10*3/MM3 (ref 3.4–10.8)

## 2022-07-17 PROCEDURE — 96374 THER/PROPH/DIAG INJ IV PUSH: CPT

## 2022-07-17 PROCEDURE — 96375 TX/PRO/DX INJ NEW DRUG ADDON: CPT

## 2022-07-17 PROCEDURE — 0 IOPAMIDOL PER 1 ML: Performed by: EMERGENCY MEDICINE

## 2022-07-17 PROCEDURE — 81003 URINALYSIS AUTO W/O SCOPE: CPT | Performed by: NURSE PRACTITIONER

## 2022-07-17 PROCEDURE — 74177 CT ABD & PELVIS W/CONTRAST: CPT

## 2022-07-17 PROCEDURE — 83605 ASSAY OF LACTIC ACID: CPT

## 2022-07-17 PROCEDURE — 25010000002 MORPHINE PER 10 MG: Performed by: NURSE PRACTITIONER

## 2022-07-17 PROCEDURE — 36415 COLL VENOUS BLD VENIPUNCTURE: CPT

## 2022-07-17 PROCEDURE — 25010000002 ONDANSETRON PER 1 MG: Performed by: NURSE PRACTITIONER

## 2022-07-17 PROCEDURE — 85025 COMPLETE CBC W/AUTO DIFF WBC: CPT | Performed by: NURSE PRACTITIONER

## 2022-07-17 PROCEDURE — 83690 ASSAY OF LIPASE: CPT | Performed by: NURSE PRACTITIONER

## 2022-07-17 PROCEDURE — 80053 COMPREHEN METABOLIC PANEL: CPT | Performed by: NURSE PRACTITIONER

## 2022-07-17 RX ORDER — SODIUM CHLORIDE 0.9 % (FLUSH) 0.9 %
10 SYRINGE (ML) INJECTION AS NEEDED
Status: DISCONTINUED | OUTPATIENT
Start: 2022-07-17 | End: 2022-07-17 | Stop reason: HOSPADM

## 2022-07-17 RX ORDER — ONDANSETRON 2 MG/ML
4 INJECTION INTRAMUSCULAR; INTRAVENOUS ONCE
Status: COMPLETED | OUTPATIENT
Start: 2022-07-17 | End: 2022-07-17

## 2022-07-17 RX ADMIN — ONDANSETRON 4 MG: 2 INJECTION INTRAMUSCULAR; INTRAVENOUS at 03:07

## 2022-07-17 RX ADMIN — IOPAMIDOL 100 ML: 755 INJECTION, SOLUTION INTRAVENOUS at 03:34

## 2022-07-17 RX ADMIN — MORPHINE SULFATE 4 MG: 4 INJECTION INTRAVENOUS at 03:08

## 2022-07-17 NOTE — ED PROVIDER NOTES
Subjective    Chief Complaint   Patient presents with   • Abdominal Pain     Pt reports L sided abdominal pain x1-2 wks.  Pt denies any N/V/D and states hx of diverticulitis.      Ajay Tolbert, NP-C  No LMP recorded. Patient has had a hysterectomy.  Allergies   Allergen Reactions   • Iodine Shortness Of Breath     Tolerates contrast   • Prednisone Dizziness   • Sulfa Antibiotics Rash       Patient is a 66-year-old female presents emergency department with complaint of abdominal pain.  Patient reports this began 1 to 2 weeks ago, she reports her pain began prior to her going on vacation, and she reports that her vacationing she continued to have worsening pain.  She reports she was in vacation in [place], was not comfortable being seen there.  He reports nausea.  No vomiting.  Reports diarrhea.  She does report she is had blood in the stool, but feels this is secondary to hemorrhoid.  She reports that she had a colonoscopy about 5 years ago.  Onset: 1 to 2 weeks ago  Location: Left lower quadrant  Duration: Consistent  Character: Sharp  Aggravating Factors: None  Alleviating Factors: None  Radiation: None  Treatments Tried: None          Review of Systems   Constitutional: Negative for chills and fever.   Respiratory: Negative for shortness of breath.    Cardiovascular: Negative for chest pain.   Gastrointestinal: Positive for abdominal pain, blood in stool (Patient reports she thinks this is due to hemorrhoid.), constipation, diarrhea and nausea. Negative for vomiting.   Genitourinary: Negative for dysuria.   Musculoskeletal: Negative for back pain and neck pain.   Skin: Negative for color change and rash.   Neurological: Negative for dizziness, syncope, weakness and light-headedness.       Past Medical History:   Diagnosis Date   • Anxiety    • Depression    • Diabetes mellitus (HCC)    • Hyperlipidemia    • Lichen planus     MOUTH - CAUSES SORES, WEBBING AND INFLAMATION   • Tachycardia        Allergies   Allergen  Reactions   • Iodine Shortness Of Breath     Tolerates contrast   • Prednisone Dizziness   • Sulfa Antibiotics Rash       Past Surgical History:   Procedure Laterality Date   • COLON SURGERY  2003    PART OF COLON/REMOVED   • TOTAL ABDOMINAL HYSTERECTOMY  1987       Family History   Problem Relation Age of Onset   • Atrial fibrillation Mother    • Heart disease Mother    • Hypertension Sister    • Breast cancer Paternal Aunt 54   • Ovarian cancer Maternal Grandmother 80   • Cancer Other    • Atrial fibrillation Daughter        Social History     Socioeconomic History   • Marital status:    Tobacco Use   • Smoking status: Never Smoker   • Smokeless tobacco: Never Used   Vaping Use   • Vaping Use: Never used   Substance and Sexual Activity   • Alcohol use: No   • Drug use: No   • Sexual activity: Defer           Objective   Physical Exam  Vitals and nursing note reviewed.   Constitutional:       General: She is not in acute distress.     Appearance: She is well-developed. She is not ill-appearing or toxic-appearing.   HENT:      Head: Normocephalic and atraumatic.      Mouth/Throat:      Mouth: Mucous membranes are moist.      Pharynx: Oropharynx is clear.   Eyes:      Extraocular Movements: Extraocular movements intact.      Pupils: Pupils are equal, round, and reactive to light.   Cardiovascular:      Rate and Rhythm: Normal rate and regular rhythm.      Heart sounds: No murmur heard.    No friction rub. No gallop.   Pulmonary:      Breath sounds: Normal breath sounds.   Abdominal:      General: Abdomen is protuberant. Bowel sounds are normal.      Palpations: Abdomen is soft.      Tenderness: There is abdominal tenderness in the left lower quadrant. There is no guarding or rebound. Negative signs include Rovsing's sign.   Skin:     General: Skin is warm and dry.      Capillary Refill: Capillary refill takes less than 2 seconds.   Neurological:      General: No focal deficit present.      Mental Status: She  "is alert and oriented to person, place, and time.         Procedures           ED Course  ED Course as of 07/17/22 0533   Sun Jul 17, 2022   0431 Hemoccult negative, chaperone with LOBO Burton [LB]      ED Course User Index  [LB] Veronica Franco, APRN                /75 (BP Location: Left arm, Patient Position: Sitting)   Pulse 60   Temp 98.2 °F (36.8 °C) (Oral)   Resp 20   Ht 165.1 cm (65\")   Wt 65.8 kg (145 lb)   SpO2 96%   BMI 24.13 kg/m²   Labs Reviewed   COMPREHENSIVE METABOLIC PANEL - Abnormal; Notable for the following components:       Result Value    Glucose 128 (*)     All other components within normal limits    Narrative:     GFR Normal >60  Chronic Kidney Disease <60  Kidney Failure <15     CBC WITH AUTO DIFFERENTIAL - Abnormal; Notable for the following components:    Hemoglobin 11.6 (*)     MCH 26.1 (*)     nRBC 0.3 (*)     All other components within normal limits   LIPASE - Normal   URINALYSIS W/ CULTURE IF INDICATED - Normal    Narrative:     In absence of clinical symptoms, the presence of pyuria, bacteria, and/or nitrites on the urinalysis result does not correlate with infection.  Urine microscopic not indicated.   POC LACTATE - Normal   POC LACTATE   CBC AND DIFFERENTIAL    Narrative:     The following orders were created for panel order CBC & Differential.  Procedure                               Abnormality         Status                     ---------                               -----------         ------                     CBC Auto Differential[[id number]]        Abnormal            Final result                 Please view results for these tests on the individual orders.     Medications   sodium chloride 0.9 % flush 10 mL (has no administration in time range)   morphine injection 4 mg (4 mg Intravenous Given 7/17/22 0308)   ondansetron (ZOFRAN) injection 4 mg (4 mg Intravenous Given 7/17/22 0307)   iopamidol (ISOVUE-370) 76 % injection 100 mL (100 mL Intravenous Given 7/17/22 " 0334)     CT Abdomen Pelvis With Contrast    Result Date: 7/17/2022   1.  Focal dilation of the main pancreatic duct in the head of pancreas, new since December 23, 2019 exam. Intraductal papillary mucinous neoplasm is a consideration. Recommend outpatient MRI abdomen without and with contrast plus MRCP (pancreas protocol). 2.  Moderate excess colonic stool. 3.  Chronic findings as above. Electronically signed by:  Waleska Solano M.D.  7/17/2022 2:09 AM                               MDM  Number of Diagnoses or Management Options     Amount and/or Complexity of Data Reviewed  Clinical lab tests: reviewed  Tests in the radiology section of CPT®: reviewed    Patient Progress  Patient progress: stable  Appropriate PPE was worn during the duration of the care for this patient while in the emergency department per [facility] Policy    --Discussed with ED attending Physician: Dr. Hammer  --Differentials: Diverticulitis, bowel obstruction, enteritis, UTI  This list is not all inclusive and does not constitute the entireity of considered causes.   --Patient was brought back to the emergency department room for evaluation and placed on appropriate monitoring.      Patient had IV established and blood work obtained  --ED Course /Labs/Imaging/Studies: Patient with above exam and work-up for left lower quadrant abdominal pain.  Patient's CT reveals focal dilatation of the main pancreatic duct in the head of the pancreas.  New since 2019.  But after discussing this with radiologist, he reviewed her previous films from 2021 and it was present at that time.  Recommend MRI abdomen without and with contrast plus MRCP pancreas protocol.  Chronic findings were reviewed.  Moderate excess colonic stool.  Patient's lab work is reassuring.  She did report blood in the stool, and her Hemoccult here was negative with no gross rectal bleeding.  Patient does see Dr. Scott with gastroenterology, she reports that she continuously has  problems with constipation versus diarrhea.  I have advised her of the need to follow-up regarding her abnormal CT, and that she could also begin this work-up with her primary care provider.  At this time I do not see any finding in the abdomen or pelvis requiring surgical intervention from the emergency department.  Given the colonic stool burden I advised use of magnesium citrate, and patient will be discharged home to follow-up with her primary care provider.    --Disposition: I spoke with the patient at the bedside regarding their plan of care, discharge instruction, home care, prescriptions, indications to return to the emergency department, and importance follow-up.  We discussed test results at the bedside, including incidental abnormal labs, radiological findings, understands need for follow-up with primary care or specialist if indicated.     Pt is aware that discharge does not mean that nothing is wrong but it indicates no emergency is present and they must continue care with follow-up as given below or physician of their choice           This document is intended for medical expert use only. Reading of this document by patients and/or patient's family without participating medical staff guidance may result in misinterpretation and unintended morbidity.  Any interpretation of such data is the responsibility of the patient and/or family member responsible for the patient in concert with their primary or specialist providers, not to be left for sources of online searches such as [employer], [employer] or similar queries. Relying on these approaches to knowledge may result in misinterpretation, misguided goals of care and even death should patients or family members try recommendations outside of the realm of professional medical care in a supervised inpatient environment.                                          Final diagnoses:   Left lower quadrant abdominal pain   Abnormal CT of the abdomen       ED  Disposition  ED Disposition     ED Disposition   Discharge    Condition   Stable    Comment   --             [facility] EMERGENCY DEPARTMENT  [address] [address] [phone]    As needed, If symptoms worsen    Ajay Tolbert, SARITA  [address]  [place] [id number] [phone]    Schedule an appointment as soon as possible for a visit       George Scott MD  [address] [facility] [place] [address] [phone]    Schedule an appointment as soon as possible for a visit   for follow up on abnormal ct scan and for further evaluation    PATIENT CONNECTION - [facility] [id number] [phone]  Schedule an appointment as soon as possible for a visit   Call for assistance with follow up with Primary care provider-call tomorrow.         Medication List      No changes were made to your prescriptions during this visit.          Veronica Franco, APRN  07/17/22 0533

## 2022-07-17 NOTE — DISCHARGE INSTRUCTIONS
Please follow-up with your primary care provider, if you not have a primary care provider please utilize patient connection above to establish care  Return to the ED for new or worsening symptoms  Follow up with Specialist if indicated above-call for an appointment-gastroenterology  Please follow-up with your gastroenterologist regarding further evaluation of your pancreas, it is recommended you have an outpatient MRI of the abdomen with and without contrast plus MRCP pancreas protocol.  Primary care provider could also initiate this work-up.  This follow-up is very important

## 2022-07-19 ENCOUNTER — OFFICE (AMBULATORY)
Dept: URBAN - METROPOLITAN AREA CLINIC 64 | Facility: CLINIC | Age: 67
End: 2022-07-19

## 2022-07-19 VITALS
HEART RATE: 72 BPM | DIASTOLIC BLOOD PRESSURE: 60 MMHG | SYSTOLIC BLOOD PRESSURE: 130 MMHG | WEIGHT: 129 LBS | HEIGHT: 63 IN

## 2022-07-19 DIAGNOSIS — K21.9 GASTRO-ESOPHAGEAL REFLUX DISEASE WITHOUT ESOPHAGITIS: ICD-10-CM

## 2022-07-19 DIAGNOSIS — R10.84 GENERALIZED ABDOMINAL PAIN: ICD-10-CM

## 2022-07-19 DIAGNOSIS — K59.00 CONSTIPATION, UNSPECIFIED: ICD-10-CM

## 2022-07-19 PROCEDURE — 99214 OFFICE O/P EST MOD 30 MIN: CPT | Performed by: NURSE PRACTITIONER

## 2022-07-19 RX ORDER — DICYCLOMINE HYDROCHLORIDE 10 MG/1
CAPSULE ORAL
Qty: 240 | Refills: 11 | Status: COMPLETED
Start: 2022-07-19 | End: 2023-01-10

## 2022-07-20 ENCOUNTER — TRANSCRIBE ORDERS (OUTPATIENT)
Dept: ADMINISTRATIVE | Facility: HOSPITAL | Age: 67
End: 2022-07-20

## 2022-07-20 DIAGNOSIS — R93.3 ABNORMAL FINDINGS ON DIAGNOSTIC IMAGING OF DIGESTIVE SYSTEM: Primary | ICD-10-CM

## 2022-07-27 PROCEDURE — 87186 SC STD MICRODIL/AGAR DIL: CPT | Performed by: FAMILY MEDICINE

## 2022-07-27 PROCEDURE — 87086 URINE CULTURE/COLONY COUNT: CPT | Performed by: FAMILY MEDICINE

## 2022-07-27 PROCEDURE — 87077 CULTURE AEROBIC IDENTIFY: CPT | Performed by: FAMILY MEDICINE

## 2022-08-11 ENCOUNTER — APPOINTMENT (OUTPATIENT)
Dept: MRI IMAGING | Facility: HOSPITAL | Age: 67
End: 2022-08-11

## 2022-08-18 ENCOUNTER — TELEPHONE (OUTPATIENT)
Dept: CARDIOLOGY | Facility: CLINIC | Age: 67
End: 2022-08-18

## 2022-08-18 ENCOUNTER — OFFICE (AMBULATORY)
Dept: URBAN - METROPOLITAN AREA CLINIC 64 | Facility: CLINIC | Age: 67
End: 2022-08-18

## 2022-08-18 VITALS
HEIGHT: 63 IN | DIASTOLIC BLOOD PRESSURE: 83 MMHG | WEIGHT: 130 LBS | HEART RATE: 60 BPM | SYSTOLIC BLOOD PRESSURE: 159 MMHG

## 2022-08-18 DIAGNOSIS — K59.00 CONSTIPATION, UNSPECIFIED: ICD-10-CM

## 2022-08-18 DIAGNOSIS — R10.9 UNSPECIFIED ABDOMINAL PAIN: ICD-10-CM

## 2022-08-18 PROBLEM — K64.1 SECOND DEGREE HEMORRHOIDS: Status: ACTIVE | Noted: 2019-03-01

## 2022-08-18 PROCEDURE — 99214 OFFICE O/P EST MOD 30 MIN: CPT | Performed by: NURSE PRACTITIONER

## 2022-08-18 RX ORDER — LINACLOTIDE 290 UG/1
290 CAPSULE, GELATIN COATED ORAL
Qty: 90 | Refills: 3 | Status: COMPLETED
Start: 2022-08-18 | End: 2023-01-10

## 2022-08-18 RX ORDER — HYDROCORTISONE 25 MG/G
OINTMENT TOPICAL
Qty: 20 | Refills: 6 | Status: COMPLETED
Start: 2022-08-18 | End: 2023-01-10

## 2022-08-18 NOTE — TELEPHONE ENCOUNTER
QUAN SAVAGE NP  EUS (ENDOSCOPIC ULTRASOUND)  SURGERY 9/27/2022  PHONE [phone]  [phone]    PLACED ON DR. SALINAS DESK

## 2022-09-08 RX ORDER — ATENOLOL 25 MG/1
50 TABLET ORAL
Qty: 180 TABLET | Refills: 3 | Status: SHIPPED | OUTPATIENT
Start: 2022-09-08 | End: 2022-09-28 | Stop reason: SDUPTHER

## 2022-09-08 NOTE — TELEPHONE ENCOUNTER
Rx Refill Note  Requested Prescriptions     Pending Prescriptions Disp Refills   • atenolol (TENORMIN) 25 MG tablet 180 tablet 3     Sig: Take 2 tablets by mouth Daily.      Last office visit with prescribing clinician: 5/10/2022      Next office visit with prescribing clinician: 11/8/2022            ANNIE CISNEROS MA  09/08/22, 10:40 EDT

## 2022-09-27 ENCOUNTER — ON CAMPUS - OUTPATIENT (AMBULATORY)
Dept: URBAN - METROPOLITAN AREA HOSPITAL 77 | Facility: HOSPITAL | Age: 67
End: 2022-09-27

## 2022-09-27 DIAGNOSIS — K86.89 OTHER SPECIFIED DISEASES OF PANCREAS: ICD-10-CM

## 2022-09-27 DIAGNOSIS — R93.3 ABNORMAL FINDINGS ON DIAGNOSTIC IMAGING OF OTHER PARTS OF DI: ICD-10-CM

## 2022-09-27 PROCEDURE — 43259 EGD US EXAM DUODENUM/JEJUNUM: CPT | Performed by: INTERNAL MEDICINE

## 2022-09-28 RX ORDER — ATENOLOL 25 MG/1
50 TABLET ORAL
Qty: 180 TABLET | Refills: 3 | Status: SHIPPED | OUTPATIENT
Start: 2022-09-28

## 2022-09-28 NOTE — TELEPHONE ENCOUNTER
Rx Refill Note  Requested Prescriptions      No prescriptions requested or ordered in this encounter      Last office visit with prescribing clinician: 5/10/2022      Next office visit with prescribing clinician: 11/8/2022            Ira Alberto MA  09/28/22, 15:28 EDT

## 2022-10-17 ENCOUNTER — OFFICE VISIT (OUTPATIENT)
Dept: ORTHOPEDIC SURGERY | Facility: CLINIC | Age: 67
End: 2022-10-17

## 2022-10-17 VITALS — WEIGHT: 135 LBS | HEIGHT: 55 IN | BODY MASS INDEX: 31.24 KG/M2 | HEART RATE: 65 BPM

## 2022-10-17 DIAGNOSIS — M25.511 ACUTE PAIN OF RIGHT SHOULDER: Primary | ICD-10-CM

## 2022-10-17 DIAGNOSIS — M75.121 COMPLETE TEAR OF RIGHT ROTATOR CUFF, UNSPECIFIED WHETHER TRAUMATIC: ICD-10-CM

## 2022-10-17 PROCEDURE — 99203 OFFICE O/P NEW LOW 30 MIN: CPT | Performed by: ORTHOPAEDIC SURGERY

## 2022-10-17 RX ORDER — PHENAZOPYRIDINE HYDROCHLORIDE 200 MG/1
200 TABLET, FILM COATED ORAL 3 TIMES DAILY PRN
COMMUNITY
Start: 2022-08-25

## 2022-10-17 RX ORDER — ESCITALOPRAM OXALATE 10 MG/1
10 TABLET ORAL DAILY
COMMUNITY
Start: 2022-08-25

## 2022-10-17 RX ORDER — FERROUS SULFATE 325(65) MG
325 TABLET ORAL
COMMUNITY
End: 2023-02-16

## 2022-10-17 NOTE — PROGRESS NOTES
"     Patient ID: Emilee Gandara is a 67 y.o. female.    Chief Complaint:    Chief Complaint   Patient presents with   • Right Shoulder - Initial Evaluation, Pain     Pain 6-7       HPI:  This is a 67-year-old female here with longstanding right shoulder pain.  She has had treatment with periodic injections with diminishing returns most recently about 2 to 3 months ago.  Pain is mostly deep in the shoulder worse with her activity and at night  Past Medical History:   Diagnosis Date   • Anxiety    • Depression    • Diabetes mellitus (HCC)    • Hyperlipidemia    • Lichen planus     MOUTH - CAUSES SORES, WEBBING AND INFLAMATION   • Tachycardia        Past Surgical History:   Procedure Laterality Date   • COLON SURGERY  2003    PART OF COLON/REMOVED   • TOTAL ABDOMINAL HYSTERECTOMY  1987       Family History   Problem Relation Age of Onset   • Atrial fibrillation Mother    • Heart disease Mother    • Hypertension Sister    • Breast cancer Paternal Aunt 54   • Ovarian cancer Maternal Grandmother 80   • Cancer Other    • Atrial fibrillation Daughter           Social History     Occupational History   • Not on file   Tobacco Use   • Smoking status: Never   • Smokeless tobacco: Never   Vaping Use   • Vaping Use: Never used   Substance and Sexual Activity   • Alcohol use: No   • Drug use: No   • Sexual activity: Defer      Review of Systems   Cardiovascular: Negative for chest pain.   Musculoskeletal: Positive for arthralgias.       Objective:    Pulse 65   Ht 63 cm (24.8\")   Wt 61.2 kg (135 lb)   .29 kg/m²     Physical Examination:  Right shoulder demonstrates intact skin mild pain in the impingement area passive elevation 170 abduction 140 external rotation 50 internal rotation L5 with mild pain and weakness on Speed, [place], supraspinatus testing.  Belly press and liftoff are 5/5 and 4/5.  Sensory and motor exam are intact all distributions. Radial pulse is palpable and capillary refill is less than two " seconds to all digits.    Imaging:  right Shoulder X-Ray  Indication: Shoulder pain no trauma  AP Y and Lateral views  Findings: Well-maintained joint spaces no fracture  no bony lesion  Soft tissues normal  normal joint spaces  Hardware appropriately positioned not applicable      no prior studies available for comparison    MRI reviewed my trepidation is widespread cuff tearing of the supraspinatus and infraspinatus 50 to 60% depth    Assessment:  Right shoulder partial cuff tear    Plan:  Further treatment options were discussed.  We will attempt a trial of physical therapy.  If this does not improve can consider shoulder arthroscopy and cuff repair      Procedures         Disclaimer: Part of this note may be an electronic transcription/translation of spoken language to printed text using the Dragon Dictation System

## 2022-10-18 ENCOUNTER — PATIENT ROUNDING (BHMG ONLY) (OUTPATIENT)
Dept: ORTHOPEDIC SURGERY | Facility: CLINIC | Age: 67
End: 2022-10-18

## 2022-10-18 NOTE — PROGRESS NOTES
A my chart message has been sent to the patient for PATIENT ROUNDING with [facility]

## 2022-11-08 ENCOUNTER — OFFICE VISIT (OUTPATIENT)
Dept: CARDIOLOGY | Facility: CLINIC | Age: 67
End: 2022-11-08

## 2022-11-08 VITALS
HEIGHT: 64 IN | SYSTOLIC BLOOD PRESSURE: 128 MMHG | DIASTOLIC BLOOD PRESSURE: 73 MMHG | BODY MASS INDEX: 23.22 KG/M2 | HEART RATE: 61 BPM | OXYGEN SATURATION: 92 % | WEIGHT: 136 LBS

## 2022-11-08 DIAGNOSIS — I47.1 SVT (SUPRAVENTRICULAR TACHYCARDIA): Primary | ICD-10-CM

## 2022-11-08 DIAGNOSIS — E78.2 MIXED HYPERLIPIDEMIA: ICD-10-CM

## 2022-11-08 PROBLEM — I48.92 ATRIAL FLUTTER WITH RAPID VENTRICULAR RESPONSE: Status: RESOLVED | Noted: 2022-03-29 | Resolved: 2022-11-08

## 2022-11-08 PROCEDURE — 99213 OFFICE O/P EST LOW 20 MIN: CPT | Performed by: INTERNAL MEDICINE

## 2022-11-08 NOTE — PROGRESS NOTES
Progress note      Name: Emilee Gandara ADMIT: (Not on file)   : 1955  PCP: Ajay Tolbert NP-C    MRN: [id number] LOS: 0 days   AGE/SEX: 67 y.o. female  ROOM: Room/bed info not found     Chief Complaint   Patient presents with   • Atrial Fibrillation     6 month Follow up       Subjective       History of present illness  Emilee Gandara  is a 67-year-old female patient who has diabetes, dyslipidemia, atrial tachycardia diagnosed in 2022, here today for follow-up.  Patient was admitted to the hospital with palpitations and EKG had shown atrial tachycardia with a heart rate of around 140, she was given IV Cardizem which converted to sinus rhythm.  Patient was eventually discharged with atenolol and Eliquis and a monitor was ordered and she is here today for follow-up.  She denies having any significant palpitations, no significant chest pain, no lower extremity edema, no syncopal episodes, she does complain of shortness of breath but denies any orthopnea.    Past Medical History:   Diagnosis Date   • Anxiety    • Depression    • Diabetes mellitus (HCC)    • Hyperlipidemia    • Lichen planus     MOUTH - CAUSES SORES, WEBBING AND INFLAMATION   • Tachycardia      Past Surgical History:   Procedure Laterality Date   • COLON SURGERY      PART OF COLON/REMOVED   • TOTAL ABDOMINAL HYSTERECTOMY       Family History   Problem Relation Age of Onset   • Atrial fibrillation Mother    • Heart disease Mother    • Hypertension Sister    • Breast cancer Paternal Aunt 54   • Ovarian cancer Maternal Grandmother 80   • Cancer Other    • Atrial fibrillation Daughter      Social History     Tobacco Use   • Smoking status: Never   • Smokeless tobacco: Never   Vaping Use   • Vaping Use: Never used   Substance Use Topics   • Alcohol use: No   • Drug use: No     (Not in a hospital admission)    Allergies:  Prednisone and Sulfa antibiotics      Physical Exam  VITALS REVIEWED    General:      well developed, in no  acute distress.    Head:      normocephalic and atraumatic.    Eyes:      PERRL/EOM intact, conjunctiva and sclera clear with out nystagmus.    Neck:      no masses, thyromegaly,  trachea central with normal respiratory effort and PMI displaced laterally  Lungs:      Clear to auscultation bilaterally  Heart:       Regular rate and rhythm  Msk:      no deformity or scoliosis noted of thoracic or lumbar spine.    Pulses:      pulses normal in all 4 extremities.    Extremities:       No lower extremity edema  Neurologic:      no focal deficits.   alert oriented x3  Skin:      intact without lesions or rashes.    Psych:      alert and cooperative; normal mood and affect; normal attention span and concentration.      Result Review :               Pertinent cardiac workup    1. EKG 3/28/2022 atrial tachycardia heart rate of 144 bpm.  2. Echo 3/29/2022 ejection fraction 60 to 65%  3. Event monitor 3/29/2022 for 25 days showed sinus rhythm, no A. fib or atrial flutter, short runs of ectopy noted.      Procedures        Assessment and Plan      Emilee Gandara is a 67-year-old female patient with no apparent history of coronary artery disease, presented to the hospital in March 2022 with atrial tachycardia.  She was given IV Cardizem which converted her to sinus rhythm.  Patient was discharged on atenolol and also Eliquis.  A follow-up 25-day Holter monitor did not show any recurrence of arrhythmia.  I think that Eliquis can be discontinued because there is no evidence of A. fib.  Her rhythm today is sinus.  Patient is experiencing some shortness of breath but there are no other symptoms suggestive of angina or CHF.  Her blood pressure is well controlled.  We will see her in follow-up in 6 months.    Diagnoses and all orders for this visit:    1. SVT (supraventricular tachycardia) (HCC) (Primary)  Overview:  Overview:   Demonstrated on Holter      2. Mixed hyperlipidemia           Return in about 6 months (around  5/8/2023).  Patient was given instructions and counseling regarding her condition or for health maintenance advice. Please see specific information pulled into the AVS if appropriate.

## 2022-12-01 ENCOUNTER — TELEPHONE (OUTPATIENT)
Dept: CARDIOLOGY | Facility: CLINIC | Age: 67
End: 2022-12-01

## 2022-12-01 NOTE — TELEPHONE ENCOUNTER
Spoke with patient regarding symptoms. May be related to sinus congestion. Instructed patient to stay hydrated and feel pulse if episodes keep occurring. Education provided on atrial fibrillation rate/rhythm. Will monitor for now. Patient to call back if symptoms continue to occur or feels she is in atrial fibrillation. Patient v/u.

## 2022-12-01 NOTE — TELEPHONE ENCOUNTER
Caller: Emilee Gandara     Relationship: [unfilled]     Best call back number: [phone]    What is your medical concern? PATIENT HAVING DIZZY SPELLS. HAD ONE YESTERDAY AND ONE THIS MORNING.     How long has this issue been going on? JUST STARTED YESTERDAY.     Is your provider already aware of this issue? ONLY HER FIRST ONE WHICH WAS MONTHS AGO BUT HE IS NOT AWARE OF HER RECENT ONES.     Have you been treated for this issue? YES BUT IS UNAWARE OF THE NAME OF THE MEDICATION.

## 2022-12-13 PROCEDURE — 87077 CULTURE AEROBIC IDENTIFY: CPT | Performed by: PHYSICIAN ASSISTANT

## 2022-12-13 PROCEDURE — 87186 SC STD MICRODIL/AGAR DIL: CPT | Performed by: PHYSICIAN ASSISTANT

## 2022-12-13 PROCEDURE — 87086 URINE CULTURE/COLONY COUNT: CPT | Performed by: PHYSICIAN ASSISTANT

## 2023-01-10 ENCOUNTER — OFFICE (AMBULATORY)
Dept: URBAN - METROPOLITAN AREA CLINIC 64 | Facility: CLINIC | Age: 68
End: 2023-01-10

## 2023-01-10 VITALS
HEIGHT: 63 IN | HEART RATE: 63 BPM | WEIGHT: 135 LBS | SYSTOLIC BLOOD PRESSURE: 137 MMHG | DIASTOLIC BLOOD PRESSURE: 77 MMHG

## 2023-01-10 DIAGNOSIS — Q45.3 OTHER CONGENITAL MALFORMATIONS OF PANCREAS AND PANCREATIC DU: ICD-10-CM

## 2023-01-10 DIAGNOSIS — R10.12 LEFT UPPER QUADRANT PAIN: ICD-10-CM

## 2023-01-10 DIAGNOSIS — K59.00 CONSTIPATION, UNSPECIFIED: ICD-10-CM

## 2023-01-10 DIAGNOSIS — K62.5 HEMORRHAGE OF ANUS AND RECTUM: ICD-10-CM

## 2023-01-10 PROCEDURE — 99214 OFFICE O/P EST MOD 30 MIN: CPT | Performed by: INTERNAL MEDICINE

## 2023-01-16 ENCOUNTER — ANESTHESIA EVENT (OUTPATIENT)
Dept: GASTROENTEROLOGY | Facility: HOSPITAL | Age: 68
End: 2023-01-16
Payer: MEDICARE

## 2023-01-16 NOTE — ANESTHESIA PREPROCEDURE EVALUATION
Anesthesia Evaluation     Patient summary reviewed and Nursing notes reviewed   no history of anesthetic complications:  NPO Solid Status: > 8 hours  NPO Liquid Status: > 8 hours           Airway   Dental      Pulmonary    Cardiovascular     ECG reviewed  Patient on routine beta blocker    (+) hypertension, dysrhythmias Tachycardia, hyperlipidemia,  carotid artery disease      Neuro/Psych  (+) numbness, psychiatric history Anxiety and Depression,    GI/Hepatic/Renal/Endo    (+)  GI bleeding , diabetes mellitus, thyroid problem hypothyroidism    Musculoskeletal     (+) myalgias, radiculopathy  Abdominal    Substance History      OB/GYN          Other   arthritis,      ROS/Med Hx Other: SVT, allergies, fibromyositis, fibromyalgia, abd adhesions, abd pain, pancreatic duct abnormality    PSH  TOTAL ABDOMINAL HYSTERECTOMY COLON SURGERY                 Anesthesia Plan    ASA 3     MAC     (Patient identified; pre-operative vital signs, all relevant labs/studies, complete medical/surgical/anesthetic history, full medication list, full allergy list, and NPO status obtained/reviewed; physical assessment performed; anesthetic options, side effects, potential complications, risks, and benefits discussed; questions answered; written anesthesia consent obtained; patient cleared for procedure; anesthesia machine and equipment checked and functioning)    Anesthetic plan, risks, benefits, and alternatives have been provided, discussed and informed consent has been obtained with: patient.        CODE STATUS:

## 2023-01-17 ENCOUNTER — ON CAMPUS - OUTPATIENT (AMBULATORY)
Dept: URBAN - METROPOLITAN AREA HOSPITAL 85 | Facility: HOSPITAL | Age: 68
End: 2023-01-17

## 2023-01-17 ENCOUNTER — HOSPITAL ENCOUNTER (OUTPATIENT)
Facility: HOSPITAL | Age: 68
Setting detail: HOSPITAL OUTPATIENT SURGERY
Discharge: HOME | End: 2023-01-17
Attending: INTERNAL MEDICINE | Admitting: INTERNAL MEDICINE
Payer: MEDICARE

## 2023-01-17 ENCOUNTER — ANESTHESIA (OUTPATIENT)
Dept: GASTROENTEROLOGY | Facility: HOSPITAL | Age: 68
End: 2023-01-17
Payer: MEDICARE

## 2023-01-17 VITALS
HEIGHT: 63 IN | RESPIRATION RATE: 15 BRPM | SYSTOLIC BLOOD PRESSURE: 147 MMHG | OXYGEN SATURATION: 100 % | TEMPERATURE: 98.6 F | BODY MASS INDEX: 23.44 KG/M2 | WEIGHT: 132.28 LBS | HEART RATE: 55 BPM | DIASTOLIC BLOOD PRESSURE: 68 MMHG

## 2023-01-17 DIAGNOSIS — D12.4 BENIGN NEOPLASM OF DESCENDING COLON: ICD-10-CM

## 2023-01-17 DIAGNOSIS — K59.00 CONSTIPATION, UNSPECIFIED: ICD-10-CM

## 2023-01-17 DIAGNOSIS — D12.2 BENIGN NEOPLASM OF ASCENDING COLON: ICD-10-CM

## 2023-01-17 DIAGNOSIS — Q45.3 PANCREATIC DUCTAL ABNORMALITY: ICD-10-CM

## 2023-01-17 DIAGNOSIS — D12.0 BENIGN NEOPLASM OF CECUM: ICD-10-CM

## 2023-01-17 DIAGNOSIS — K62.6 ULCER OF ANUS AND RECTUM: ICD-10-CM

## 2023-01-17 DIAGNOSIS — K64.1 SECOND DEGREE HEMORRHOIDS: ICD-10-CM

## 2023-01-17 DIAGNOSIS — D12.5 BENIGN NEOPLASM OF SIGMOID COLON: ICD-10-CM

## 2023-01-17 DIAGNOSIS — K62.5 HEMORRHAGE OF ANUS AND RECTUM: ICD-10-CM

## 2023-01-17 DIAGNOSIS — Q45.3 OTHER CONGENITAL MALFORMATIONS OF PANCREAS AND PANCREATIC DU: ICD-10-CM

## 2023-01-17 DIAGNOSIS — K59.00 CONSTIPATION: ICD-10-CM

## 2023-01-17 DIAGNOSIS — K62.5 RECTAL BLEEDING: ICD-10-CM

## 2023-01-17 LAB — GLUCOSE BLDC GLUCOMTR-MCNC: 105 MG/DL (ref 70–105)

## 2023-01-17 PROCEDURE — 82962 GLUCOSE BLOOD TEST: CPT

## 2023-01-17 PROCEDURE — 45380 COLONOSCOPY AND BIOPSY: CPT | Mod: 59 | Performed by: INTERNAL MEDICINE

## 2023-01-17 PROCEDURE — 25010000002 PROPOFOL 500 MG/50ML EMULSION: Performed by: ANESTHESIOLOGY

## 2023-01-17 PROCEDURE — C1769 GUIDE WIRE: HCPCS | Performed by: INTERNAL MEDICINE

## 2023-01-17 PROCEDURE — 45385 COLONOSCOPY W/LESION REMOVAL: CPT | Performed by: INTERNAL MEDICINE

## 2023-01-17 PROCEDURE — 88305 TISSUE EXAM BY PATHOLOGIST: CPT | Performed by: INTERNAL MEDICINE

## 2023-01-17 RX ORDER — SODIUM CHLORIDE 9 MG/ML
INJECTION, SOLUTION INTRAVENOUS CONTINUOUS PRN
Status: DISCONTINUED | OUTPATIENT
Start: 2023-01-17 | End: 2023-01-17 | Stop reason: SURG

## 2023-01-17 RX ORDER — SODIUM CHLORIDE 0.9 % (FLUSH) 0.9 %
10 SYRINGE (ML) INJECTION AS NEEDED
Status: DISCONTINUED | OUTPATIENT
Start: 2023-01-17 | End: 2023-01-17 | Stop reason: HOSPADM

## 2023-01-17 RX ORDER — LIDOCAINE HYDROCHLORIDE 20 MG/ML
INJECTION, SOLUTION INFILTRATION; PERINEURAL AS NEEDED
Status: DISCONTINUED | OUTPATIENT
Start: 2023-01-17 | End: 2023-01-17 | Stop reason: SURG

## 2023-01-17 RX ORDER — PROPOFOL 10 MG/ML
INJECTION, EMULSION INTRAVENOUS AS NEEDED
Status: DISCONTINUED | OUTPATIENT
Start: 2023-01-17 | End: 2023-01-17 | Stop reason: SURG

## 2023-01-17 RX ORDER — SODIUM CHLORIDE 0.9 % (FLUSH) 0.9 %
3 SYRINGE (ML) INJECTION EVERY 12 HOURS SCHEDULED
Status: DISCONTINUED | OUTPATIENT
Start: 2023-01-17 | End: 2023-01-17 | Stop reason: HOSPADM

## 2023-01-17 RX ADMIN — PROPOFOL 200 MG: 10 INJECTION, EMULSION INTRAVENOUS at 07:51

## 2023-01-17 RX ADMIN — LIDOCAINE HYDROCHLORIDE 100 MG: 20 INJECTION, SOLUTION INFILTRATION; PERINEURAL at 07:51

## 2023-01-17 RX ADMIN — SODIUM CHLORIDE: 0.9 INJECTION, SOLUTION INTRAVENOUS at 07:47

## 2023-01-17 NOTE — ANESTHESIA POSTPROCEDURE EVALUATION
Patient: Emilee Gandara    Procedure Summary     Date: 01/17/23 Room / Location: [facility] ENDOSCOPY 4 / [facility] ENDOSCOPY    Anesthesia Start: 0747 Anesthesia Stop: 0821    Procedure: COLONOSCOPY with polypectomy x5 and biopsy x1 area Diagnosis:       Rectal bleeding      Constipation      Pancreatic ductal abnormality      (Rectal bleeding [K62.5])      (Constipation [K59.00])      (Pancreatic ductal abnormality [Q45.3])    Surgeons: George Scott MD Provider: Rodrigo Hamilton MD    Anesthesia Type: MAC ASA Status: 3          Anesthesia Type: MAC    Vitals  No vitals data found for the desired time range.          Post Anesthesia Care and Evaluation    Patient location during evaluation: PACU  Patient participation: complete - patient participated  Level of consciousness: awake  Pain scale: See nurse's notes for pain score.  Pain management: adequate    Airway patency: patent  Anesthetic complications: No anesthetic complications  PONV Status: none  Cardiovascular status: acceptable  Respiratory status: acceptable and spontaneous ventilation  Hydration status: acceptable    Comments: Patient seen and examined postoperatively; vital signs stable; SpO2 greater than or equal to 90%; cardiopulmonary status stable; nausea/vomiting adequately controlled; pain adequately controlled; no apparent anesthesia complications; patient discharged from anesthesia care when discharge criteria were met

## 2023-01-17 NOTE — OP NOTE
COLONOSCOPY Procedure Report    Patient Name:  Emilee Gandara  YOB: 1955    Date of Surgery:  1/17/2023     Pre-Op Diagnosis:  Rectal bleeding [K62.5]  Constipation [K59.00]  Pancreatic ductal abnormality [Q45.3]       Postop diagnosis:  1.  Colon polyp  2.  Abnormal mucosa in the rectum  3.  Internal hemorrhoids    Procedure/CPT® Codes:      Procedure(s):  COLONOSCOPY with polypectomy x5 and biopsy x1 area    Staff:  Surgeon(s):  George Scott MD      Anesthesia: Monitored Anesthesia Care    Description of Procedure:  A description of the procedure as well as risks, benefits and alternative methods were explained to the patient who voiced understanding and signed the corresponding consent form. A physical exam was performed and vital signs were monitored throughout the procedure.    A rectal exam was performed which was normal. An Olympus colonoscope was placed into the rectum and proceeded under direct visualization through the colon until the cecum and appendiceal orifice were identified. Careful visualization occurred upon slow withdraw of the scope. The scope was then retroflexed and the distal rectum was visualized. The quality of the prep was good. The procedure was not difficult and there were no immediate complications.  There was no blood loss.    Impression:  1.  2 polyps in the cecum 4 mm and sessile removed via cold snare and sent for histopathology  2.  1 polyp in the ascending colon that was 2 mm and sessile removed via cold forcep biopsies sent for histopathology  3.  1 polyp in the descending colon that was 5 mm and sessile removed via cold snare and sent for histopathology  4.  1 polyp in the sigmoid colon that was 5 mm and sessile removed via cold snare and sent for histopathology  5.  Abnormal mucosa consistent of erythema and erosions as well as scarring in the rectum suggestive of possible prolapse changes.  Cold forcep biopsies were taken  6.  Grade 2 large internal  hemorrhoids    Recommendations:  Internal hemorrhoid banding  Follow-up biopsy results  If polyps are adenomatous, repeat colonoscopy in 3 years.  If polyps are hyperplastic, repeat colonoscopy in 10 years      George Scott MD     Date: 1/17/2023    Time: 08:18 EST

## 2023-01-17 NOTE — DISCHARGE INSTRUCTIONS
A responsible adult should stay with you and you should rest quietly for the rest of the day.    Do not drink alcohol, drive, operate any heavy machinery or power tools or make any legal/important decisions for the next 24 hours.     Progress your diet as tolerated.  If you begin to experience severe pain, increased shortness of breath, racing heartbeat or a fever above 101 F, seek immediate medical attention.     Follow up with MD as instructed. Call office for results in 3 to 5 days if needed.     DR LOPEZ [phone]  Impression:  1.  2 polyps in the cecum 4 mm and sessile removed via cold snare and sent for histopathology  2.  1 polyp in the ascending colon that was 2 mm and sessile removed via cold forcep biopsies sent for histopathology  3.  1 polyp in the descending colon that was 5 mm and sessile removed via cold snare and sent for histopathology  4.  1 polyp in the sigmoid colon that was 5 mm and sessile removed via cold snare and sent for histopathology  5.  Abnormal mucosa consistent of erythema and erosions as well as scarring in the rectum suggestive of possible prolapse changes.  Cold forcep biopsies were taken  6.  Grade 2 large internal hemorrhoids     Recommendations:  Internal hemorrhoid banding  Follow-up biopsy results  If polyps are adenomatous, repeat colonoscopy in 3 years.  If polyps are hyperplastic, repeat colonoscopy in 10 years        George Lopez MD

## 2023-01-17 NOTE — H&P
"GI CONSULT  NOTE:    Referring Provider:    Murray Lowe MD  [unfilled]    Chief complaint: <principal problem not specified>    Subjective .       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 female who presents today for Procedure(s):  COLONOSCOPY for the indications listed below.     The updated Patient Profile was reviewed prior to the procedure, in conjunction with the Physical Exam, including medical conditions, surgical procedures, medications, allergies, family history and social history.     Pre-operatively, I reviewed the indication(s) for the procedure, the risks of the procedure [including but not limited to: unexpected bleeding possibly requiring hospitalization and/or unplanned repeat procedures, perforation possibly requiring surgical treatment, missed lesions and complications of sedation/MAC (also explained by anesthesia staff)].     I have evaluated the patient for risks associated with the planned anesthesia and the procedure to be performed and find the patient an acceptable candidate for IV sedation.    Multiple opportunities were provided for any questions or concerns, and all questions were answered satisfactorily before any anesthesia was administered. We will proceed with the planned procedure.    Past Medical History:  Past Medical History:   Diagnosis Date   • Anxiety    • Depression    • Diabetes mellitus (HCC)    • Hyperlipidemia    • Hypertension    • Lichen planus     MOUTH - CAUSES SORES, WEBBING AND INFLAMATION   • Pancreatic ductal abnormality     \"swollen\"   • Recurrent UTI    • Tachycardia        Past Surgical History:  Past Surgical History:   Procedure Laterality Date   • COLON SURGERY  2003    PART OF COLON/REMOVED   • TOTAL ABDOMINAL HYSTERECTOMY  1987       Social History:  Social History     Tobacco Use   • Smoking status: Never   • " Smokeless tobacco: Never   Vaping Use   • Vaping Use: Never used   Substance Use Topics   • Alcohol use: No   • Drug use: No       Family History:  Family History   Problem Relation Age of Onset   • Atrial fibrillation Mother    • Heart disease Mother    • Hypertension Sister    • Breast cancer Paternal Aunt 54   • Ovarian cancer Maternal Grandmother 80   • Cancer Other    • Atrial fibrillation Daughter        Medications:  Medications Prior to Admission   Medication Sig Dispense Refill Last Dose   • atenolol (TENORMIN) 25 MG tablet Take 2 tablets by mouth Daily. 180 tablet 3 1/17/2023   • atorvastatin (LIPITOR) 40 MG tablet TAKE 1 TABLET BY MOUTH EVERY NIGHT 90 tablet 1 1/16/2023   • DULoxetine (CYMBALTA) 60 MG capsule TAKE 1 CAPSULE BY MOUTH DAILY 90 capsule 1 1/17/2023   • escitalopram (LEXAPRO) 10 MG tablet Take 1 tablet by mouth Daily.   1/17/2023   • magnesium chloride ER 64 MG DR tablet Take  by mouth Daily.   1/16/2023   • metFORMIN (GLUCOPHAGE) 500 MG tablet Take 1 tablet by mouth 2 (Two) Times a Day With Meals.   1/16/2023   • metFORMIN ER (GLUCOPHAGE-XR) 500 MG 24 hr tablet Take 1,000 mg by mouth Daily With Breakfast.   1/16/2023   • Multiple Vitamin (MULTI-DAY VITAMINS) tablet MULTI-DAY VITAMINS TABS      • omeprazole (priLOSEC) 40 MG capsule Take 40 mg by mouth Daily.   1/16/2023   • ONETOUCH DELICA LANCETS 33G misc ONETOUCH DELICA LANCETS 33G   1/16/2023   • phenazopyridine (PYRIDIUM) 200 MG tablet Take 1 tablet by mouth 3 (Three) Times a Day As Needed. for pain   Past Week   • traMADol (ULTRAM) 50 MG tablet Take 50 mg by mouth Every 8 (Eight) Hours As Needed for Moderate Pain .   Past Week   • atenolol (TENORMIN) 25 MG tablet Take 25 mg by mouth Daily.      • dicyclomine (BENTYL) 10 MG capsule       • ESTRACE VAGINAL 0.1 MG/GM vaginal cream   0    • ferrous sulfate 325 (65 FE) MG tablet Take 1 tablet by mouth Daily With Breakfast.      • linaclotide (LINZESS) 145 MCG capsule capsule Take 1 capsule by  "mouth Daily.      • triamcinolone (KENALOG) 0.1 % paste Apply 1 application to the mouth or throat.   Unknown   • Vitamin D, Cholecalciferol, (CHOLECALCIFEROL) 10 MCG (400 UNIT) tablet Take 400 Units by mouth Daily.   Unknown   • zinc sulfate (ZINCATE) 220 (50 Zn) MG capsule Take 220 mg by mouth Daily.          Scheduled Meds:  Continuous Infusions:No current facility-administered medications for this encounter.    PRN Meds:.    ALLERGIES:  Prednisone and Sulfa antibiotics    ROS:  The following systems were reviewed and negative;  Constitution:  No fevers, chills, no unintentional weight loss  Skin: no rash, no jaundice  Eyes:  No blurry vision, no eye pain  HENT:  No change in hearing or smell  Resp:  No dyspnea or cough  CV:  No chest pain or palpitations  :  No dysuria, hematuria  Musculoskeletal:  No leg cramps or arthralgias  Neuro:  No tremor, no numbness  Psych:  No depression or confsusion    Objective     Vital Signs:   Vitals:    01/11/23 0959 01/17/23 0650   BP:  145/64   BP Location:  Left arm   Patient Position:  Lying   Pulse:  60   Resp:  12   Temp:  98.6 °F (37 °C)   TempSrc:  Oral   SpO2:  93%   Weight: 61.2 kg (135 lb) 60 kg (132 lb 4.4 oz)   Height: 160 cm (63\") 160 cm (63\")       Physical Exam:       General Appearance:    Awake and alert, in no acute distress   Head:    Normocephalic, without obvious abnormality, atraumatic   Throat:   No oral lesions, no thrush, oral mucosa moist   Lungs:     respirations regular, even and unlabored   Skin:   No rash, no jaundice       Results Review:  Lab Results (last 24 hours)     Procedure Component Value Units Date/Time    POC Glucose Once [[id number]]  (Normal) Collected: 01/17/23 0633    Specimen: Blood Updated: 01/17/23 0634     Glucose 105 mg/dL      Comment: Serial Number: [id number]:  [id number]             Imaging Results (Last 24 Hours)     ** No results found for the last 24 hours. **           I reviewed the patient's labs and " imaging.    ASSESSMENT AND PLAN:      Active Problems:    * No active hospital problems. *       Procedure(s):  COLONOSCOPY      I discussed the patient's findings and my recommendations with the patient.    George Scott MD  01/17/23  07:46 EST

## 2023-01-19 LAB
LAB AP CASE REPORT: NORMAL
PATH REPORT.FINAL DX SPEC: NORMAL
PATH REPORT.GROSS SPEC: NORMAL

## 2023-01-30 ENCOUNTER — OFFICE (AMBULATORY)
Dept: URBAN - METROPOLITAN AREA CLINIC 64 | Facility: CLINIC | Age: 68
End: 2023-01-30

## 2023-01-30 VITALS
WEIGHT: 133 LBS | DIASTOLIC BLOOD PRESSURE: 89 MMHG | SYSTOLIC BLOOD PRESSURE: 159 MMHG | HEIGHT: 63 IN | HEART RATE: 60 BPM

## 2023-01-30 DIAGNOSIS — K62.5 HEMORRHAGE OF ANUS AND RECTUM: ICD-10-CM

## 2023-01-30 DIAGNOSIS — R10.33 PERIUMBILICAL PAIN: ICD-10-CM

## 2023-01-30 DIAGNOSIS — K30 FUNCTIONAL DYSPEPSIA: ICD-10-CM

## 2023-01-30 DIAGNOSIS — K64.8 OTHER HEMORRHOIDS: ICD-10-CM

## 2023-01-30 PROCEDURE — 99213 OFFICE O/P EST LOW 20 MIN: CPT | Performed by: NURSE PRACTITIONER

## 2023-02-16 ENCOUNTER — OFFICE VISIT (OUTPATIENT)
Dept: SURGERY | Facility: CLINIC | Age: 68
End: 2023-02-16
Payer: MEDICARE

## 2023-02-16 VITALS
HEIGHT: 63 IN | SYSTOLIC BLOOD PRESSURE: 128 MMHG | RESPIRATION RATE: 18 BRPM | DIASTOLIC BLOOD PRESSURE: 74 MMHG | HEART RATE: 64 BPM | TEMPERATURE: 97.8 F | BODY MASS INDEX: 23.42 KG/M2 | OXYGEN SATURATION: 96 % | WEIGHT: 132.2 LBS

## 2023-02-16 DIAGNOSIS — K42.9 UMBILICAL HERNIA WITHOUT OBSTRUCTION AND WITHOUT GANGRENE: Primary | ICD-10-CM

## 2023-02-16 PROCEDURE — 99204 OFFICE O/P NEW MOD 45 MIN: CPT | Performed by: SURGERY

## 2023-02-16 RX ORDER — LANOLIN ALCOHOL/MO/W.PET/CERES
1000 CREAM (GRAM) TOPICAL DAILY
COMMUNITY

## 2023-02-16 RX ORDER — AMOXICILLIN AND CLAVULANATE POTASSIUM 875; 125 MG/1; MG/1
1 TABLET, FILM COATED ORAL EVERY 12 HOURS SCHEDULED
COMMUNITY
Start: 2022-11-17 | End: 2023-02-25

## 2023-02-16 RX ORDER — GENTAMICIN SULFATE 3 MG/ML
SOLUTION/ DROPS OPHTHALMIC
COMMUNITY
Start: 2023-01-11

## 2023-02-16 NOTE — PROGRESS NOTES
"Subjective   Emilee Gandara is a 67 y.o. female.   Chief Complaint   Patient presents with   • Hernia     New Pt Ref Self, Umb Hernia     /74 (BP Location: Right arm, Patient Position: Sitting, Cuff Size: Adult)   Pulse 64   Temp 97.8 °F (36.6 °C) (Infrared)   Resp 18   Ht 160 cm (63\")   Wt 60 kg (132 lb 3.2 oz)   SpO2 96%   BMI 23.42 kg/m²     HISTORY OF PRESENT ILLNESS:  67-year-old lady with a history of chronic lower abdominal pain.  She says that for many years she has had pain that begins in her left lower quadrant and radiates laterally.  It is a very deep ache it is worse when she has been up on her feet all day when she has to lift a lot.  Heating pad is used to make it better. she says that this was present prior to a surgery that she had for a bladder suspension which sounds like it was done laparoscopically required multiple incisions.  After that surgery she also developed a small hernia at her umbilicus.  The hernia at her umbilicus does not seem to be causing her any trouble.  She recently had endoscopy and had removal of several polyps and then was referred to me after that.  She did have a work-up a few weeks ago at [facility] which did not show any evidence  of acute intra-abdominal process and the umbilical hernia was the only abnormality noted.    Of note she does have a fairly extensive surgical history she says that she has had a history of a hysterectomy tubal ligation and exploratory surgery with bowel resection complicated by anastomotic leak requiring reoperation and a bladder suspension.      Outpatient Encounter Medications as of 2/16/2023   Medication Sig Dispense Refill   • amoxicillin-clavulanate (AUGMENTIN) 875-125 MG per tablet Take 1 tablet by mouth Every 12 (Twelve) Hours.     • atenolol (TENORMIN) 25 MG tablet Take 2 tablets by mouth Daily. 180 tablet 3   • atorvastatin (LIPITOR) 40 MG tablet TAKE 1 TABLET BY MOUTH EVERY NIGHT 90 tablet 1   • dicyclomine " (BENTYL) 10 MG capsule      • DULoxetine (CYMBALTA) 60 MG capsule TAKE 1 CAPSULE BY MOUTH DAILY 90 capsule 1   • escitalopram (LEXAPRO) 10 MG tablet Take 1 tablet by mouth Daily.     • gentamicin (GARAMYCIN) 0.3 % ophthalmic solution PLACE 2 DROPS TO AFFECTED RIGHT THUMB NIGHTLY     • linaclotide (LINZESS) 145 MCG capsule capsule Take 1 capsule by mouth Daily.     • magnesium chloride ER 64 MG DR tablet Take  by mouth Daily.     • metFORMIN (GLUCOPHAGE) 500 MG tablet Take 1 tablet by mouth 2 (Two) Times a Day With Meals.     • Multiple Vitamin (MULTI-DAY VITAMINS) tablet MULTI-DAY VITAMINS TABS     • omeprazole (priLOSEC) 40 MG capsule Take 40 mg by mouth Daily.     • phenazopyridine (PYRIDIUM) 200 MG tablet Take 1 tablet by mouth 3 (Three) Times a Day As Needed. for pain     • traMADol (ULTRAM) 50 MG tablet Take 50 mg by mouth Every 8 (Eight) Hours As Needed for Moderate Pain .     • triamcinolone (KENALOG) 0.1 % paste Apply 1 application to the mouth or throat.     • vitamin B-12 (CYANOCOBALAMIN) 1000 MCG tablet Take 1,000 mcg by mouth Daily.     • [DISCONTINUED] atenolol (TENORMIN) 25 MG tablet Take 25 mg by mouth Daily.     • [DISCONTINUED] ESTRACE VAGINAL 0.1 MG/GM vaginal cream   0   • [DISCONTINUED] ferrous sulfate 325 (65 FE) MG tablet Take 1 tablet by mouth Daily With Breakfast.     • [DISCONTINUED] metFORMIN ER (GLUCOPHAGE-XR) 500 MG 24 hr tablet Take 1,000 mg by mouth Daily With Breakfast.     • [DISCONTINUED] ONETOUCH DELICA LANCETS 33G misc ONETOUCH DELICA LANCETS 33G     • [DISCONTINUED] Vitamin D, Cholecalciferol, (CHOLECALCIFEROL) 10 MCG (400 UNIT) tablet Take 400 Units by mouth Daily.     • [DISCONTINUED] zinc sulfate (ZINCATE) 220 (50 Zn) MG capsule Take 220 mg by mouth Daily.       No facility-administered encounter medications on file as of 2/16/2023.         The following portions of the patient's history were reviewed and updated as appropriate: allergies, current medications, past family  history, past medical history, past social history, past surgical history and problem list.    Review of Systems  Complete review of systems been obtained is positive for night sweating mouth sores hearing loss environmental allergies breast lump anal bleeding constipation cold intolerance heat intolerance pelvic pain joint swelling back pain numbness weakness and depression the remainder of the review of systems is negative  Objective     Physical Exam  Constitutional:       Appearance: Normal appearance.   HENT:      Head: Normocephalic and atraumatic.   Pulmonary:      Effort: Pulmonary effort is normal. No respiratory distress.   Abdominal:      General: There is no distension.      Palpations: Abdomen is soft.      Comments: Soft reducible umbilical hernia does not reproduce her pain   Skin:     General: Skin is warm and dry.   Neurological:      General: No focal deficit present.      Mental Status: She is alert. Mental status is at baseline.   Psychiatric:         Mood and Affect: Mood normal.         Behavior: Behavior normal.           Assessment & Plan   Diagnoses and all orders for this visit:    1. Umbilical hernia without obstruction and without gangrene (Primary)    My opinion is that the umbilical hernia is a small reducible defect that does not seem to reproduce symptoms and is unlikely to fix her left lower quadrant abdominal pain.  This is also supported by the fact that the pain preexisted the development of this hernia.   because of this I have carefully counseled her about whether not to repair the hernia and since this is a small asymptomatic hernia likely would not benefit from repair at this point would only complicate the diagnosis of her abdominal pain.  With regards to her left lower quadrant abdominal pain some possibilities could be scar tissu which are often not diagnosed on CT scan.  I talked her about some of the risks of diagnostic laparoscopy and lysis of adhesions.  Talked with the  possibility for open surgery and incidental injury.  Alternatively she does have a history of back and neck issues and does see pain management for injections a alternative diagnosis for this is a degenerative disc issue with pain that radiates from the back around to the abdomen.  I have encouraged her to talk to her primary care doctor and pain management about this is a possibility to see if any injections can be done to see if that will help in this distribution.  Alternatively after I counseled her about the risks and benefits of diagnostic laparoscopy or exploratory laparotomy these are things that she can think about it and if it something that she wants to have performed in the future would ask her to come back for reevaluation and had a second counseling session and if she wanted to move forward will be willing to evaluate the abdominal cavity.  This is a chronic problem with progression of symptoms and counseled about a major abdominal surgery with risk factors for morbidity including her history of multiple abdominal surgeries      Freddie Maddox MD  2/16/2023  2:57 PM EST    This note was created using Dragon Voice Recognition software.

## 2023-02-24 ENCOUNTER — OFFICE (AMBULATORY)
Dept: URBAN - METROPOLITAN AREA CLINIC 64 | Facility: CLINIC | Age: 68
End: 2023-02-24

## 2023-02-24 VITALS
HEIGHT: 63 IN | SYSTOLIC BLOOD PRESSURE: 144 MMHG | HEART RATE: 59 BPM | WEIGHT: 133 LBS | DIASTOLIC BLOOD PRESSURE: 86 MMHG

## 2023-02-24 DIAGNOSIS — R13.10 DYSPHAGIA, UNSPECIFIED: ICD-10-CM

## 2023-02-24 DIAGNOSIS — R10.32 LEFT LOWER QUADRANT PAIN: ICD-10-CM

## 2023-02-24 DIAGNOSIS — K59.00 CONSTIPATION, UNSPECIFIED: ICD-10-CM

## 2023-02-24 PROCEDURE — 99214 OFFICE O/P EST MOD 30 MIN: CPT | Performed by: NURSE PRACTITIONER

## 2023-02-25 PROCEDURE — 87086 URINE CULTURE/COLONY COUNT: CPT | Performed by: NURSE PRACTITIONER

## 2023-02-28 ENCOUNTER — OFFICE (AMBULATORY)
Dept: URBAN - METROPOLITAN AREA PATHOLOGY 4 | Facility: PATHOLOGY | Age: 68
End: 2023-02-28

## 2023-02-28 ENCOUNTER — OFFICE (AMBULATORY)
Dept: URBAN - METROPOLITAN AREA PATHOLOGY 4 | Facility: PATHOLOGY | Age: 68
End: 2023-02-28
Payer: COMMERCIAL

## 2023-02-28 ENCOUNTER — ON CAMPUS - OUTPATIENT (AMBULATORY)
Dept: URBAN - METROPOLITAN AREA HOSPITAL 2 | Facility: HOSPITAL | Age: 68
End: 2023-02-28

## 2023-02-28 VITALS
RESPIRATION RATE: 19 BRPM | DIASTOLIC BLOOD PRESSURE: 74 MMHG | SYSTOLIC BLOOD PRESSURE: 153 MMHG | DIASTOLIC BLOOD PRESSURE: 100 MMHG | HEART RATE: 67 BPM | SYSTOLIC BLOOD PRESSURE: 106 MMHG | RESPIRATION RATE: 14 BRPM | WEIGHT: 132 LBS | SYSTOLIC BLOOD PRESSURE: 119 MMHG | DIASTOLIC BLOOD PRESSURE: 57 MMHG | OXYGEN SATURATION: 97 % | SYSTOLIC BLOOD PRESSURE: 139 MMHG | HEART RATE: 63 BPM | HEIGHT: 63 IN | TEMPERATURE: 97.8 F | SYSTOLIC BLOOD PRESSURE: 163 MMHG | DIASTOLIC BLOOD PRESSURE: 69 MMHG | RESPIRATION RATE: 16 BRPM | RESPIRATION RATE: 20 BRPM | OXYGEN SATURATION: 99 % | HEART RATE: 65 BPM | DIASTOLIC BLOOD PRESSURE: 70 MMHG | HEART RATE: 59 BPM | HEART RATE: 71 BPM

## 2023-02-28 DIAGNOSIS — R10.12 LEFT UPPER QUADRANT PAIN: ICD-10-CM

## 2023-02-28 DIAGNOSIS — K31.89 OTHER DISEASES OF STOMACH AND DUODENUM: ICD-10-CM

## 2023-02-28 DIAGNOSIS — K22.2 ESOPHAGEAL OBSTRUCTION: ICD-10-CM

## 2023-02-28 DIAGNOSIS — R13.10 DYSPHAGIA, UNSPECIFIED: ICD-10-CM

## 2023-02-28 LAB
GI HISTOLOGY: A. SELECT: (no result)
GI HISTOLOGY: PDF REPORT: (no result)

## 2023-02-28 PROCEDURE — 43239 EGD BIOPSY SINGLE/MULTIPLE: CPT | Performed by: INTERNAL MEDICINE

## 2023-02-28 PROCEDURE — 88305 TISSUE EXAM BY PATHOLOGIST: CPT | Mod: 26 | Performed by: INTERNAL MEDICINE

## 2023-02-28 PROCEDURE — 43450 DILATE ESOPHAGUS 1/MULT PASS: CPT | Performed by: INTERNAL MEDICINE

## 2023-03-02 ENCOUNTER — OFFICE (AMBULATORY)
Dept: URBAN - METROPOLITAN AREA CLINIC 64 | Facility: CLINIC | Age: 68
End: 2023-03-02

## 2023-03-02 VITALS — HEIGHT: 63 IN

## 2023-03-02 DIAGNOSIS — K64.1 SECOND DEGREE HEMORRHOIDS: ICD-10-CM

## 2023-03-02 PROCEDURE — 46221 LIGATION OF HEMORRHOID(S): CPT | Performed by: INTERNAL MEDICINE

## 2023-03-02 RX ORDER — HYDROCODONE BITARTRATE AND ACETAMINOPHEN 5; 325 MG/1; MG/1
20 TABLET ORAL
Qty: 3 | Refills: 0 | Status: COMPLETED
Start: 2023-03-02 | End: 2023-06-27

## 2023-03-27 ENCOUNTER — OFFICE (AMBULATORY)
Dept: URBAN - METROPOLITAN AREA CLINIC 64 | Facility: CLINIC | Age: 68
End: 2023-03-27

## 2023-03-27 VITALS — HEIGHT: 63 IN

## 2023-03-27 DIAGNOSIS — K64.1 SECOND DEGREE HEMORRHOIDS: ICD-10-CM

## 2023-03-27 PROCEDURE — 46221 LIGATION OF HEMORRHOID(S): CPT | Performed by: INTERNAL MEDICINE

## 2023-04-11 ENCOUNTER — OFFICE VISIT (OUTPATIENT)
Dept: CARDIOLOGY | Facility: CLINIC | Age: 68
End: 2023-04-11
Payer: MEDICARE

## 2023-04-11 VITALS
OXYGEN SATURATION: 99 % | DIASTOLIC BLOOD PRESSURE: 72 MMHG | SYSTOLIC BLOOD PRESSURE: 140 MMHG | BODY MASS INDEX: 24.1 KG/M2 | HEART RATE: 60 BPM | HEIGHT: 63 IN | WEIGHT: 136 LBS

## 2023-04-11 DIAGNOSIS — I20.9 ANGINA PECTORIS: Primary | ICD-10-CM

## 2023-04-11 DIAGNOSIS — R00.2 PALPITATION: ICD-10-CM

## 2023-04-11 DIAGNOSIS — R00.0 TACHYCARDIA: ICD-10-CM

## 2023-04-11 PROCEDURE — 1160F RVW MEDS BY RX/DR IN RCRD: CPT | Performed by: INTERNAL MEDICINE

## 2023-04-11 PROCEDURE — 1159F MED LIST DOCD IN RCRD: CPT | Performed by: INTERNAL MEDICINE

## 2023-04-11 PROCEDURE — 93000 ELECTROCARDIOGRAM COMPLETE: CPT | Performed by: INTERNAL MEDICINE

## 2023-04-11 PROCEDURE — 99214 OFFICE O/P EST MOD 30 MIN: CPT | Performed by: INTERNAL MEDICINE

## 2023-04-11 RX ORDER — PREGABALIN 75 MG/1
1 CAPSULE ORAL 2 TIMES DAILY
COMMUNITY
Start: 2023-04-03

## 2023-04-11 RX ORDER — ERGOCALCIFEROL (VITAMIN D2) 10 MCG
400 TABLET ORAL DAILY
COMMUNITY

## 2023-04-11 RX ORDER — BIOTIN 1 MG
1000 TABLET ORAL DAILY
COMMUNITY

## 2023-04-11 NOTE — PROGRESS NOTES
"Progress note      Name: Emilee Gandara ADMIT: (Not on file)   : 1955  PCP: Murray Lowe MD    MRN: [id number] LOS: 0 days   AGE/SEX: 67 y.o. female  ROOM: Room/bed info not found     Chief Complaint   Patient presents with   • Rapid Heart Rate       Subjective       History of present illness  Emilee Gandara is a 67-year-old female patient who has diabetes, dyslipidemia, atrial tachycardia diagnosed in 2022, is here today for follow-up.  Generally the patient is feeling well denies having any chest pain or palpitations, she recently had a injection in her spine, and was told that she had irregular heartbeat.  Also a few weeks back patient experienced chest discomfort which was felt as heaviness, also radiation down to her right arm.  The episode might have lasted about 30 minutes.    Past Medical History:   Diagnosis Date   • Anxiety    • Depression    • Diabetes mellitus    • Hyperlipidemia    • Hypertension    • Lichen planus     MOUTH - CAUSES SORES, WEBBING AND INFLAMATION   • Pancreatic ductal abnormality     \"swollen\"   • Recurrent UTI    • Tachycardia      Past Surgical History:   Procedure Laterality Date   • COLON SURGERY      PART OF COLON/REMOVED   • COLONOSCOPY N/A 2023    Procedure: COLONOSCOPY with polypectomy x5 and biopsy x1 area;  Surgeon: George Scott MD;  Location: [facility] ENDOSCOPY;  Service: Gastroenterology;  Laterality: N/A;  colon polyps.melanosis,abnormal rectal mucosa   • ESOPHAGOSCOPY W/ DILATION     • TOTAL ABDOMINAL HYSTERECTOMY       Family History   Problem Relation Age of Onset   • Atrial fibrillation Mother    • Heart disease Mother    • Hypertension Sister    • Breast cancer Paternal Aunt 54   • Ovarian cancer Maternal Grandmother 80   • Cancer Other    • Atrial fibrillation Daughter      Social History     Tobacco Use   • Smoking status: Never     Passive exposure: Never   • Smokeless tobacco: Never   Vaping Use   • Vaping Use: Never used "   Substance Use Topics   • Alcohol use: No   • Drug use: No     (Not in a hospital admission)    Allergies:  Prednisone and Sulfa antibiotics      Physical Exam  VITALS REVIEWED    General:      well developed, in no acute distress.    Head:      normocephalic and atraumatic.    Eyes:      PERRL/EOM intact, conjunctiva and sclera clear with out nystagmus.    Neck:      no masses, thyromegaly,  trachea central with normal respiratory effort and PMI displaced laterally  Lungs:      Clear to auscultation bilaterally  Heart:       Regular rate and rhythm  Msk:      no deformity or scoliosis noted of thoracic or lumbar spine.    Pulses:      pulses normal in all 4 extremities.    Extremities:       No lower extremity edema  Neurologic:      no focal deficits.   alert oriented x3  Skin:      intact without lesions or rashes.    Psych:      alert and cooperative; normal mood and affect; normal attention span and concentration.      Result Review :               Pertinent cardiac workup    1. EKG 3/28/2022 atrial tachycardia heart rate of 144 bpm.  2. Echo 3/29/2022 ejection fraction 60 to 65%  3. Event monitor 3/29/2022 for 25 days showed sinus rhythm, no A. fib or atrial flutter, short runs of ectopy noted.         ECG 12 Lead    Date/Time: 4/11/2023 2:19 PM  Performed by: Jerry Pino MD  Authorized by: Jerry Pino MD   Comparison: compared with previous ECG   Similar to previous ECG  Rhythm: sinus rhythm  Ectopy: atrial premature contractions  Rate: normal  BPM: 63  Conduction: conduction normal  ST Segments: ST segments normal  QRS axis: normal  Other findings: non-specific ST-T wave changes                Assessment and Plan      Emilee Gandara is a 67-year-old female patient with no apparent history of coronary artery disease, she had episode of atrial tachycardia in March 2022, currently she is on atenolol with no apparent recurrences.  Patient experienced chest discomfort a few weeks back, felt as  heaviness, could have been an episode of angina.  I would like to get a stress test since she has not had ischemic work-up for some time.  Also I would like to get a 2-week Holter monitor to make sure she does not have any A-fib since she was told she had on the monitor when she had the spinal injection.  I will see her after completion of the tests.    Diagnoses and all orders for this visit:    1. Angina pectoris (Primary)  -     Holter Monitor - 72 Hour Up To 15 Days; Future  -     Stress Test With Myocardial Perfusion One Day; Future    2. Palpitation  -     Holter Monitor - 72 Hour Up To 15 Days; Future    3. Tachycardia           No follow-ups on file.  Patient was given instructions and counseling regarding her condition or for health maintenance advice. Please see specific information pulled into the AVS if appropriate.

## 2023-04-17 RX ORDER — APIXABAN 5 MG/1
TABLET, FILM COATED ORAL
Qty: 180 TABLET | Refills: 3 | OUTPATIENT
Start: 2023-04-17

## 2023-04-20 ENCOUNTER — OFFICE (AMBULATORY)
Dept: URBAN - METROPOLITAN AREA CLINIC 64 | Facility: CLINIC | Age: 68
End: 2023-04-20

## 2023-04-20 VITALS — HEIGHT: 63 IN

## 2023-04-20 DIAGNOSIS — K64.0 FIRST DEGREE HEMORRHOIDS: ICD-10-CM

## 2023-04-20 PROCEDURE — 46221 LIGATION OF HEMORRHOID(S): CPT | Performed by: INTERNAL MEDICINE

## 2023-04-21 PROBLEM — K64.8 BLEEDING INTERNAL HEMORRHOIDS: Status: ACTIVE | Noted: 2023-03-27

## 2023-05-02 ENCOUNTER — TELEPHONE (OUTPATIENT)
Dept: CARDIOLOGY | Facility: HOSPITAL | Age: 68
End: 2023-05-02
Payer: MEDICARE

## 2023-05-02 ENCOUNTER — HOSPITAL ENCOUNTER (OUTPATIENT)
Dept: CARDIOLOGY | Facility: HOSPITAL | Age: 68
Discharge: HOME | End: 2023-05-02
Payer: MEDICARE

## 2023-05-02 ENCOUNTER — HOSPITAL ENCOUNTER (OUTPATIENT)
Dept: CARDIOLOGY | Facility: HOSPITAL | Age: 68
End: 2023-05-02
Payer: MEDICARE

## 2023-05-02 DIAGNOSIS — I20.9 ANGINA PECTORIS: ICD-10-CM

## 2023-05-02 NOTE — TELEPHONE ENCOUNTER
Yes, we can reschedule the Myoview, please put in a note in the chart that is done that way I can call her.  Thank you

## 2023-05-02 NOTE — TELEPHONE ENCOUNTER
Pt was jaime for nuclear she had coffee she was to see you after you are booked out 4 weeks I didn't know if you wanted to make the pt wait that long or ok to call with results.

## 2023-05-03 ENCOUNTER — HOSPITAL ENCOUNTER (OUTPATIENT)
Dept: CARDIOLOGY | Facility: HOSPITAL | Age: 68
Discharge: HOME | End: 2023-05-03
Payer: MEDICARE

## 2023-05-03 LAB
BH CV REST NUCLEAR ISOTOPE DOSE: 11 MCI
BH CV STRESS COMMENTS STAGE 1: NORMAL
BH CV STRESS DOSE REGADENOSON STAGE 1: 0.4
BH CV STRESS DURATION MIN STAGE 1: 0
BH CV STRESS DURATION SEC STAGE 1: 10
BH CV STRESS NUCLEAR ISOTOPE DOSE: 34 MCI
BH CV STRESS PROTOCOL 1: NORMAL
BH CV STRESS RECOVERY BP: NORMAL MMHG
BH CV STRESS RECOVERY HR: 82 BPM
BH CV STRESS STAGE 1: 1
MAXIMAL PREDICTED HEART RATE: 151 BPM
STRESS BASELINE BP: NORMAL MMHG
STRESS BASELINE HR: 58 BPM
STRESS TARGET HR: 128 BPM

## 2023-05-03 PROCEDURE — 0 TECHNETIUM SESTAMIBI: Performed by: INTERNAL MEDICINE

## 2023-05-03 PROCEDURE — 93017 CV STRESS TEST TRACING ONLY: CPT

## 2023-05-03 PROCEDURE — 25010000002 REGADENOSON 0.4 MG/5ML SOLUTION: Performed by: INTERNAL MEDICINE

## 2023-05-03 PROCEDURE — A9500 TC99M SESTAMIBI: HCPCS | Performed by: INTERNAL MEDICINE

## 2023-05-03 PROCEDURE — 78452 HT MUSCLE IMAGE SPECT MULT: CPT

## 2023-05-03 RX ORDER — REGADENOSON 0.08 MG/ML
0.4 INJECTION, SOLUTION INTRAVENOUS
Status: COMPLETED | OUTPATIENT
Start: 2023-05-03 | End: 2023-05-03

## 2023-05-03 RX ADMIN — TECHNETIUM TC 99M SESTAMIBI 1 DOSE: 1 INJECTION INTRAVENOUS at 10:21

## 2023-05-03 RX ADMIN — REGADENOSON 0.4 MG: 0.08 INJECTION, SOLUTION INTRAVENOUS at 10:21

## 2023-05-03 RX ADMIN — TECHNETIUM TC 99M SESTAMIBI 1 DOSE: 1 INJECTION INTRAVENOUS at 08:12

## 2023-05-09 ENCOUNTER — TELEPHONE (OUTPATIENT)
Dept: CARDIOLOGY | Facility: CLINIC | Age: 68
End: 2023-05-09
Payer: MEDICARE

## 2023-05-09 LAB
BH CV REST NUCLEAR ISOTOPE DOSE: 11 MCI
BH CV STRESS COMMENTS STAGE 1: NORMAL
BH CV STRESS DOSE REGADENOSON STAGE 1: 0.4
BH CV STRESS DURATION MIN STAGE 1: 0
BH CV STRESS DURATION SEC STAGE 1: 10
BH CV STRESS NUCLEAR ISOTOPE DOSE: 34 MCI
BH CV STRESS PROTOCOL 1: NORMAL
BH CV STRESS RECOVERY BP: NORMAL MMHG
BH CV STRESS RECOVERY HR: 82 BPM
BH CV STRESS STAGE 1: 1
LV EF NUC BP: 63 %
MAXIMAL PREDICTED HEART RATE: 151 BPM
STRESS BASELINE BP: NORMAL MMHG
STRESS BASELINE HR: 58 BPM
STRESS TARGET HR: 128 BPM

## 2023-05-23 ENCOUNTER — TELEPHONE (OUTPATIENT)
Dept: CARDIOLOGY | Facility: CLINIC | Age: 68
End: 2023-05-23
Payer: MEDICARE

## 2023-05-23 NOTE — TELEPHONE ENCOUNTER
Caller: KARIS    Relationship: SELF    Best call back number: [phone]    What is the best time to reach you: ANY OKAY TO LEAVE A MESSAGE        What was the call regarding: PT'S ARTHRITIS PROVIDER WANTED HER TO CHECK WITH [facility] TO SEE IF IT WAS SAFE FOR HER TO START TAKING HYDROXYCHLOROQUINE (PLAQUENIL)  SHE GOES BACK TO THEM ON 5/25/23 AND NEEDS A CALL BACK BEFORE THAT APPOINTMENT.    Do you require a callback: YES

## 2023-06-27 ENCOUNTER — OFFICE (AMBULATORY)
Dept: URBAN - METROPOLITAN AREA CLINIC 64 | Facility: CLINIC | Age: 68
End: 2023-06-27

## 2023-06-27 VITALS
WEIGHT: 133 LBS | HEIGHT: 63 IN | HEART RATE: 54 BPM | DIASTOLIC BLOOD PRESSURE: 83 MMHG | SYSTOLIC BLOOD PRESSURE: 149 MMHG

## 2023-06-27 DIAGNOSIS — K58.1 IRRITABLE BOWEL SYNDROME WITH CONSTIPATION: ICD-10-CM

## 2023-06-27 DIAGNOSIS — R10.32 LEFT LOWER QUADRANT PAIN: ICD-10-CM

## 2023-06-27 PROCEDURE — 99214 OFFICE O/P EST MOD 30 MIN: CPT

## 2023-06-27 RX ORDER — TENAPANOR HYDROCHLORIDE 53.2 MG/1
100 TABLET ORAL
Qty: 60 | Refills: 3 | Status: COMPLETED
Start: 2023-06-27 | End: 2024-03-08

## 2023-07-10 PROBLEM — R55 NEAR SYNCOPE: Status: ACTIVE | Noted: 2023-07-10

## 2023-07-11 PROBLEM — E11.9 TYPE 2 DIABETES MELLITUS, WITHOUT LONG-TERM CURRENT USE OF INSULIN: Status: ACTIVE | Noted: 2023-07-11

## 2023-07-21 ENCOUNTER — LAB (OUTPATIENT)
Dept: LAB | Facility: HOSPITAL | Age: 68
End: 2023-07-21
Payer: MEDICARE

## 2023-07-21 ENCOUNTER — TRANSCRIBE ORDERS (OUTPATIENT)
Dept: ADMINISTRATIVE | Facility: HOSPITAL | Age: 68
End: 2023-07-21
Payer: MEDICARE

## 2023-07-21 DIAGNOSIS — R55 NEAR SYNCOPE: Primary | ICD-10-CM

## 2023-07-21 DIAGNOSIS — R55 NEAR SYNCOPE: ICD-10-CM

## 2023-07-21 LAB
GLUCOSE 1H P 100 G GLC PO SERPL-MCNC: 250 MG/DL (ref 74–180)
GLUCOSE 2H P 100 G GLC PO SERPL-MCNC: 221 MG/DL (ref 74–155)
GLUCOSE 3H P 100 G GLC PO SERPL-MCNC: 134 MG/DL (ref 74–140)
GLUCOSE 4H P CHAL SERPL-MCNC: 59 MG/DL (ref 74–106)
GLUCOSE P FAST SERPL-MCNC: 110 MG/DL (ref 74–106)

## 2023-07-21 PROCEDURE — 82951 GLUCOSE TOLERANCE TEST (GTT): CPT

## 2023-07-21 PROCEDURE — 82952 GTT-ADDED SAMPLES: CPT

## 2023-07-21 PROCEDURE — 36415 COLL VENOUS BLD VENIPUNCTURE: CPT

## 2023-07-26 ENCOUNTER — OFFICE VISIT (OUTPATIENT)
Dept: ENDOCRINOLOGY | Facility: CLINIC | Age: 68
End: 2023-07-26
Payer: MEDICARE

## 2023-07-26 VITALS
BODY MASS INDEX: 23.92 KG/M2 | OXYGEN SATURATION: 99 % | DIASTOLIC BLOOD PRESSURE: 80 MMHG | SYSTOLIC BLOOD PRESSURE: 138 MMHG | HEART RATE: 64 BPM | HEIGHT: 63 IN | WEIGHT: 135 LBS

## 2023-07-26 DIAGNOSIS — I10 HYPERTENSION, UNSPECIFIED TYPE: ICD-10-CM

## 2023-07-26 DIAGNOSIS — N39.0 RECURRENT UTI: ICD-10-CM

## 2023-07-26 DIAGNOSIS — E27.40 ADRENAL INSUFFICIENCY: ICD-10-CM

## 2023-07-26 DIAGNOSIS — E16.2 HYPOGLYCEMIA: Primary | ICD-10-CM

## 2023-07-26 DIAGNOSIS — E11.9 TYPE 2 DIABETES MELLITUS WITHOUT COMPLICATION, WITHOUT LONG-TERM CURRENT USE OF INSULIN: ICD-10-CM

## 2023-07-26 DIAGNOSIS — I48.91 ATRIAL FIBRILLATION, UNSPECIFIED TYPE: ICD-10-CM

## 2023-07-26 DIAGNOSIS — E78.5 HYPERLIPIDEMIA, UNSPECIFIED HYPERLIPIDEMIA TYPE: ICD-10-CM

## 2023-07-26 PROCEDURE — 3044F HG A1C LEVEL LT 7.0%: CPT | Performed by: INTERNAL MEDICINE

## 2023-07-26 PROCEDURE — 99204 OFFICE O/P NEW MOD 45 MIN: CPT | Performed by: INTERNAL MEDICINE

## 2023-07-26 NOTE — PROGRESS NOTES
-----------------------------------------------------------------  ENDOCRINE CLINIC NOTE  -----------------------------------------------------------------        PATIENT NAME: Emilee Gandara  PATIENT : 1955 AGE: 67 y.o.  MRN NUMBER: [id number]  PRIMARY CARE: Murray Lowe MD    ==========================================================================    CHIEF COMPLAINT: Hypoglycemia with hx of T2DM  DATE OF SERVICE: 2023         ==========================================================================    HPI / SUBJECTIVE    67 y.o. female seen in the clinic today for evaluation and management of hypoglycemia with history of type 2 diabetes.  Actively denied fever, chills, headache, chest pain, SOB, cough, nausea, vomiting, abdominal pain, diarrhea and focal weakness / numbness. Reports chronic constipation.  Patient reports that she had an episode of feeling lightheadedness, shaking, sweating and presyncopal episode that happened in early  for which patient was admitted to the hospital and episode was possibly attributed to cardiac condition and patient was diagnosed with atrial fibrillation during that time.  Patient had similar episode in early  and she was again admitted to the hospital and there was concern that she was experiencing these episodes because of low blood sugars.  Therefore patient's primary care ordered an oral glucose tolerance test 4 hours which was performed on 2023 and showed evidence of hypoglycemia on blood work.  Patient reports to feel sleepy and drowsy at the time and the blood sugar was significantly low otherwise no active symptoms reported.  Patient reports that she was diagnosed with diabetes type 2 around 24 years ago but it has been well controlled only on metformin therapy.  Denies any present or past use of any antidiabetic medications that includes sulfonylurea.  Patient to have a family member with history of diabetes but none living with  "her at the time of these episodes.  Is any past history of bariatric surgery.  Denied any family or personal history of adrenal problems.  Denies any recent past history of severe infection or abdominal trauma.  Patient do have a history of severe infection when she had infected bowels and underwent colectomy that was around 13 years ago.  Do have a history of chronic recurrent UTIs and requiring frequent antibiotic therapy for the last 3 years.    ==========================================================================    REVIEW OF SYSTEMS    Constitutional:  Denies fever or chills or tiredness  Eyes: Denies change in vision  HEENT:  Denies headache, sore throat or nasal congestion  Cardiovascular:  Denies chest pain, palpitation or shortness of breath  Respiratory:  Denies cough, shortness of breath or sputum  Gastrointestinal:  Chronic constipation  Genitourinary:  Denies dysuria or polyuria or hematuria  Musculoskeletal:  Denies any active muscle pain or bone pain  Neurologic:  Denies any focal weakness or numbness  Endocrine:  Please see HPI  Skin:  Denies any rash or skin breakdown    ==========================================================================                                                PAST MEDICAL HISTORY    Past Medical History:   Diagnosis Date    Anxiety     Depression     Diabetes mellitus     Hyperlipidemia     Hypertension     Lichen planus     MOUTH - CAUSES SORES, WEBBING AND INFLAMATION    Pancreatic ductal abnormality     \"swollen\"    Recurrent UTI     Tachycardia        ==========================================================================    PAST SURGICAL HISTORY    Past Surgical History:   Procedure Laterality Date    COLON SURGERY  2003    PART OF COLON/REMOVED    COLONOSCOPY N/A 01/17/2023    Procedure: COLONOSCOPY with polypectomy x5 and biopsy x1 area;  Surgeon: George Scott MD;  Location: [facility] ENDOSCOPY;  Service: Gastroenterology;  Laterality: N/A;  colon " polyps.melanosis,abnormal rectal mucosa    ESOPHAGOSCOPY W/ DILATION      TOTAL ABDOMINAL HYSTERECTOMY  1987       ==========================================================================    FAMILY HISTORY    Family History   Problem Relation Age of Onset    Atrial fibrillation Mother     Heart disease Mother     Hypertension Sister     Breast cancer Paternal Aunt 54    Ovarian cancer Maternal Grandmother 80    Cancer Other     Atrial fibrillation Daughter        ==========================================================================    SOCIAL HISTORY    Social History     Socioeconomic History    Marital status:      Spouse name: Carlo Gandara    Number of children: 4    Years of education: 12    Highest education level: Some college, no degree   Tobacco Use    Smoking status: Never     Passive exposure: Never    Smokeless tobacco: Never   Vaping Use    Vaping Use: Never used   Substance and Sexual Activity    Alcohol use: No    Drug use: No    Sexual activity: Defer       ==========================================================================    MEDICATIONS      Current Outpatient Medications:     acetaminophen (TYLENOL) 325 MG tablet, Take 2 tablets by mouth Every 6 (Six) Hours As Needed for Mild Pain., Disp: , Rfl:     atenolol (TENORMIN) 25 MG tablet, Take 2 tablets by mouth Daily., Disp: 180 tablet, Rfl: 3    Biotin 1000 MCG tablet, Take 1,000 mcg by mouth Daily., Disp: , Rfl:     COLLAGEN PO, Take 1 tablet by mouth Daily., Disp: , Rfl:     DULoxetine (CYMBALTA) 60 MG capsule, Take 1 capsule by mouth Daily., Disp: , Rfl:     escitalopram (LEXAPRO) 10 MG tablet, Take 1 tablet by mouth Daily., Disp: , Rfl:     hydroxychloroquine (PLAQUENIL) 200 MG tablet, Take 1 tablet by mouth Daily., Disp: , Rfl:     linaclotide (LINZESS) 145 MCG capsule capsule, Take 1 capsule by mouth Daily., Disp: , Rfl:     magnesium chloride ER 64 MG DR tablet, Take 1 tablet by mouth Daily., Disp: , Rfl:     meloxicam  (MOBIC) 15 MG tablet, Take 1 tablet by mouth Daily., Disp: , Rfl:     metFORMIN (GLUCOPHAGE) 500 MG tablet, Take 1 tablet by mouth 2 (Two) Times a Day With Meals., Disp: , Rfl:     multivitamin with minerals tablet tablet, Take 1 tablet by mouth Daily., Disp: , Rfl:     naproxen sodium (ALEVE) 220 MG tablet, Take 1 tablet by mouth 2 (Two) Times a Day As Needed., Disp: , Rfl:     omeprazole (priLOSEC) 40 MG capsule, Take 1 capsule by mouth Daily., Disp: , Rfl:     pregabalin (LYRICA) 75 MG capsule, Take 1 capsule by mouth 2 (Two) Times a Day., Disp: , Rfl:     Probiotic Product (UP4 PROBIOTICS PO), Take 1 tablet by mouth Daily., Disp: , Rfl:     traMADol (ULTRAM) 50 MG tablet, Take 1 tablet by mouth Every 8 (Eight) Hours As Needed for Moderate Pain., Disp: , Rfl:     Vitamin D, Cholecalciferol, (CHOLECALCIFEROL) 10 MCG (400 UNIT) tablet, Take 1 tablet by mouth Daily., Disp: , Rfl:     atorvastatin (LIPITOR) 40 MG tablet, Take 1 tablet by mouth Daily. (Patient not taking: Reported on 7/26/2023), Disp: , Rfl:     ==========================================================================    ALLERGIES    Allergies   Allergen Reactions    Sulfa Antibiotics Rash       ==========================================================================    OBJECTIVE    Vitals:    07/26/23 1318   BP: 138/80   Pulse: 64   SpO2: 99%     Body mass index is 23.91 kg/m².     General: Alert, cooperative, no acute distress  Lungs: Clear to auscultation bilaterally, respirations unlabored  Heart: Regular rate and rhythm, S1 and S2 normal, no murmur, rub or gallop  Abdomen: Soft, NT, ND and Bowel sounds Positive  Extremities:  Extremities normal, atraumatic, no cyanosis or edema    ==========================================================================    LAB EVALUATION    Lab Results   Component Value Date    GLUCOSE 104 (H) 07/11/2023    BUN 15 07/11/2023    CREATININE 0.55 (L) 07/11/2023    EGFRIFNONA >60 08/01/2022    BCR 27.3 (H)  07/11/2023    K 4.2 07/11/2023    CO2 25.0 07/11/2023    CALCIUM 8.7 07/11/2023    ALBUMIN 4.4 07/10/2023    LABIL2 1.5 07/06/2022    AST 24 07/10/2023    ALT 22 07/10/2023    CHOL 146 01/09/2020    TRIG 126 06/25/2020    HDL 62 06/25/2020    LDL 82 06/25/2020     Lab Results   Component Value Date    HGBA1C 6.10 (H) 07/11/2023    HGBA1C 6.7 (H) 12/02/2022    HGBA1C 6.3 (H) 05/25/2022     Lab Results   Component Value Date    CREATININE 0.55 (L) 07/11/2023     Lab Results   Component Value Date    TSH 1.730 07/10/2023    FREET4 0.75 07/24/2019     OGTT  7/21/2023  Fasting - 110  1 Hour - 250  2 Hour - 221  3 Hour - 134  4 Hour - 59    ==========================================================================    ASSESSMENT AND PLAN    Assessment:  #Postprandial hypoglycemia with underlying history of diabetes but not on any oral hypoglycemic agent  #Most likely drug-induced hypoglycemia  #Chronic recurrent UTIs  #Hypertension  #Hyperlipidemia  #Atrial fibrillation    Plan:  - Patient to have clinical evidence of hypoglycemia with OGTT which was positive for glucose of 59, 4 hours after glucose load, suggesting true hypoglycemia  - There is no secondary cause significantly identified as patient have good kidney function, no evidence of liver dysfunction and no clinical evidence if she has been exposed to any sulfonylurea therapy/insulin secretagogues  - We will check adrenal function in this scenario by a.m. cortisol level to rule out any adrenal insufficiency  - But it seems like patient is experiencing from postprandial hypoglycemia and possible culprit at this time may be medical cause of medications that includes but not limited to multiple antibiotics that include Cipro and Bactrim also can be secondary to Plaquenil therapy or pregabalin therapy  - Counseled patient to have more complex diet with low carbohydrates and rich in protein and lipids to prevent such episodes  - We will also refer patient to dietitian  "services to further enhance and discuss about complex diets with patient  - This findings were discussed with patient in detail to which she verbalized understanding, will follow as as needed otherwise, we will review cortisol level and if needed we will plan follow-up  - We will also research a little bit more about her condition and if there is any significant evaluation needed will plan accordingly  -There is possibility of using acarbose but patient do have a history of IBS which is predominantly constipation but do have episodes of diarrhea as well and by using acarbose I can exacerbate diarrheal episodes for her therefore we will first do dietary changes prior to trying any acarbose therapy    Thank you for courtesy of consultation.    Return to clinic: PRN    Entire assessment and plan was discussed and counseled the patient in detail to which patient verbalized understanding and agreed with care.  Answered all queries and concerns.    This note was created using voice recognition software and is inherently subject to errors including those of syntax and \"sound-alike\" substitutions which may escape proofreading.  In such instances, original meaning may be extrapolated by contextual derivation.    ==========================================================================    INFORMATION PROVIDED TO PATIENT    Patient Instructions   Please,    - Consume complex diet that includes low carbohydrates and rich in protein and lipids.  - Get lab work done in the morning, fasting before 8:00.  - Make an appoint with dietitian.    Follow as needed.      ==========================================================================  Franklin Tsang MD  Department of Endocrine, Diabetes and Metabolism  [facility], [place]  ==========================================================================    "

## 2023-07-26 NOTE — PATIENT INSTRUCTIONS
Please,    - Consume complex diet that includes low carbohydrates and rich in protein and lipids.  - Get lab work done in the morning, fasting before 8:00.  - Make an appoint with dietitian.    Follow as needed.

## 2023-07-28 ENCOUNTER — TELEPHONE (OUTPATIENT)
Dept: ENDOCRINOLOGY | Facility: CLINIC | Age: 68
End: 2023-07-28
Payer: MEDICARE

## 2023-07-28 ENCOUNTER — LAB (OUTPATIENT)
Dept: LAB | Facility: HOSPITAL | Age: 68
End: 2023-07-28
Payer: MEDICARE

## 2023-07-28 DIAGNOSIS — E16.2 HYPOGLYCEMIA: ICD-10-CM

## 2023-07-28 DIAGNOSIS — E27.40 ADRENAL INSUFFICIENCY: ICD-10-CM

## 2023-07-28 DIAGNOSIS — E11.9 TYPE 2 DIABETES MELLITUS WITHOUT COMPLICATION, WITHOUT LONG-TERM CURRENT USE OF INSULIN: ICD-10-CM

## 2023-07-28 LAB — CORTIS SERPL-MCNC: 12 MCG/DL

## 2023-07-28 PROCEDURE — 36415 COLL VENOUS BLD VENIPUNCTURE: CPT

## 2023-07-28 PROCEDURE — 82533 TOTAL CORTISOL: CPT

## 2023-07-28 RX ORDER — MIGLITOL 25 MG/1
25 TABLET, COATED ORAL DAILY
Qty: 30 TABLET | Refills: 3 | Status: SHIPPED | OUTPATIENT
Start: 2023-07-28 | End: 2023-07-28 | Stop reason: SDUPTHER

## 2023-07-28 RX ORDER — MIGLITOL 25 MG/1
25 TABLET, COATED ORAL DAILY
Qty: 90 TABLET | Refills: 1 | Status: SHIPPED | OUTPATIENT
Start: 2023-07-28 | End: 2023-11-25

## 2023-07-28 NOTE — TELEPHONE ENCOUNTER
Specialty Pharmacy Patient Management Program       Emilee Gandara is a 67 y.o. female seen by an Endocrinology provider for Type 2 Diabetes and enrolled in the Endocrinology Patient Management program offered by [facility].      Called to check on the price and for 30 days it is $12. Patient requested a 90 day prescription.     Requested Prescriptions     Signed Prescriptions Disp Refills    miglitol (GLYSET) 25 MG tablet 90 tablet 1     Sig: Take 1 tablet by mouth Daily for 120 days.     Authorizing Provider: MEÑO TSANG     Ordering User: SAMEER GREER       Refill sent in to patient's pharmacy for above medication prescribed by Dr. Hernandez.   Last office visit 7/26/23.  Next visit scheduled Follow up as needed per Dr. Tsang.    Sameer Greer, PharmD  Clinical Specialty Pharmacist, Endocrinology  7/28/2023  12:09 EDT

## 2023-07-28 NOTE — PROGRESS NOTES
UPDATE:    - Discussed with patient and will try Miglitrol 25 mgs once a day and d/c metformin to prevent GI side-effect.  - Follow up in 3 months and plan for escalation if tolerated and possibly repeating OGTT.  - Patient verbalized understanding of benefit and side-effects.    Franklin Tsang MD  09:33 EDT  07/28/23

## 2023-08-01 ENCOUNTER — OFFICE (AMBULATORY)
Dept: URBAN - METROPOLITAN AREA CLINIC 64 | Facility: CLINIC | Age: 68
End: 2023-08-01

## 2023-08-01 VITALS — HEIGHT: 63 IN

## 2023-08-01 DIAGNOSIS — K64.1 SECOND DEGREE HEMORRHOIDS: ICD-10-CM

## 2023-08-01 PROCEDURE — 46221 LIGATION OF HEMORRHOID(S): CPT | Performed by: INTERNAL MEDICINE

## 2023-08-31 ENCOUNTER — OFFICE (AMBULATORY)
Dept: URBAN - METROPOLITAN AREA CLINIC 64 | Facility: CLINIC | Age: 68
End: 2023-08-31

## 2023-08-31 VITALS
WEIGHT: 136 LBS | DIASTOLIC BLOOD PRESSURE: 90 MMHG | HEART RATE: 65 BPM | SYSTOLIC BLOOD PRESSURE: 155 MMHG | HEIGHT: 63 IN

## 2023-08-31 DIAGNOSIS — K59.00 CONSTIPATION, UNSPECIFIED: ICD-10-CM

## 2023-08-31 DIAGNOSIS — K62.5 HEMORRHAGE OF ANUS AND RECTUM: ICD-10-CM

## 2023-08-31 PROCEDURE — 99214 OFFICE O/P EST MOD 30 MIN: CPT | Performed by: INTERNAL MEDICINE

## 2023-08-31 RX ORDER — HYDROCORTISONE ACETATE 25 MG/1
25 SUPPOSITORY RECTAL
Qty: 15 | Refills: 1 | Status: COMPLETED
Start: 2023-08-31 | End: 2024-03-08

## 2023-09-14 ENCOUNTER — OFFICE (AMBULATORY)
Dept: URBAN - METROPOLITAN AREA CLINIC 64 | Facility: CLINIC | Age: 68
End: 2023-09-14

## 2023-09-14 VITALS
SYSTOLIC BLOOD PRESSURE: 130 MMHG | WEIGHT: 136 LBS | DIASTOLIC BLOOD PRESSURE: 77 MMHG | HEART RATE: 61 BPM | HEIGHT: 63 IN

## 2023-09-14 DIAGNOSIS — R10.32 LEFT LOWER QUADRANT PAIN: ICD-10-CM

## 2023-09-14 DIAGNOSIS — Z86.19 PERSONAL HISTORY OF OTHER INFECTIOUS AND PARASITIC DISEASES: ICD-10-CM

## 2023-09-14 DIAGNOSIS — K59.00 CONSTIPATION, UNSPECIFIED: ICD-10-CM

## 2023-09-14 PROCEDURE — 99214 OFFICE O/P EST MOD 30 MIN: CPT | Performed by: INTERNAL MEDICINE

## 2023-09-14 RX ORDER — SORBITOL SOLUTION 70 %
SOLUTION, ORAL MISCELLANEOUS
Qty: 100 | Refills: 0 | Status: COMPLETED
Start: 2023-09-14 | End: 2023-09-25

## 2023-09-14 RX ORDER — ALBENDAZOLE 200 MG/1
TABLET, FILM COATED ORAL
Qty: 2 | Refills: 0 | Status: COMPLETED
Start: 2023-09-14 | End: 2024-03-08

## 2023-09-25 ENCOUNTER — OFFICE (AMBULATORY)
Dept: URBAN - METROPOLITAN AREA CLINIC 64 | Facility: CLINIC | Age: 68
End: 2023-09-25

## 2023-09-25 VITALS
SYSTOLIC BLOOD PRESSURE: 153 MMHG | HEART RATE: 64 BPM | HEIGHT: 63 IN | WEIGHT: 134 LBS | DIASTOLIC BLOOD PRESSURE: 85 MMHG

## 2023-09-25 DIAGNOSIS — M79.18 MYALGIA, OTHER SITE: ICD-10-CM

## 2023-09-25 DIAGNOSIS — K59.00 CONSTIPATION, UNSPECIFIED: ICD-10-CM

## 2023-09-25 PROCEDURE — 99213 OFFICE O/P EST LOW 20 MIN: CPT | Mod: 25 | Performed by: INTERNAL MEDICINE

## 2023-09-25 PROCEDURE — 20552 NJX 1/MLT TRIGGER POINT 1/2: CPT | Performed by: INTERNAL MEDICINE

## 2023-10-17 ENCOUNTER — OFFICE VISIT (OUTPATIENT)
Dept: CARDIOLOGY | Facility: CLINIC | Age: 68
End: 2023-10-17
Payer: MEDICARE

## 2023-10-17 VITALS
OXYGEN SATURATION: 96 % | HEART RATE: 83 BPM | HEIGHT: 63 IN | SYSTOLIC BLOOD PRESSURE: 133 MMHG | DIASTOLIC BLOOD PRESSURE: 74 MMHG | BODY MASS INDEX: 23.65 KG/M2 | WEIGHT: 133.5 LBS

## 2023-10-17 DIAGNOSIS — E78.00 PURE HYPERCHOLESTEROLEMIA: ICD-10-CM

## 2023-10-17 DIAGNOSIS — R00.0 TACHYCARDIA: Primary | ICD-10-CM

## 2023-10-17 PROCEDURE — 99213 OFFICE O/P EST LOW 20 MIN: CPT | Performed by: INTERNAL MEDICINE

## 2023-10-17 PROCEDURE — 1159F MED LIST DOCD IN RCRD: CPT | Performed by: INTERNAL MEDICINE

## 2023-10-17 PROCEDURE — 1160F RVW MEDS BY RX/DR IN RCRD: CPT | Performed by: INTERNAL MEDICINE

## 2023-10-17 RX ORDER — ATORVASTATIN CALCIUM 40 MG/1
TABLET, FILM COATED ORAL
COMMUNITY
Start: 2023-08-13

## 2023-10-17 NOTE — PROGRESS NOTES
"Progress note      Name: Emilee Gandara ADMIT: (Not on file)   : 1955  PCP: Murray Lowe MD    MRN: [id number] LOS: 0 days   AGE/SEX: 68 y.o. female  ROOM: Room/bed info not found     Chief Complaint   Patient presents with    Follow-up     6MTH       Subjective       History of present illness  Emilee Gandara is a 68-year-old female patient was diabetes, dyslipidemia, atrial tachycardia diagnosed in 2022, here today for follow-up.  She is doing well denies any significant palpitations, no chest pain, no shortness of breath.    Past Medical History:   Diagnosis Date    Anxiety     Depression     Diabetes mellitus     Hyperlipidemia     Hypertension     Lichen planus     MOUTH - CAUSES SORES, WEBBING AND INFLAMATION    Pancreatic ductal abnormality     \"swollen\"    Recurrent UTI     Tachycardia      Past Surgical History:   Procedure Laterality Date    COLON SURGERY      PART OF COLON/REMOVED    COLONOSCOPY N/A 2023    Procedure: COLONOSCOPY with polypectomy x5 and biopsy x1 area;  Surgeon: George Scott MD;  Location: [facility] ENDOSCOPY;  Service: Gastroenterology;  Laterality: N/A;  colon polyps.melanosis,abnormal rectal mucosa    ESOPHAGOSCOPY W/ DILATION      TOTAL ABDOMINAL HYSTERECTOMY       Family History   Problem Relation Age of Onset    Atrial fibrillation Mother     Heart disease Mother     Hypertension Sister     Breast cancer Paternal Aunt 54    Ovarian cancer Maternal Grandmother 80    Cancer Other     Atrial fibrillation Daughter      Social History     Tobacco Use    Smoking status: Never     Passive exposure: Never    Smokeless tobacco: Never   Vaping Use    Vaping Use: Never used   Substance Use Topics    Alcohol use: No    Drug use: No       Current Outpatient Medications:     acetaminophen (TYLENOL) 325 MG tablet, Take 2 tablets by mouth Every 6 (Six) Hours As Needed for Mild Pain., Disp: , Rfl:     atenolol (TENORMIN) 25 MG tablet, Take 2 tablets by mouth " Daily., Disp: 180 tablet, Rfl: 3    atorvastatin (LIPITOR) 40 MG tablet, , Disp: , Rfl:     Biotin 1000 MCG tablet, Take 1,000 mcg by mouth Daily., Disp: , Rfl:     COLLAGEN PO, Take 1 tablet by mouth Daily., Disp: , Rfl:     DULoxetine (CYMBALTA) 60 MG capsule, Take 1 capsule by mouth Daily., Disp: , Rfl:     escitalopram (LEXAPRO) 10 MG tablet, Take 1 tablet by mouth Daily., Disp: , Rfl:     hydroxychloroquine (PLAQUENIL) 200 MG tablet, Take 1 tablet by mouth Daily., Disp: , Rfl:     linaclotide (LINZESS) 145 MCG capsule capsule, Take 1 capsule by mouth Daily., Disp: , Rfl:     magnesium chloride ER 64 MG DR tablet, Take 1 tablet by mouth Daily., Disp: , Rfl:     meloxicam (MOBIC) 15 MG tablet, Take 1 tablet by mouth Daily., Disp: , Rfl:     miglitol (GLYSET) 25 MG tablet, Take 1 tablet by mouth Daily for 120 days., Disp: 90 tablet, Rfl: 1    multivitamin with minerals tablet tablet, Take 1 tablet by mouth Daily., Disp: , Rfl:     naproxen sodium (ALEVE) 220 MG tablet, Take 1 tablet by mouth 2 (Two) Times a Day As Needed., Disp: , Rfl:     omeprazole (priLOSEC) 40 MG capsule, Take 1 capsule by mouth Daily., Disp: , Rfl:     pregabalin (LYRICA) 75 MG capsule, Take 1 capsule by mouth 2 (Two) Times a Day., Disp: , Rfl:     Probiotic Product (UP4 PROBIOTICS PO), Take 1 tablet by mouth Daily., Disp: , Rfl:     traMADol (ULTRAM) 50 MG tablet, Take 1 tablet by mouth Every 8 (Eight) Hours As Needed for Moderate Pain., Disp: , Rfl:     Vitamin D, Cholecalciferol, (CHOLECALCIFEROL) 10 MCG (400 UNIT) tablet, Take 1 tablet by mouth Daily., Disp: , Rfl:   Allergies:  Sulfa antibiotics      Physical Exam  VITALS REVIEWED    General:      well developed, in no acute distress.    Head:      normocephalic and atraumatic.    Eyes:      PERRL/EOM intact, conjunctiva and sclera clear with out nystagmus.    Neck:      no masses, thyromegaly,  trachea central with normal respiratory effort and PMI displaced laterally  Lungs:      Clear  to auscultation bilaterally  Heart:       Regular rate and rhythm  Msk:      no deformity or scoliosis noted of thoracic or lumbar spine.    Pulses:      pulses normal in all 4 extremities.    Extremities:       No lower extremity edema  Neurologic:      no focal deficits.   alert oriented x3  Skin:      intact without lesions or rashes.    Psych:      alert and cooperative; normal mood and affect; normal attention span and concentration.      Result Review :               Pertinent cardiac workup    EKG 3/28/2022 atrial tachycardia heart rate of 144 bpm.  Echo 3/29/2022 ejection fraction 60 to 65%  Event monitor 3/29/2022 for 25 days showed sinus rhythm, no A. fib or atrial flutter, short runs of ectopy noted.  Stress test 5/9/2023 low risk EF 63%      Procedures        Assessment and Plan      Emilee Gandara is a 68-year-old female patient with no apparent history of coronary artery disease, she had an episode of atrial tachycardia in March 2022, she was started on atenolol which seems to be controlling it.  There is no evidence of atrial fibrillation based on EKG review as well as event monitors.  No changes during this visit, we will see her in follow-up in 1 year or sooner if she has any issues.    Diagnoses and all orders for this visit:    1. Tachycardia (Primary)    2. Pure hypercholesterolemia           No follow-ups on file.  Patient was given instructions and counseling regarding her condition or for health maintenance advice. Please see specific information pulled into the AVS if appropriate.

## 2023-11-02 ENCOUNTER — APPOINTMENT (OUTPATIENT)
Dept: CT IMAGING | Facility: HOSPITAL | Age: 68
End: 2023-11-02
Payer: MEDICARE

## 2023-11-02 ENCOUNTER — HOSPITAL ENCOUNTER (EMERGENCY)
Facility: HOSPITAL | Age: 68
Discharge: HOME | End: 2023-11-02
Payer: MEDICARE

## 2023-11-02 VITALS
OXYGEN SATURATION: 99 % | BODY MASS INDEX: 23.4 KG/M2 | RESPIRATION RATE: 20 BRPM | HEIGHT: 63 IN | SYSTOLIC BLOOD PRESSURE: 137 MMHG | WEIGHT: 132.06 LBS | HEART RATE: 67 BPM | DIASTOLIC BLOOD PRESSURE: 64 MMHG | TEMPERATURE: 97 F

## 2023-11-02 DIAGNOSIS — R30.0 DYSURIA: ICD-10-CM

## 2023-11-02 DIAGNOSIS — R10.32 LEFT LOWER QUADRANT PAIN: Primary | ICD-10-CM

## 2023-11-02 DIAGNOSIS — K59.00 CONSTIPATION, UNSPECIFIED CONSTIPATION TYPE: ICD-10-CM

## 2023-11-02 LAB
ALBUMIN SERPL-MCNC: 4.4 G/DL (ref 3.5–5.2)
ALBUMIN/GLOB SERPL: 1.6 G/DL
ALP SERPL-CCNC: 81 U/L (ref 39–117)
ALT SERPL W P-5'-P-CCNC: 28 U/L (ref 1–33)
ANION GAP SERPL CALCULATED.3IONS-SCNC: 13 MMOL/L (ref 5–15)
AST SERPL-CCNC: 38 U/L (ref 1–32)
BACTERIA UR QL AUTO: ABNORMAL /HPF
BASOPHILS # BLD AUTO: 0 10*3/MM3 (ref 0–0.2)
BASOPHILS NFR BLD AUTO: 0.2 % (ref 0–1.5)
BILIRUB SERPL-MCNC: 0.6 MG/DL (ref 0–1.2)
BILIRUB UR QL STRIP: NEGATIVE
BUN SERPL-MCNC: 26 MG/DL (ref 8–23)
BUN/CREAT SERPL: 25.2 (ref 7–25)
CALCIUM SPEC-SCNC: 9.9 MG/DL (ref 8.6–10.5)
CHLORIDE SERPL-SCNC: 101 MMOL/L (ref 98–107)
CLARITY UR: CLEAR
CO2 SERPL-SCNC: 24 MMOL/L (ref 22–29)
COLOR UR: YELLOW
CREAT SERPL-MCNC: 1.03 MG/DL (ref 0.57–1)
DEPRECATED RDW RBC AUTO: 43.3 FL (ref 37–54)
EGFRCR SERPLBLD CKD-EPI 2021: 58.6 ML/MIN/1.73
EOSINOPHIL # BLD AUTO: 0.1 10*3/MM3 (ref 0–0.4)
EOSINOPHIL NFR BLD AUTO: 0.7 % (ref 0.3–6.2)
ERYTHROCYTE [DISTWIDTH] IN BLOOD BY AUTOMATED COUNT: 14.8 % (ref 12.3–15.4)
GLOBULIN UR ELPH-MCNC: 2.8 GM/DL
GLUCOSE SERPL-MCNC: 132 MG/DL (ref 65–99)
GLUCOSE UR STRIP-MCNC: NEGATIVE MG/DL
HCT VFR BLD AUTO: 40.8 % (ref 34–46.6)
HGB BLD-MCNC: 13.2 G/DL (ref 12–15.9)
HGB UR QL STRIP.AUTO: NEGATIVE
HOLD SPECIMEN: NORMAL
HYALINE CASTS UR QL AUTO: ABNORMAL /LPF
KETONES UR QL STRIP: ABNORMAL
LEUKOCYTE ESTERASE UR QL STRIP.AUTO: ABNORMAL
LIPASE SERPL-CCNC: 25 U/L (ref 13–60)
LYMPHOCYTES # BLD AUTO: 0.9 10*3/MM3 (ref 0.7–3.1)
LYMPHOCYTES NFR BLD AUTO: 8.9 % (ref 19.6–45.3)
MCH RBC QN AUTO: 27.4 PG (ref 26.6–33)
MCHC RBC AUTO-ENTMCNC: 32.3 G/DL (ref 31.5–35.7)
MCV RBC AUTO: 84.8 FL (ref 79–97)
MONOCYTES # BLD AUTO: 0.3 10*3/MM3 (ref 0.1–0.9)
MONOCYTES NFR BLD AUTO: 3.6 % (ref 5–12)
NEUTROPHILS NFR BLD AUTO: 8.4 10*3/MM3 (ref 1.7–7)
NEUTROPHILS NFR BLD AUTO: 86.6 % (ref 42.7–76)
NITRITE UR QL STRIP: POSITIVE
NRBC BLD AUTO-RTO: 0 /100 WBC (ref 0–0.2)
PH UR STRIP.AUTO: <=5 [PH] (ref 5–8)
PLATELET # BLD AUTO: 201 10*3/MM3 (ref 140–450)
PMV BLD AUTO: 8.9 FL (ref 6–12)
POTASSIUM SERPL-SCNC: 4.5 MMOL/L (ref 3.5–5.2)
PROT SERPL-MCNC: 7.2 G/DL (ref 6–8.5)
PROT UR QL STRIP: ABNORMAL
RBC # BLD AUTO: 4.81 10*6/MM3 (ref 3.77–5.28)
RBC # UR STRIP: ABNORMAL /HPF
REF LAB TEST METHOD: ABNORMAL
SODIUM SERPL-SCNC: 138 MMOL/L (ref 136–145)
SP GR UR STRIP: 1.02 (ref 1–1.03)
SQUAMOUS #/AREA URNS HPF: ABNORMAL /HPF
TRANS CELLS #/AREA URNS HPF: ABNORMAL /HPF
UROBILINOGEN UR QL STRIP: ABNORMAL
WBC # UR STRIP: ABNORMAL /HPF
WBC NRBC COR # BLD: 9.7 10*3/MM3 (ref 3.4–10.8)
WHOLE BLOOD HOLD COAG: NORMAL
WHOLE BLOOD HOLD SPECIMEN: NORMAL

## 2023-11-02 PROCEDURE — 74176 CT ABD & PELVIS W/O CONTRAST: CPT

## 2023-11-02 PROCEDURE — 81001 URINALYSIS AUTO W/SCOPE: CPT | Performed by: NURSE PRACTITIONER

## 2023-11-02 PROCEDURE — 25810000003 SODIUM CHLORIDE 0.9 % SOLUTION: Performed by: NURSE PRACTITIONER

## 2023-11-02 PROCEDURE — 80053 COMPREHEN METABOLIC PANEL: CPT | Performed by: NURSE PRACTITIONER

## 2023-11-02 PROCEDURE — 87086 URINE CULTURE/COLONY COUNT: CPT | Performed by: NURSE PRACTITIONER

## 2023-11-02 PROCEDURE — 99284 EMERGENCY DEPT VISIT MOD MDM: CPT

## 2023-11-02 PROCEDURE — 25010000002 MORPHINE PER 10 MG: Performed by: NURSE PRACTITIONER

## 2023-11-02 PROCEDURE — 25010000002 ONDANSETRON PER 1 MG: Performed by: NURSE PRACTITIONER

## 2023-11-02 PROCEDURE — 83690 ASSAY OF LIPASE: CPT | Performed by: NURSE PRACTITIONER

## 2023-11-02 PROCEDURE — 85025 COMPLETE CBC W/AUTO DIFF WBC: CPT | Performed by: NURSE PRACTITIONER

## 2023-11-02 PROCEDURE — 96375 TX/PRO/DX INJ NEW DRUG ADDON: CPT

## 2023-11-02 PROCEDURE — 96374 THER/PROPH/DIAG INJ IV PUSH: CPT

## 2023-11-02 RX ORDER — ONDANSETRON 4 MG/1
4 TABLET, ORALLY DISINTEGRATING ORAL EVERY 8 HOURS PRN
Qty: 20 TABLET | Refills: 0 | Status: SHIPPED | OUTPATIENT
Start: 2023-11-02

## 2023-11-02 RX ORDER — SODIUM CHLORIDE 0.9 % (FLUSH) 0.9 %
10 SYRINGE (ML) INJECTION AS NEEDED
Status: DISCONTINUED | OUTPATIENT
Start: 2023-11-02 | End: 2023-11-02 | Stop reason: HOSPADM

## 2023-11-02 RX ORDER — ONDANSETRON 2 MG/ML
4 INJECTION INTRAMUSCULAR; INTRAVENOUS ONCE
Status: COMPLETED | OUTPATIENT
Start: 2023-11-02 | End: 2023-11-02

## 2023-11-02 RX ORDER — MORPHINE SULFATE 2 MG/ML
2 INJECTION, SOLUTION INTRAMUSCULAR; INTRAVENOUS ONCE
Status: COMPLETED | OUTPATIENT
Start: 2023-11-02 | End: 2023-11-02

## 2023-11-02 RX ADMIN — MORPHINE SULFATE 2 MG: 2 INJECTION, SOLUTION INTRAMUSCULAR; INTRAVENOUS at 15:26

## 2023-11-02 RX ADMIN — SODIUM CHLORIDE 500 ML: 9 INJECTION, SOLUTION INTRAVENOUS at 15:26

## 2023-11-02 RX ADMIN — ONDANSETRON 4 MG: 2 INJECTION INTRAMUSCULAR; INTRAVENOUS at 15:26

## 2023-11-02 NOTE — DISCHARGE INSTRUCTIONS
Medications as directed.  5 with your family doctor or urology.  Increase your fiber water intake may use over-the-counter Colace or MiraLAX.  Return for any new or worsening symptoms

## 2023-11-02 NOTE — ED PROVIDER NOTES
"Subjective   History of Present Illness  Chief complaint: Left lower quadrant pain      Context: Patient is 68-year-old female who comes in with complaints of left lower quadrant pain for the last couple days with dysuria.  Family doctor started her on Levaquin 2 days ago without obtaining urine sample as she has a history of frequent UTIs and this felt very similar.  Noted some nausea this morning.  No fever.  Also reports a history of diverticulitis.  Has had some constipation and had a very small bowel movement yesterday.  No fever.      PCP:josue        Review of Systems   Constitutional:  Negative for fever.       Past Medical History:   Diagnosis Date    Anxiety     Depression     Diabetes mellitus     Hyperlipidemia     Hypertension     Lichen planus     MOUTH - CAUSES SORES, WEBBING AND INFLAMATION    Pancreatic ductal abnormality     \"swollen\"    Recurrent UTI     Tachycardia        Allergies   Allergen Reactions    Sulfa Antibiotics Rash       Past Surgical History:   Procedure Laterality Date    COLON SURGERY  2003    PART OF COLON/REMOVED    COLONOSCOPY N/A 01/17/2023    Procedure: COLONOSCOPY with polypectomy x5 and biopsy x1 area;  Surgeon: George Scott MD;  Location: [facility] ENDOSCOPY;  Service: Gastroenterology;  Laterality: N/A;  colon polyps.melanosis,abnormal rectal mucosa    ESOPHAGOSCOPY W/ DILATION      TOTAL ABDOMINAL HYSTERECTOMY  1987       Family History   Problem Relation Age of Onset    Atrial fibrillation Mother     Heart disease Mother     Hypertension Sister     Breast cancer Paternal Aunt 54    Ovarian cancer Maternal Grandmother 80    Cancer Other     Atrial fibrillation Daughter        Social History     Socioeconomic History    Marital status:      Spouse name: Carlo Gandara    Number of children: 4    Years of education: 12    Highest education level: Some college, no degree   Tobacco Use    Smoking status: Never     Passive exposure: Never    Smokeless tobacco: " Never   Vaping Use    Vaping Use: Never used   Substance and Sexual Activity    Alcohol use: No    Drug use: No    Sexual activity: Defer           Objective   Physical Exam    Due to significant overcrowding in the emergency department patient was evaluated by myself in a hallway bed.  Explained to the patient our limitations due to overcrowding and they were in agreement to continue exam and treatment at this time.     Vital signs and triage nurse note reviewed.  Constitutional: Awake, alert; well-developed and well-nourished. No acute distress is noted.  Spouse at bedside  HEENT: Normocephalic, atraumatic;  with intact EOM; oropharynx is pink and moist without exudate or erythema.  Neck: Supple, full range of motion without pain;   Cardiovascular: Regular rate and rhythm, normal S1-S2.  Pulmonary: Respiratory effort regular nonlabored, breath sounds clear to auscultation all fields.  Abdomen: Soft, tender left lower quadrant without peritonitis nondistended with normoactive bowel sounds; no rebound or guarding.  Musculoskeletal: Independent range of motion of all extremities with no palpable tenderness or edema.  Neuro: Alert oriented x3, speech is clear and appropriate, GCS 15  Skin:  Fleshtone warm, dry, intact;        Procedures           ED Course      Labs Reviewed   URINALYSIS W/ CULTURE IF INDICATED - Abnormal; Notable for the following components:       Result Value    Ketones, UA 15 mg/dL (1+) (*)     Protein, UA Trace (*)     Leuk Esterase, UA Small (1+) (*)     Nitrite, UA Positive (*)     All other components within normal limits    Narrative:     In absence of clinical symptoms, the presence of pyuria, bacteria, and/or nitrites on the urinalysis result does not correlate with infection.   COMPREHENSIVE METABOLIC PANEL - Abnormal; Notable for the following components:    Glucose 132 (*)     BUN 26 (*)     Creatinine 1.03 (*)     AST (SGOT) 38 (*)     BUN/Creatinine Ratio 25.2 (*)     eGFR 58.6 (*)      All other components within normal limits    Narrative:     GFR Normal >60  Chronic Kidney Disease <60  Kidney Failure <15     CBC WITH AUTO DIFFERENTIAL - Abnormal; Notable for the following components:    Neutrophil % 86.6 (*)     Lymphocyte % 8.9 (*)     Monocyte % 3.6 (*)     Neutrophils, Absolute 8.40 (*)     All other components within normal limits   URINALYSIS, MICROSCOPIC ONLY - Abnormal; Notable for the following components:    WBC, UA 11-20 (*)     Squamous Epithelial Cells, UA 3-6 (*)     All other components within normal limits   LIPASE - Normal   URINE CULTURE   RAINBOW DRAW    Narrative:     The following orders were created for panel order [place] Draw.  Procedure                               Abnormality         Status                     ---------                               -----------         ------                     Green Top (Gel)[[id number]]                                  Final result               Lavender Top[[id number]]                                     Final result               Gold Top - SST[[id number]]                                   Final result               Light Blue Top[[id number]]                                   Final result                 Please view results for these tests on the individual orders.   GREEN TOP   LAVENDER TOP   GOLD TOP - SST   LIGHT BLUE TOP   CBC AND DIFFERENTIAL    Narrative:     The following orders were created for panel order CBC & Differential.  Procedure                               Abnormality         Status                     ---------                               -----------         ------                     CBC Auto Differential[[id number]]        Abnormal            Final result                 Please view results for these tests on the individual orders.     Medications   sodium chloride 0.9 % flush 10 mL (has no administration in time range)   sodium chloride 0.9 % flush 10 mL (has no administration in time range)   ondansetron  (ZOFRAN) injection 4 mg (4 mg Intravenous Given 11/2/23 1526)   morphine injection 2 mg (2 mg Intravenous Given 11/2/23 1526)   sodium chloride 0.9 % bolus 500 mL (0 mL Intravenous Stopped 11/2/23 1625)     CT Abdomen Pelvis Without Contrast    Result Date: 11/2/2023  Impression: 1. No definite acute findings in the abdomen or pelvis 2. Suspected constipation, please correlate clinically Electronically Signed: John Joshua MD  11/2/2023 4:04 PM EDT  Workstation ID: [id number]   Prior to Admission medications    Medication Sig Start Date End Date Taking? Authorizing Provider   acetaminophen (TYLENOL) 325 MG tablet Take 2 tablets by mouth Every 6 (Six) Hours As Needed for Mild Pain.    Chito Henao MD   atenolol (TENORMIN) 25 MG tablet Take 2 tablets by mouth Daily. 9/28/22   ChemchiriJerry saaverda MD   atorvastatin (LIPITOR) 40 MG tablet  8/13/23   Chito Henao MD   Biotin 1000 MCG tablet Take 1,000 mcg by mouth Daily.    Chito Henao MD   COLLAGEN PO Take 1 tablet by mouth Daily.    Chito Henao MD   DULoxetine (CYMBALTA) 60 MG capsule Take 1 capsule by mouth Daily.    Chito Henao MD   escitalopram (LEXAPRO) 10 MG tablet Take 1 tablet by mouth Daily. 8/25/22   Chito Henao MD   hydroxychloroquine (PLAQUENIL) 200 MG tablet Take 1 tablet by mouth Daily.    Chito Henao MD   linaclotide (LINZESS) 145 MCG capsule capsule Take 1 capsule by mouth Daily. 7/26/22   Chito Hneao MD   magnesium chloride ER 64 MG DR tablet Take 1 tablet by mouth Daily.    Emergency, Nurse Epic, RN   meloxicam (MOBIC) 15 MG tablet Take 1 tablet by mouth Daily.    Chito Henao MD   miglitol (GLYSET) 25 MG tablet Take 1 tablet by mouth Daily for 120 days. 7/28/23 11/25/23  Franklin Tsang MD   multivitamin with minerals tablet tablet Take 1 tablet by mouth Daily.    Chito Henao MD   naproxen sodium (ALEVE) 220 MG tablet Take 1 tablet by mouth 2 (Two) Times a Day  "As Needed.    Chito Henao MD   omeprazole (priLOSEC) 40 MG capsule Take 1 capsule by mouth Daily. 6/1/20   Chito Henao MD   ondansetron ODT (ZOFRAN-ODT) 4 MG disintegrating tablet Place 1 tablet on the tongue Every 8 (Eight) Hours As Needed for Nausea. 11/2/23   Joaquina Bello APRN   pregabalin (LYRICA) 75 MG capsule Take 1 capsule by mouth 2 (Two) Times a Day. 4/3/23   Chito Henao MD   Probiotic Product (UP4 PROBIOTICS PO) Take 1 tablet by mouth Daily.    Chito Henao MD   traMADol (ULTRAM) 50 MG tablet Take 1 tablet by mouth Every 8 (Eight) Hours As Needed for Moderate Pain.    Chito Henao MD   Vitamin D, Cholecalciferol, (CHOLECALCIFEROL) 10 MCG (400 UNIT) tablet Take 1 tablet by mouth Daily.    Chito Henao MD                                          Medical Decision Making      /64 (BP Location: Left arm, Patient Position: Lying)   Pulse 67   Temp 97 °F (36.1 °C) (Oral)   Resp 20   Ht 160 cm (63\")   Wt 59.9 kg (132 lb 0.9 oz)   SpO2 99%   BMI 23.39 kg/m²      Chart review:    Radiology interpretation:  X-rays reviewed independently and interpreted by me:  Further interpretation by radiologist as above  Lab interpretation:  Labs all viewed by me and significant for, UTI with culture pending, glucose 132, white count 9.7          Appropriate PPE worn during exam.  Patient was seen in a dominguez bed due to hospital census.  Had an IV established labs CT and urine obtained evaluate for pyelonephritis UTI electrode abnormalities diverticulitis.  Was given analgesia and antiemetics.  On reexam patient is resting comfortably no acute distress.  She is already on Levaquin x2 doses and will be given prescription for Zofran.  Instructed follow-up with urology or PCP         i discussed findings with patient who voices understanding of discharge instructions, signs and symptoms requiring return to ED; discharged improved and in stable condition with " follow up for re-evaluation.  This document is intended for medical expert use only. Reading of this document by patients and/or patient's family without participating medical staff guidance may result in misinterpretation and unintended morbidity.  Any interpretation of such data is the responsibility of the patient and/or family member responsible for the patient in concert with their primary or specialist providers, not to be left for sources of online searches such as [employer], [employer] or similar queries. Relying on these approaches to knowledge may result in misinterpretation, misguided goals of care and even death should patients or family members try recommendations outside of the realm of professional medical care in a supervised inpatient environment.         Problems Addressed:  Constipation, unspecified constipation type: complicated acute illness or injury  Dysuria: complicated acute illness or injury  Left lower quadrant pain: complicated acute illness or injury    Amount and/or Complexity of Data Reviewed  Labs: ordered.  Radiology: ordered.    Risk  Prescription drug management.        Final diagnoses:   Left lower quadrant pain   Dysuria   Constipation, unspecified constipation type       ED Disposition  ED Disposition       ED Disposition   Discharge    Condition   Stable    Comment   --               Murray Lowe MD  [address] [place] [address] [phone]    Schedule an appointment as soon as possible for a visit       Lino Rendon MD  [address] [place] [address] [place] [address] [phone]      urology         Medication List        New Prescriptions      ondansetron ODT 4 MG disintegrating tablet  Commonly known as: ZOFRAN-ODT  Place 1 tablet on the tongue Every 8 (Eight) Hours As Needed for Nausea.               Where to Get Your Medications        These medications were sent to [id number] [place]  [address] [address] AT [facility]  [address] - [phone]  - [phone]   [address] [place] [address]      Phone: [phone]   ondansetron ODT 4 MG disintegrating tablet            Joaquina Bello, APRN  11/02/23 9538

## 2023-11-03 LAB — BACTERIA SPEC AEROBE CULT: NO GROWTH

## 2023-12-18 ENCOUNTER — HOSPITAL ENCOUNTER (EMERGENCY)
Facility: HOSPITAL | Age: 68
Discharge: HOME | End: 2023-12-18
Attending: EMERGENCY MEDICINE | Admitting: EMERGENCY MEDICINE
Payer: MEDICARE

## 2023-12-18 ENCOUNTER — APPOINTMENT (OUTPATIENT)
Dept: GENERAL RADIOLOGY | Facility: HOSPITAL | Age: 68
End: 2023-12-18
Payer: MEDICARE

## 2023-12-18 VITALS
OXYGEN SATURATION: 99 % | HEIGHT: 63 IN | WEIGHT: 137.35 LBS | SYSTOLIC BLOOD PRESSURE: 135 MMHG | TEMPERATURE: 97.6 F | DIASTOLIC BLOOD PRESSURE: 70 MMHG | HEART RATE: 65 BPM | RESPIRATION RATE: 16 BRPM | BODY MASS INDEX: 24.34 KG/M2
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
## 2023-12-18 DIAGNOSIS — S70.01XA CONTUSION OF RIGHT HIP, INITIAL ENCOUNTER: ICD-10-CM

## 2023-12-18 DIAGNOSIS — M53.3 SACRO-ILIAC PAIN: ICD-10-CM

## 2023-12-18 PROCEDURE — 99282 EMERGENCY DEPT VISIT SF MDM: CPT

## 2023-12-18 PROCEDURE — 97162 PT EVAL MOD COMPLEX 30 MIN: CPT | Performed by: PHYSICAL THERAPIST

## 2023-12-18 PROCEDURE — 73552 X-RAY EXAM OF FEMUR 2/>: CPT

## 2023-12-18 PROCEDURE — 73502 X-RAY EXAM HIP UNI 2-3 VIEWS: CPT

## 2023-12-18 NOTE — PLAN OF CARE
ASSESSMENT:   Pt is a 68 yr/o female presenting with (R) hip pain after a fall over a week ago. Pain has been worsening. Xray was negative for fx. She was positive for SIJ anterior ilial rotation and outlare on the right worse than left. All were manually corrected during exam and pt had decreased pain after, however still remained with soreness around bruised area on lateral tight. Issued HEP for posterior ilial rotation and gentle stretching. Ed that SIJ was likely compromised with fall and soreness may be present for extended period of time. OPPT referral for SIJ stability and improved hip / low back pain.       PLAN:    HEP and OPPT

## 2023-12-18 NOTE — DISCHARGE INSTRUCTIONS
Ice to the area of discomfort 20 minutes of time symptoms today    Follow PT instructions, follow-up outpatient    Follow-up with PCP    Return to the ER for new or worsening symptoms

## 2023-12-18 NOTE — ED NOTES
Pt reports she had a fall last week-she tripped in water and over a chair hurting her RLE at that time. Pt did not get evaluated or have any xrays of RLE done at that time. Pt reports continued pain-bruising noted to rt lateral thigh. Pt denies hitting head or LOC at time of fall.

## 2023-12-18 NOTE — ED PROVIDER NOTES
"Subjective   History of Present Illness  Chief Complaint: Fall, right hip and leg pain      HPI: Patient is a 68-year-old female who presents by private vehicle states approximately 1 week ago she was watering her plants when she spilled water on the floor causing her to slip she fell over a chair and landed on her right hip.  She has had persistent pain in her right pelvis hip and leg worse with weightbearing.  She has been ambulating since the fall but was concerned as the pain has not improved.    PCP: Chrissy    History provided by:  Patient      Review of Systems   Musculoskeletal:  Positive for arthralgias.   Skin:  Positive for color change.       Past Medical History:   Diagnosis Date    Anxiety     Depression     Diabetes mellitus     Hyperlipidemia     Hypertension     Lichen planus     MOUTH - CAUSES SORES, WEBBING AND INFLAMATION    Pancreatic ductal abnormality     \"swollen\"    Recurrent UTI     Tachycardia        Allergies   Allergen Reactions    Sulfa Antibiotics Rash       Past Surgical History:   Procedure Laterality Date    COLON SURGERY  2003    PART OF COLON/REMOVED    COLONOSCOPY N/A 01/17/2023    Procedure: COLONOSCOPY with polypectomy x5 and biopsy x1 area;  Surgeon: George Scott MD;  Location: [facility] ENDOSCOPY;  Service: Gastroenterology;  Laterality: N/A;  colon polyps.melanosis,abnormal rectal mucosa    ESOPHAGOSCOPY W/ DILATION      TOTAL ABDOMINAL HYSTERECTOMY  1987       Family History   Problem Relation Age of Onset    Atrial fibrillation Mother     Heart disease Mother     Hypertension Sister     Breast cancer Paternal Aunt 54    Ovarian cancer Maternal Grandmother 80    Cancer Other     Atrial fibrillation Daughter        Social History     Socioeconomic History    Marital status:      Spouse name: Carlo Gandara    Number of children: 4    Years of education: 12    Highest education level: Some college, no degree   Tobacco Use    Smoking status: Never     Passive " "exposure: Never    Smokeless tobacco: Never   Vaping Use    Vaping Use: Never used   Substance and Sexual Activity    Alcohol use: No    Drug use: No    Sexual activity: Defer           Objective   Physical Exam  Vitals reviewed.   HENT:      Head: Normocephalic.   Eyes:      Extraocular Movements: Extraocular movements intact.      Pupils: Pupils are equal, round, and reactive to light.   Cardiovascular:      Rate and Rhythm: Normal rate.      Heart sounds: Normal heart sounds.   Pulmonary:      Effort: Pulmonary effort is normal.   Musculoskeletal:         General: Normal range of motion.        Legs:       Comments: Ecchymosis noted to the lateral aspect of the right thigh,   Pain on palpation  Distal neurovascular intact, negative straight leg  Pinpoint pain just above the right hip   Skin:     General: Skin is warm and dry.      Findings: Bruising present.   Neurological:      General: No focal deficit present.      Mental Status: She is alert and oriented to person, place, and time.         Procedures           ED Course      /70   Pulse 65   Temp 97.6 °F (36.4 °C) (Oral)   Resp 16   Ht 160 cm (63\")   Wt 62.3 kg (137 lb 5.6 oz)   SpO2 99%   BMI 24.33 kg/m²   Labs Reviewed - No data to display  Medications - No data to display  XR Femur 2 View Right    Result Date: 12/18/2023  Impression: No acute osseous injury to the right femur. Electronically Signed: Lucretia Pham MD  12/18/2023 9:21 AM CST  Workstation ID: [id number]    XR Hip With or Without Pelvis 2 - 3 View Right    Result Date: 12/18/2023  1.No evidence for displaced fracture or dislocation. If there is continued inability to bear weight on the hip, correlation with CT or MRI would be helpful for better characterization to exclude a radiographically occult fracture as clinically indicated. 2.Mild degenerative changes are noted. Mild changes of osteoarthritis involving the right hip. Electronically Signed: Jeffery Schneider MD  12/18/2023 " 10:18 AM EST  Workstation ID: [id number]                                          Medical Decision Making  Patient presented with above complaints x-ray was obtained of the hip as well as the femur both of which were negative for fracture or dislocation per my interpretation see ED course for official read.  Physical therapy at bedside aided in evaluation, recommended outpatient physical therapy treatment she also gave her at home exercises to complete.  We discussed contusion, nonpharmacological treatment of his pain as well as over-the-counter pain medications.  We discussed worrisome symptoms to monitor for when to return to the emergency room, she gave verbal understanding of plan of care.  She was also given the phone number for orthopedist to follow-up with if she feels that her pain is not improving with physical therapy exercises.  She was alert oriented ambulatory, denied further questions or complaints at time of discharge.    Chart review: 10/17/2023 outpatient visit with Dr. Zacarias related to pure hypercholesterolemia, tachycardia      Note Disclaimer: At [facility], we believe that sharing information builds trust and better  relationships. You are receiving this note because you recently visited [facility]. It is possible you will see health information before a provider has talked with you about it. This kind of information can be easy to misunderstand. To help you fully understand what it means for your health, we urge you to discuss this note with your provider.       Part of this note may be an electronic transcription/translation of spoken language to printed text using the Dragon Dictation System.    Appropriate PPE worn during exam.    Problems Addressed:  Contusion of right hip, initial encounter: complicated acute illness or injury  Fall, initial encounter: complicated acute illness or injury    Amount and/or Complexity of Data Reviewed  External Data Reviewed: notes.  Radiology:  ordered and independent interpretation performed. Decision-making details documented in ED Course.        Final diagnoses:   Fall, initial encounter   Contusion of right hip, initial encounter       ED Disposition  ED Disposition       ED Disposition   Discharge    Condition   Stable    Comment   --               Murray Lowe MD  [address]  [address] [address] [phone]          [facility]  [address]  [facility] [address]  [phone]  Call in 1 week           Medication List      No changes were made to your prescriptions during this visit.            Brittany Alvarez, APRN  12/18/23 1816

## 2023-12-18 NOTE — THERAPY EVALUATION
"Patient Name: Emilee Gandara  : 1955    MRN: [id number]                              Today's Date: 2023       Admit Date: 2023    Visit Dx:     ICD-10-CM ICD-9-CM   1. Fall, initial encounter  W19.XXXA E888.9   2. Contusion of right hip, initial encounter  S70.01XA 924.01   3. Sacro-iliac pain  M53.3 724.6     Patient Active Problem List   Diagnosis    Abdominal adhesions    Lumbosacral radiculopathy    Abdominal hernia    Abdominal pain, LLQ    Acute back pain with sciatica    Urinary tract infection without hematuria    Anxiety disorder    Atopic dermatitis, mild    Bursitis, hip    Carotid arterial disease    Carpal tunnel syndrome    Chronic constipation    Degeneration of intervertebral disc of lumbar region    Arthritis    Degenerative arthritis    Depression    Encounter for general adult medical examination without abnormal findings    Family history of colon cancer    Fibromyalgia    Fibromyositis    Hyperlipidemia    Hypothyroidism    Other abnormal and inconclusive findings on diagnostic imaging of breast    Recurrent UTI    Seasonal allergies    SVT (supraventricular tachycardia)    Vaginal atrophy    Tachycardia    Near syncope    Type 2 diabetes mellitus, without long-term current use of insulin     Past Medical History:   Diagnosis Date    Anxiety     Depression     Diabetes mellitus     Hyperlipidemia     Hypertension     Lichen planus     MOUTH - CAUSES SORES, WEBBING AND INFLAMATION    Pancreatic ductal abnormality     \"swollen\"    Recurrent UTI     Tachycardia      Past Surgical History:   Procedure Laterality Date    COLON SURGERY      PART OF COLON/REMOVED    COLONOSCOPY N/A 2023    Procedure: COLONOSCOPY with polypectomy x5 and biopsy x1 area;  Surgeon: George Scott MD;  Location: [facility] ENDOSCOPY;  Service: Gastroenterology;  Laterality: N/A;  colon polyps.melanosis,abnormal rectal mucosa    ESOPHAGOSCOPY W/ DILATION      TOTAL ABDOMINAL HYSTERECTOMY   "     SUBJECTIVE:  Pt fell last Saturday after getting her foot caught in a chair as she was getting up and the next thing she knew she was on the ground. Has had worsening hip pain instead of improving pain so she wanted to come get it xrayed.     OBJECTIVE:    AROM - decreased hip ext, all other hip ROM WFL   PROM - painful passive hip IR  MMT LE - 4/5 grossly  SPECIAL TESTING   Scour - negative    FRACISCO - negative    FADIR - negative      SIJ TESTING   Compression - negative    Distraction - negative    Gillettes - positive (R)    Posterior ilial rotation - positive (R)    Anterior ilial rotation - inc pain    Outflare - present (B)   SENSATION - normal       ASSESSMENT:   Pt is a 68 yr/o female presenting with (R) hip pain after a fall over a week ago. Pain has been worsening. Xray was negative for fx. She was positive for SIJ anterior ilial rotation and outlare on the right worse than left. All were manually corrected during exam and pt had decreased pain after, however still remained with soreness around bruised area on lateral tight. Issued HEP for posterior ilial rotation and gentle stretching. Ed that SIJ was likely compromised with fall and soreness may be present for extended period of time. OPPT referral for SIJ stability and improved hip / low back pain.       PLAN:    HEP and OPPT      Time Calculation:   PT Evaluation Complexity  History, PT Evaluation Complexity: 1-2 personal factors and/or comorbidities  Examination of Body Systems (PT Eval Complexity): total of 3 or more elements  Clinical Presentation (PT Evaluation Complexity): evolving  Clinical Decision Making (PT Evaluation Complexity): moderate complexity  Overall Complexity (PT Evaluation Complexity): moderate complexity     PT Charges       Row Name 12/18/23 1331             Time Calculation    Start Time 1045  -AD      Stop Time 1115  -AD      Time Calculation (min) 30 min  -AD      PT Received On 12/18/23  -AD         Time Calculation- PT     Total Timed Code Minutes- PT 0 minute(s)  -GOLD                User Key  (r) = Recorded By, (t) = Taken By, (c) = Cosigned By      Initials Name Provider Type    AD Maddi Juárez, PT Physical Therapist                  Therapy Charges for Today       Code Description Service Date Service Provider Modifiers Qty    [id number]  PT EVAL MOD COMPLEXITY 4 12/18/2023 Maddi Juárez, PT GP 1               PT Discharge Summary  Anticipated Discharge Disposition (PT): home with outpatient therapy services    Maddi Juárez PT  12/18/2023

## 2023-12-19 RX ORDER — MIGLITOL 25 MG/1
25 TABLET, COATED ORAL DAILY
Qty: 90 TABLET | Refills: 3 | Status: SHIPPED | OUTPATIENT
Start: 2023-12-19

## 2024-01-23 ENCOUNTER — OFFICE VISIT (OUTPATIENT)
Dept: ENDOCRINOLOGY | Facility: CLINIC | Age: 69
End: 2024-01-23
Payer: MEDICARE

## 2024-01-23 VITALS
SYSTOLIC BLOOD PRESSURE: 115 MMHG | BODY MASS INDEX: 24.45 KG/M2 | HEART RATE: 90 BPM | HEIGHT: 63 IN | WEIGHT: 138 LBS | DIASTOLIC BLOOD PRESSURE: 68 MMHG

## 2024-01-23 DIAGNOSIS — I48.91 ATRIAL FIBRILLATION, UNSPECIFIED TYPE: ICD-10-CM

## 2024-01-23 DIAGNOSIS — E78.5 HYPERLIPIDEMIA, UNSPECIFIED HYPERLIPIDEMIA TYPE: ICD-10-CM

## 2024-01-23 DIAGNOSIS — I10 HYPERTENSION, UNSPECIFIED TYPE: ICD-10-CM

## 2024-01-23 DIAGNOSIS — E16.2 HYPOGLYCEMIA: ICD-10-CM

## 2024-01-23 DIAGNOSIS — E11.9 TYPE 2 DIABETES MELLITUS WITHOUT COMPLICATION, WITHOUT LONG-TERM CURRENT USE OF INSULIN: Primary | ICD-10-CM

## 2024-01-23 DIAGNOSIS — N39.0 RECURRENT UTI: ICD-10-CM

## 2024-01-23 LAB — GLUCOSE BLDC GLUCOMTR-MCNC: 90 MG/DL (ref 70–105)

## 2024-01-23 PROCEDURE — 82948 REAGENT STRIP/BLOOD GLUCOSE: CPT | Performed by: INTERNAL MEDICINE

## 2024-01-23 PROCEDURE — 1160F RVW MEDS BY RX/DR IN RCRD: CPT | Performed by: INTERNAL MEDICINE

## 2024-01-23 PROCEDURE — 99214 OFFICE O/P EST MOD 30 MIN: CPT | Performed by: INTERNAL MEDICINE

## 2024-01-23 PROCEDURE — 1159F MED LIST DOCD IN RCRD: CPT | Performed by: INTERNAL MEDICINE

## 2024-01-23 RX ORDER — CEPHALEXIN 500 MG/1
1 CAPSULE ORAL
COMMUNITY

## 2024-01-23 RX ORDER — AMOXICILLIN 500 MG/1
CAPSULE ORAL
COMMUNITY
Start: 2024-01-02

## 2024-01-23 RX ORDER — METHENAMINE HIPPURATE 1000 MG/1
1 TABLET ORAL 2 TIMES DAILY WITH MEALS
COMMUNITY

## 2024-01-23 RX ORDER — HYOSCYAMINE SULFATE 0.12 MG/1
TABLET SUBLINGUAL
COMMUNITY

## 2024-01-23 RX ORDER — TRIAMCINOLONE ACETONIDE 1 MG/G
CREAM TOPICAL
COMMUNITY

## 2024-01-23 RX ORDER — ONDANSETRON 4 MG/1
TABLET, FILM COATED ORAL
COMMUNITY

## 2024-01-23 RX ORDER — HYDROCORTISONE ACETATE 25 MG/1
SUPPOSITORY RECTAL
COMMUNITY

## 2024-01-23 RX ORDER — NIRMATRELVIR AND RITONAVIR 300-100 MG
KIT ORAL
COMMUNITY
Start: 2024-01-13

## 2024-01-23 RX ORDER — LEVOFLOXACIN 750 MG/1
1 TABLET, FILM COATED ORAL DAILY
COMMUNITY

## 2024-01-23 RX ORDER — PANTOPRAZOLE SODIUM 40 MG/1
TABLET, DELAYED RELEASE ORAL
COMMUNITY

## 2024-01-23 RX ORDER — DICYCLOMINE HCL 20 MG
TABLET ORAL
COMMUNITY

## 2024-01-23 RX ORDER — CELECOXIB 100 MG/1
1 CAPSULE ORAL 2 TIMES DAILY
COMMUNITY

## 2024-01-23 RX ORDER — FLUCONAZOLE 200 MG/1
1 TABLET ORAL WEEKLY
COMMUNITY

## 2024-01-23 RX ORDER — BLOOD-GLUCOSE METER
1 KIT MISCELLANEOUS EVERY OTHER DAY
Qty: 1 EACH | Refills: 0 | Status: SHIPPED | OUTPATIENT
Start: 2024-01-23

## 2024-01-23 RX ORDER — DOXYCYCLINE 100 MG/1
1 TABLET ORAL 2 TIMES DAILY
COMMUNITY

## 2024-01-23 RX ORDER — NITROFURANTOIN MACROCRYSTALS 50 MG/1
1 CAPSULE ORAL DAILY
COMMUNITY

## 2024-01-23 RX ORDER — ALBENDAZOLE 200 MG/1
TABLET, FILM COATED ORAL
COMMUNITY

## 2024-01-23 RX ORDER — LANCETS 30 GAUGE
1 EACH MISCELLANEOUS EVERY OTHER DAY
Qty: 100 EACH | Refills: 5 | Status: SHIPPED | OUTPATIENT
Start: 2024-01-23

## 2024-01-23 RX ORDER — PHENAZOPYRIDINE HYDROCHLORIDE 100 MG/1
TABLET, FILM COATED ORAL
COMMUNITY

## 2024-01-23 RX ORDER — GRANULES FOR ORAL 3 G/1
POWDER ORAL
COMMUNITY

## 2024-01-23 RX ORDER — ESTRADIOL 0.1 MG/G
CREAM VAGINAL
COMMUNITY
Start: 2023-12-19

## 2024-01-23 RX ORDER — METRONIDAZOLE 500 MG/1
1 TABLET ORAL EVERY 6 HOURS
COMMUNITY

## 2024-01-23 RX ORDER — PHENAZOPYRIDINE HYDROCHLORIDE 200 MG/1
1 TABLET, FILM COATED ORAL 3 TIMES DAILY
COMMUNITY

## 2024-01-23 RX ORDER — TRIAMCINOLONE ACETONIDE 55 UG/1
1 SPRAY, METERED NASAL DAILY
COMMUNITY

## 2024-01-23 RX ORDER — DIPHENOXYLATE HYDROCHLORIDE AND ATROPINE SULFATE 2.5; .025 MG/1; MG/1
TABLET ORAL
COMMUNITY

## 2024-01-23 RX ORDER — NITROFURANTOIN 25; 75 MG/1; MG/1
1 CAPSULE ORAL 2 TIMES DAILY
COMMUNITY

## 2024-01-23 RX ORDER — CYCLOBENZAPRINE HCL 10 MG
TABLET ORAL
COMMUNITY

## 2024-01-23 NOTE — PROGRESS NOTES
"    -----------------------------------------------------------------  ENDOCRINE CLINIC NOTE  -----------------------------------------------------------------        PATIENT NAME: Emilee Gandara  PATIENT : 1955 AGE: 68 y.o.  MRN NUMBER: [id number]  PRIMARY CARE: Murray Lowe MD    ==========================================================================    CHIEF COMPLAINT: Hypoglycemia with hx of T2DM  DATE OF SERVICE: 24     ==========================================================================    HPI / SUBJECTIVE    68 y.o. female seen in the clinic today follow-up for reactive hypoglycemia.  Patient is currently taking Glyset therapy and reports that since she is taking Glyset there have been no hypoglycemic episodes.  Otherwise feel intermittent fatigue but no other active complaints.  History of bowel infection leading to colectomy around 13 years ago.  History of chronic recurrent UTIs and requiring frequent antibiotic therapy, currently on chronic suppression.    ==========================================================================                                                PAST MEDICAL HISTORY    Past Medical History:   Diagnosis Date    Anxiety     Depression     Diabetes mellitus     Hyperlipidemia     Hypertension     Lichen planus     MOUTH - CAUSES SORES, WEBBING AND INFLAMATION    Pancreatic ductal abnormality     \"swollen\"    Recurrent UTI     Tachycardia        ==========================================================================    PAST SURGICAL HISTORY    Past Surgical History:   Procedure Laterality Date    COLON SURGERY      PART OF COLON/REMOVED    COLONOSCOPY N/A 2023    Procedure: COLONOSCOPY with polypectomy x5 and biopsy x1 area;  Surgeon: George Scott MD;  Location: [facility] ENDOSCOPY;  Service: Gastroenterology;  Laterality: N/A;  colon polyps.melanosis,abnormal rectal mucosa    ESOPHAGOSCOPY W/ DILATION      TOTAL ABDOMINAL HYSTERECTOMY  " 1987       ==========================================================================    FAMILY HISTORY    Family History   Problem Relation Age of Onset    Atrial fibrillation Mother     Heart disease Mother     Hypertension Sister     Breast cancer Paternal Aunt 54    Ovarian cancer Maternal Grandmother 80    Cancer Other     Atrial fibrillation Daughter        ==========================================================================    SOCIAL HISTORY    Social History     Socioeconomic History    Marital status:      Spouse name: Carlo Gandara    Number of children: 4    Years of education: 12    Highest education level: Some college, no degree   Tobacco Use    Smoking status: Never     Passive exposure: Never    Smokeless tobacco: Never   Vaping Use    Vaping Use: Never used   Substance and Sexual Activity    Alcohol use: No    Drug use: No    Sexual activity: Defer       ==========================================================================    MEDICATIONS      Current Outpatient Medications:     acetaminophen (TYLENOL) 325 MG tablet, Take 2 tablets by mouth Every 6 (Six) Hours As Needed for Mild Pain., Disp: , Rfl:     albendazole (ALBENZA) 200 MG tablet, TAKE 2 TABLETS BY MOUTH AS A SINGLE DOSE, Disp: , Rfl:     amoxicillin (AMOXIL) 500 MG capsule, take 1 capsule by mouth three times daily until gone, Disp: , Rfl:     atenolol (TENORMIN) 25 MG tablet, Take 2 tablets by mouth Daily., Disp: 180 tablet, Rfl: 3    atorvastatin (LIPITOR) 40 MG tablet, , Disp: , Rfl:     Biotin 1000 MCG tablet, Take 1,000 mcg by mouth Daily., Disp: , Rfl:     celecoxib (CeleBREX) 100 MG capsule, Take 1 capsule by mouth 2 (Two) Times a Day., Disp: , Rfl:     cephalexin (KEFLEX) 500 MG capsule, Take 1 capsule by mouth every night at bedtime., Disp: , Rfl:     COLLAGEN PO, Take 1 tablet by mouth Daily., Disp: , Rfl:     cyclobenzaprine (FLEXERIL) 10 MG tablet, Take 1 tablet every day by oral route at bedtime., Disp: , Rfl:      dicyclomine (BENTYL) 20 MG tablet, Take by oral route., Disp: , Rfl:     diphenoxylate-atropine (LOMOTIL) 2.5-0.025 MG per tablet, , Disp: , Rfl:     doxycycline (ADOXA) 100 MG tablet, Take 1 tablet by mouth 2 (Two) Times a Day., Disp: , Rfl:     DULoxetine (CYMBALTA) 60 MG capsule, Take 1 capsule by mouth Daily., Disp: , Rfl:     escitalopram (LEXAPRO) 10 MG tablet, Take 1 tablet by mouth Daily., Disp: , Rfl:     estradiol (ESTRACE) 0.1 MG/GM vaginal cream, APPLY BLUEBERRY SIZE AMOUNT TO INSIDE OF VAGINA THREE TIMES A WEEK., Disp: , Rfl:     fluconazole (DIFLUCAN) 200 MG tablet, Take 1 tablet by mouth 1 (One) Time Per Week., Disp: , Rfl:     fosfomycin (Monurol) 3 g pack, , Disp: , Rfl:     hydrocortisone (ANUSOL-HC) 25 MG suppository, INSERT 1 SUPPOSITORY INTO RECTUM AT BEDTIME FOR 10 DAYS, Disp: , Rfl:     hydroxychloroquine (PLAQUENIL) 200 MG tablet, Take 1 tablet by mouth Daily., Disp: , Rfl:     Hyoscyamine Sulfate SL 0.125 MG sublingual tablet, , Disp: , Rfl:     levoFLOXacin (LEVAQUIN) 750 MG tablet, Take 1 tablet by mouth Daily., Disp: , Rfl:     magnesium chloride ER 64 MG DR tablet, Take 1 tablet by mouth Daily., Disp: , Rfl:     meloxicam (MOBIC) 15 MG tablet, Take 1 tablet by mouth Daily., Disp: , Rfl:     metFORMIN (GLUCOPHAGE) 500 MG tablet, Take by oral route., Disp: , Rfl:     methenamine (HIPREX) 1 g tablet, Take 1 tablet by mouth 2 (Two) Times a Day With Meals., Disp: , Rfl:     metroNIDAZOLE (FLAGYL) 500 MG tablet, Take 1 tablet by mouth Every 6 (Six) Hours., Disp: , Rfl:     multivitamin with minerals tablet tablet, Take 1 tablet by mouth Daily., Disp: , Rfl:     naproxen sodium (ALEVE) 220 MG tablet, Take 1 tablet by mouth 2 (Two) Times a Day As Needed., Disp: , Rfl:     nitrofurantoin (MACRODANTIN) 50 MG capsule, Take 1 capsule by mouth Daily., Disp: , Rfl:     nitrofurantoin, macrocrystal-monohydrate, (MACROBID) 100 MG capsule, Take 1 capsule by mouth 2 (Two) Times a Day., Disp: , Rfl:      omeprazole (priLOSEC) 40 MG capsule, Take 1 capsule by mouth Daily., Disp: , Rfl:     ondansetron (ZOFRAN) 4 MG tablet, , Disp: , Rfl:     ondansetron ODT (ZOFRAN-ODT) 4 MG disintegrating tablet, Place 1 tablet on the tongue Every 8 (Eight) Hours As Needed for Nausea., Disp: 20 tablet, Rfl: 0    pantoprazole (PROTONIX) 40 MG EC tablet, , Disp: , Rfl:     Paxlovid, 300/100, 20 x 150 MG & 10 x 100MG tablet therapy pack tablet, TK 2 NIRMATRELVIR TS AND 1 RITONAVIR T TOGETHER PO TWICE DAILY FOR 5 DAYS, Disp: , Rfl:     phenazopyridine (PYRIDIUM) 100 MG tablet, Take by oral route., Disp: , Rfl:     phenazopyridine (PYRIDIUM) 200 MG tablet, Take 1 tablet by mouth 3 (Three) Times a Day., Disp: , Rfl:     Probiotic Product (UP4 PROBIOTICS PO), Take 1 tablet by mouth Daily., Disp: , Rfl:     traMADol (ULTRAM) 50 MG tablet, Take 1 tablet by mouth Every 8 (Eight) Hours As Needed for Moderate Pain., Disp: , Rfl:     triamcinolone (KENALOG) 0.1 % cream, , Disp: , Rfl:     Triamcinolone Acetonide (Nasacort Allergy 24HR) 55 MCG/ACT nasal inhaler, 1 spray by Each Nare route Daily., Disp: , Rfl:     glucose blood test strip, 1 each by Other route Every Other Day., Disp: 100 each, Rfl: 5    glucose monitor monitoring kit, Use 1 each Every Other Day., Disp: 1 each, Rfl: 0    Lancets misc, Use 1 each Every Other Day., Disp: 100 each, Rfl: 5    linaclotide (LINZESS) 145 MCG capsule capsule, Take 1 capsule by mouth Daily. (Patient not taking: Reported on 1/23/2024), Disp: , Rfl:     ==========================================================================    ALLERGIES    Allergies   Allergen Reactions    Sulfate Unknown - Low Severity    Sulfa Antibiotics Rash       ==========================================================================    OBJECTIVE    Vitals:    01/23/24 1523   BP: 115/68   Pulse: 90     Body mass index is 24.45 kg/m².     General: Alert, cooperative, no acute distress  Lungs: Clear to auscultation bilaterally,  respirations unlabored  Heart: Regular rate and rhythm, S1 and S2 normal, no murmur, rub or gallop  Abdomen: Soft, NT, ND and Bowel sounds Positive  Extremities:  Extremities normal, atraumatic, no cyanosis or edema    ==========================================================================    LAB EVALUATION    Lab Results   Component Value Date    GLUCOSE 132 (H) 11/02/2023    BUN 26 (H) 11/02/2023    CREATININE 1.03 (H) 11/02/2023    EGFRIFNONA >60 08/01/2022    EGFRIFAFRI >60 09/20/2022    BCR 25.2 (H) 11/02/2023    K 4.5 11/02/2023    CO2 24.0 11/02/2023    CALCIUM 9.9 11/02/2023    ALBUMIN 4.4 11/02/2023    LABIL2 1.5 07/06/2022    AST 38 (H) 11/02/2023    ALT 28 11/02/2023    CHOL 146 01/09/2020    TRIG 126 06/25/2020    HDL 62 06/25/2020    LDL 82 06/25/2020     Lab Results   Component Value Date    HGBA1C 6.10 (H) 07/11/2023    HGBA1C 6.7 (H) 12/02/2022    HGBA1C 6.3 (H) 05/25/2022     Lab Results   Component Value Date    CREATININE 1.03 (H) 11/02/2023     Lab Results   Component Value Date    TSH 1.730 07/10/2023    FREET4 0.75 07/24/2019     OGTT  7/21/2023  Fasting - 110  1 Hour - 250  2 Hour - 221  3 Hour - 134  4 Hour - 59    ==========================================================================    ASSESSMENT AND PLAN    # Reactive hypoglycemia with T2DM  # Chronic recurrent UTIs  # Hypertension  # Hyperlipidemia  # Atrial fibrillation    Plan:  - Patient is feeling stable with Glyset therapy  - Detailed counseling done and educated about reactive hypoglycemia to which patient verbalized understanding, provided prescription for blood glucose monitoring at home around twice a week and fasting state  - Will continue Glyset therapy  - Repeat A1c ordered  - Given fatigue complaints will check TSH and free T4 levels  - Clinic back again in 1 year time    Return to clinic: PRN    Entire assessment and plan was discussed and counseled the patient in detail to which patient verbalized understanding and  "agreed with care.  Answered all queries and concerns.    This note was created using voice recognition software and is inherently subject to errors including those of syntax and \"sound-alike\" substitutions which may escape proofreading.  In such instances, original meaning may be extrapolated by contextual derivation.    ==========================================================================    INFORMATION PROVIDED TO PATIENT    Patient Instructions   Please,    - New Glyset 25 mg 1 pill by mouth daily.  - Get repeat blood work done today.  - Check your blood sugar around twice a week at home in a fasting state and maintain log.  - Otherwise follow-up in my clinic back again in 1 year time with repeat A1c.  - If there is any questions or concerns you can make an earlier follow-up appointment as well.    Thank you for your visit today.    If you have any questions or concerns please feel free to reach out of the office.       ==========================================================================  Franklin Tsang MD  Department of Endocrine, Diabetes and Metabolism  [facility], [place]  ==========================================================================    "

## 2024-01-23 NOTE — PATIENT INSTRUCTIONS
Please,    - New Glyset 25 mg 1 pill by mouth daily.  - Get repeat blood work done today.  - Check your blood sugar around twice a week at home in a fasting state and maintain log.  - Otherwise follow-up in my clinic back again in 1 year time with repeat A1c.  - If there is any questions or concerns you can make an earlier follow-up appointment as well.    Thank you for your visit today.    If you have any questions or concerns please feel free to reach out of the office.

## 2024-01-24 ENCOUNTER — LAB (OUTPATIENT)
Dept: LAB | Facility: HOSPITAL | Age: 69
End: 2024-01-24
Payer: MEDICARE

## 2024-01-24 DIAGNOSIS — E11.9 TYPE 2 DIABETES MELLITUS WITHOUT COMPLICATION, WITHOUT LONG-TERM CURRENT USE OF INSULIN: ICD-10-CM

## 2024-01-24 LAB
HBA1C MFR BLD: 6.9 % (ref 4.8–5.6)
T4 FREE SERPL-MCNC: 1.08 NG/DL (ref 0.93–1.7)
TSH SERPL DL<=0.05 MIU/L-ACNC: 0.78 UIU/ML (ref 0.27–4.2)

## 2024-01-24 PROCEDURE — 84443 ASSAY THYROID STIM HORMONE: CPT

## 2024-01-24 PROCEDURE — 84439 ASSAY OF FREE THYROXINE: CPT

## 2024-01-24 PROCEDURE — 36415 COLL VENOUS BLD VENIPUNCTURE: CPT

## 2024-01-24 PROCEDURE — 83036 HEMOGLOBIN GLYCOSYLATED A1C: CPT

## 2024-01-26 ENCOUNTER — TELEPHONE (OUTPATIENT)
Dept: ENDOCRINOLOGY | Facility: CLINIC | Age: 69
End: 2024-01-26

## 2024-01-26 NOTE — TELEPHONE ENCOUNTER
Caller: Emilee Gandara    Relationship: Self    Best call back number: [phone]     Requested Prescriptions:   glucose monitor monitoring kit  1 each, Every Other Day        glucose blood test strip  1 each, Every Other Day     Lancets misc  1 each, Every Other Day     Pharmacy where request should be sent:  [id number] [place] [address] AT [facility] - [phone] [facility] [phone]  [phone]    Last office visit with prescribing clinician: 1/23/2024   Last telemedicine visit with prescribing clinician: Visit date not found   Next office visit with prescribing clinician: 1/22/2025     Additional details provided by patient: THE PTS INSURANCE NEED A NEW PRESCRIPTION FOR A 90 DAY SUPPLY, THEY WONT COVER 100 DAYS JUST 90.      Does the patient have less than a 3 day supply:  [] Yes  [x] No    Would you like a call back once the refill request has been completed: [x] Yes [] No    If the office needs to give you a call back, can they leave a voicemail: [x] Yes [] No    Darnell Jolly Rep   01/26/24 09:31 EST

## 2024-03-08 ENCOUNTER — OFFICE (AMBULATORY)
Dept: URBAN - METROPOLITAN AREA CLINIC 64 | Facility: CLINIC | Age: 69
End: 2024-03-08
Payer: OTHER GOVERNMENT

## 2024-03-08 VITALS
DIASTOLIC BLOOD PRESSURE: 82 MMHG | SYSTOLIC BLOOD PRESSURE: 135 MMHG | HEIGHT: 63 IN | HEART RATE: 64 BPM | WEIGHT: 138 LBS

## 2024-03-08 DIAGNOSIS — K62.5 HEMORRHAGE OF ANUS AND RECTUM: ICD-10-CM

## 2024-03-08 DIAGNOSIS — K59.00 CONSTIPATION, UNSPECIFIED: ICD-10-CM

## 2024-03-08 PROCEDURE — 99213 OFFICE O/P EST LOW 20 MIN: CPT

## 2024-03-08 RX ORDER — HYDROCORTISONE ACETATE 25 MG/1
25 SUPPOSITORY RECTAL
Qty: 1 | Refills: 1 | Status: COMPLETED
Start: 2024-03-08 | End: 2024-05-20

## 2024-03-21 ENCOUNTER — TELEPHONE (OUTPATIENT)
Dept: ENDOCRINOLOGY | Facility: CLINIC | Age: 69
End: 2024-03-21
Payer: MEDICARE

## 2024-03-21 DIAGNOSIS — E11.9 TYPE 2 DIABETES MELLITUS WITHOUT COMPLICATION, WITHOUT LONG-TERM CURRENT USE OF INSULIN: Primary | ICD-10-CM

## 2024-03-21 NOTE — TELEPHONE ENCOUNTER
Hub staff attempted to follow warm transfer process and was unsuccessful     Caller: Emilee Gandara    Relationship to patient: Self    Best call back number: [phone]    Patient is needing: [facility] HAS SENT OVER PRIOR AUTH FOR ANAHY AND HAVE NOT HEARD BACK. PT ONLY HAS 7 DAYS LEFT. PLEASE CALL PT TO ADVISE

## 2024-05-20 ENCOUNTER — OFFICE (AMBULATORY)
Dept: URBAN - METROPOLITAN AREA CLINIC 64 | Facility: CLINIC | Age: 69
End: 2024-05-20

## 2024-05-20 VITALS
HEART RATE: 64 BPM | HEIGHT: 63 IN | SYSTOLIC BLOOD PRESSURE: 125 MMHG | DIASTOLIC BLOOD PRESSURE: 78 MMHG | WEIGHT: 132 LBS

## 2024-05-20 DIAGNOSIS — R10.32 LEFT LOWER QUADRANT PAIN: ICD-10-CM

## 2024-05-20 DIAGNOSIS — K62.5 HEMORRHAGE OF ANUS AND RECTUM: ICD-10-CM

## 2024-05-20 DIAGNOSIS — K59.00 CONSTIPATION, UNSPECIFIED: ICD-10-CM

## 2024-05-20 DIAGNOSIS — Z80.0 FAMILY HISTORY OF MALIGNANT NEOPLASM OF DIGESTIVE ORGANS: ICD-10-CM

## 2024-05-20 PROCEDURE — 99214 OFFICE O/P EST MOD 30 MIN: CPT

## 2024-06-18 ENCOUNTER — OFFICE VISIT (OUTPATIENT)
Dept: ORTHOPEDIC SURGERY | Facility: CLINIC | Age: 69
End: 2024-06-18
Payer: MEDICARE

## 2024-06-18 VITALS — WEIGHT: 132 LBS | BODY MASS INDEX: 23.39 KG/M2 | HEIGHT: 63 IN | RESPIRATION RATE: 18 BRPM

## 2024-06-18 DIAGNOSIS — M25.511 ACUTE PAIN OF RIGHT SHOULDER: Primary | ICD-10-CM

## 2024-06-18 PROCEDURE — 99213 OFFICE O/P EST LOW 20 MIN: CPT | Performed by: PHYSICIAN ASSISTANT

## 2024-06-18 PROCEDURE — 1159F MED LIST DOCD IN RCRD: CPT | Performed by: PHYSICIAN ASSISTANT

## 2024-06-18 PROCEDURE — 1160F RVW MEDS BY RX/DR IN RCRD: CPT | Performed by: PHYSICIAN ASSISTANT

## 2024-06-18 NOTE — PROGRESS NOTES
"Emilee is a 68 y.o. year old female presents to [facility]    Chief Complaint   Patient presents with    Right Shoulder - Initial Evaluation, Pain     Pain 4        History of Present Illness  Emilee Gandara is a pleasant 68 y.o. female presents to clinic for evaluation of chronic right shoulder pain. States 2 years ago had a fall and was told she tore rotator cuff tendons, but delayed surgical repair. Reports a fall, again, in 2023 that further exacerbated pain. States when injections wear off she has constant pain. Tx: heating pad, pain management - tramadol, rheumatologist administered steroid injection February 2024,  tylenol, physical therapy, alternate heat/ice, lidocaine patches. Reports pain at night.   Provocative: Reaching behind and overhead lifting.    Diabetic last A1c 6.9 on 1/24/2024    I have reviewed the patient's medical, family, and social history in detail and updated the computerized patient record.    Objective:  Resp 18   Ht 160 cm (63\")   Wt 59.9 kg (132 lb)   BMI 23.38 kg/m²      Physical Exam    Vital signs reviewed.   General: No acute distress.  Eyes: conjunctiva clear  ENT: external ears atraumatic  CV: no peripheral edema  Resp: normal respiratory effort  Skin: no rashes or wounds; normal turgor  Psych: mood and affect appropriate; recent and remote memory intact  Neuro: sensation to light touch intact    MSK Exam    Right shoulder:  Intact skin.  No effusion.  No ecchymosis.  No warmth  Tenderness palpation most notably over the upper trapezius eccentric to the right of the cervical paraspinal musculature  Passive forward flexion 140+, abduction 115+, ER 30  Active IR S1  No pain or weakness with [place] supers spinatus testing  Negative Speed  Belly press 5/5; lift off 5/5  Range of motion grossly intact to the elbow wrist and digits  Radial pulse palpable capillary refill less than 2 seconds all digits  Imaging:  XR Shoulder 2+ View Right " (06/18/2024 09:10)       Assessment:  Diagnoses and all orders for this visit:    Acute pain of right shoulder  -     XR Shoulder 2+ View Right    Plan: Recommend conservative care in the form of PT with dry needling focusing on upper trapezius. Follow up as needed    BMI is within normal parameters. No other follow-up for BMI required.      Follow Up   No follow-ups on file.  Patient was given instructions and counseling regarding her condition or for health maintenance advice. Please see specific information pulled into the AVS if appropriate.     EMR Dragon/Transcription disclaimer:    Much of this encounter note is an electronic transcription/translation of spoken language to printed text.  The electronic translation of spoken language may permit erroneous, or at times, nonsensical words or phrases to be inadvertently transcribed.  Although I have reviewed the note for such errors some may still exist.

## 2024-06-20 ENCOUNTER — PATIENT ROUNDING (BHMG ONLY) (OUTPATIENT)
Dept: ORTHOPEDIC SURGERY | Facility: CLINIC | Age: 69
End: 2024-06-20
Payer: MEDICARE

## 2024-06-20 NOTE — PROGRESS NOTES
A my chart message has been sent to the patient for PATIENT ROUNDING with [facility]

## 2024-07-11 ENCOUNTER — TELEPHONE (OUTPATIENT)
Dept: ORTHOPEDIC SURGERY | Facility: CLINIC | Age: 69
End: 2024-07-11

## 2024-07-11 DIAGNOSIS — E11.9 TYPE 2 DIABETES MELLITUS WITHOUT COMPLICATION, WITHOUT LONG-TERM CURRENT USE OF INSULIN: ICD-10-CM

## 2024-07-11 NOTE — TELEPHONE ENCOUNTER
Caller: PATIENT    Relationship to patient: SELF     Best call back number: [phone]    Patient is needing: PATIENT STATED MR. PANTOJA RECOMMENDED A DRY NEEDLING OFFICE FOR HER SHOULDER AND PATIENT MISPLACED THAT PHONE NUMBER. PATIENT WAS CALLING TO GET THE PHONE NUMBER FOR THE DRY NEEDLING PLACE HE RECOMMEND. PATIENT WAS ALSO WANTING TO KNOW IF MR. PANTOJA HAD ANY OTHER RECOMMENDATIONS FOR PLACES THAT DO DRY NEEDLING. THANK YOU!

## 2024-07-11 NOTE — TELEPHONE ENCOUNTER
ATTEMPTED TO CALL PT. NO ANSWER, VM FULL. WE HAD REFERRED HER TO  [facility] NUMBER [phone]. FOR DRY NEEDLING. THERE ARE OTHER  OFFICES THAT DO THIS. WILL TRY TO CALL PT AGAIN LATER.

## 2024-07-16 ENCOUNTER — ON CAMPUS - OUTPATIENT (AMBULATORY)
Dept: URBAN - METROPOLITAN AREA HOSPITAL 2 | Facility: HOSPITAL | Age: 69
End: 2024-07-16

## 2024-07-16 ENCOUNTER — OFFICE (AMBULATORY)
Dept: URBAN - METROPOLITAN AREA PATHOLOGY 19 | Facility: PATHOLOGY | Age: 69
End: 2024-07-16

## 2024-07-16 ENCOUNTER — OFFICE (AMBULATORY)
Dept: URBAN - METROPOLITAN AREA PATHOLOGY 19 | Facility: PATHOLOGY | Age: 69
End: 2024-07-16
Payer: COMMERCIAL

## 2024-07-16 VITALS
DIASTOLIC BLOOD PRESSURE: 69 MMHG | DIASTOLIC BLOOD PRESSURE: 73 MMHG | RESPIRATION RATE: 13 BRPM | DIASTOLIC BLOOD PRESSURE: 66 MMHG | OXYGEN SATURATION: 98 % | SYSTOLIC BLOOD PRESSURE: 155 MMHG | HEART RATE: 100 BPM | RESPIRATION RATE: 18 BRPM | HEART RATE: 70 BPM | DIASTOLIC BLOOD PRESSURE: 55 MMHG | HEART RATE: 93 BPM | DIASTOLIC BLOOD PRESSURE: 46 MMHG | OXYGEN SATURATION: 99 % | TEMPERATURE: 97.3 F | HEART RATE: 66 BPM | SYSTOLIC BLOOD PRESSURE: 121 MMHG | HEIGHT: 63 IN | SYSTOLIC BLOOD PRESSURE: 134 MMHG | HEART RATE: 54 BPM | SYSTOLIC BLOOD PRESSURE: 165 MMHG | DIASTOLIC BLOOD PRESSURE: 48 MMHG | SYSTOLIC BLOOD PRESSURE: 100 MMHG | SYSTOLIC BLOOD PRESSURE: 103 MMHG | HEART RATE: 67 BPM | RESPIRATION RATE: 17 BRPM | HEART RATE: 69 BPM | OXYGEN SATURATION: 100 % | SYSTOLIC BLOOD PRESSURE: 116 MMHG | DIASTOLIC BLOOD PRESSURE: 63 MMHG | WEIGHT: 125 LBS | HEART RATE: 76 BPM | RESPIRATION RATE: 16 BRPM | SYSTOLIC BLOOD PRESSURE: 115 MMHG

## 2024-07-16 DIAGNOSIS — K62.89 OTHER SPECIFIED DISEASES OF ANUS AND RECTUM: ICD-10-CM

## 2024-07-16 DIAGNOSIS — K63.5 POLYP OF COLON: ICD-10-CM

## 2024-07-16 DIAGNOSIS — K62.6 ULCER OF ANUS AND RECTUM: ICD-10-CM

## 2024-07-16 DIAGNOSIS — K62.5 HEMORRHAGE OF ANUS AND RECTUM: ICD-10-CM

## 2024-07-16 DIAGNOSIS — R19.5 OTHER FECAL ABNORMALITIES: ICD-10-CM

## 2024-07-16 LAB
GI HISTOLOGY: A. SIGMOID COLON: (no result)
GI HISTOLOGY: B. RECTUM: (no result)
GI HISTOLOGY: PDF REPORT: (no result)

## 2024-07-16 PROCEDURE — 88305 TISSUE EXAM BY PATHOLOGIST: CPT | Mod: 26 | Performed by: PATHOLOGY

## 2024-07-16 PROCEDURE — 45380 COLONOSCOPY AND BIOPSY: CPT | Mod: 59 | Performed by: INTERNAL MEDICINE

## 2024-07-16 PROCEDURE — 45385 COLONOSCOPY W/LESION REMOVAL: CPT | Performed by: INTERNAL MEDICINE

## 2024-07-16 RX ORDER — MIGLITOL 25 MG/1
25 TABLET, COATED ORAL DAILY
Qty: 30 TABLET | Refills: 0 | Status: SHIPPED | OUTPATIENT
Start: 2024-07-16

## 2024-08-06 ENCOUNTER — TREATMENT (OUTPATIENT)
Dept: PHYSICAL THERAPY | Facility: CLINIC | Age: 69
End: 2024-08-06
Payer: MEDICARE

## 2024-08-06 DIAGNOSIS — M25.511 ACUTE PAIN OF RIGHT SHOULDER: Primary | ICD-10-CM

## 2024-08-06 PROCEDURE — 97161 PT EVAL LOW COMPLEX 20 MIN: CPT | Performed by: PHYSICAL THERAPIST

## 2024-08-06 PROCEDURE — 97535 SELF CARE MNGMENT TRAINING: CPT | Performed by: PHYSICAL THERAPIST

## 2024-08-06 PROCEDURE — 97110 THERAPEUTIC EXERCISES: CPT | Performed by: PHYSICAL THERAPIST

## 2024-08-06 NOTE — PROGRESS NOTES
" Physical Therapy Initial Evaluation and Plan of Care         [facility], [address] [place] [address]     Patient: Emilee Gandara   : 1955  Diagnosis/ICD-10 Code:  Acute pain of right shoulder [M25.511]  Referring practitioner: Ellis Ray PA-C  Date of Initial Visit: 2024  Today's Date: 2024  Patient seen for 1 sessions           Subjective Questionnaire: QuickDASH: 64% limited      Subjective Evaluation    History of Present Illness  Onset date: 1 yr ago.  Mechanism of injury: Per office visit with Ellis Ray PA-C on 24:  \"Emilee Gandara is a pleasant 68 y.o. female presents to clinic for evaluation of chronic right shoulder pain. States 2 years ago had a fall and was told she tore rotator cuff tendons, but delayed surgical repair. Reports a fall, again, in  that further exacerbated pain. States when injections wear off she has constant pain. Tx: heating pad, pain management - tramadol, rheumatologist administered steroid injection 2024,  tylenol, physical therapy, alternate heat/ice, lidocaine patches. Reports pain at night.   Provocative: Reaching behind and overhead lifting.\"    She states she can't get comfortable laying on her right side to sleep and gets relief with heat at night.  She did fall twice and tore her RC and has OA in her shoulder.    She is taking meds and it is not giving her any relief.          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  Current treatment: physical therapy  Patient Goals  Patient goals for therapy: decreased pain, decreased edema, " increased motion, increased strength, independence with ADLs/IADLs and return to sport/leisure activities  Patient goal: return to [employer] and work in the yard         Precautions: anxiety, depression, DM, anemia, osteoporosis, arthritis    Objective          Postural Observations  Seated posture: fair  Correction of posture: makes symptoms worse    Additional Postural Observation Details  B rounded shoulders and forward head position    Palpation   Left   No palpable tenderness to the levator scapulae, pectoralis minor, supraspinatus and teres minor.   Hypertonic in the upper trapezius.     Right   No palpable tenderness to the supraspinatus and teres minor.   Hypertonic in the levator scapulae, pectoralis minor, teres minor and upper trapezius. Tenderness of the pectoralis minor and upper trapezius.   Trigger point to upper trapezius.     Tenderness     Right Shoulder  Tenderness in the subacromial bursa. No tenderness in the AC joint, acromion and clavicle.     Cervical/Thoracic Screen   Cervical range of motion within normal limits with the following exceptions: B rotation 50 degrees, 42 deg of flexion with mild discomfort, 48 deg of extension, 45 degrees R LF, 36 degrees of R LF with pain in the R UT/lev scap area    Neurological Testing     Sensation     Shoulder   Left Shoulder   Paresthesia: light touch    Right Shoulder   Diminished: Light touch    Comments   Left light touch: hand burning and pins/needles n/t intermittently at night  Right light touch: C5,    Active Range of Motion   Left Shoulder   External rotation BTH: WFL  Internal rotation BTB: WFL    Right Shoulder   External rotation BTH: WFL  Internal rotation BTB: WFL    Strength/Myotome Testing     Left Shoulder     Planes of Motion   Flexion: 4+   Extension: 4+   Abduction: 4+   Internal rotation at 0°: 4+     Isolated Muscles   Upper trapezius: 4+     Right Shoulder     Planes of Motion   Flexion: 4+   Extension: 4+   Abduction: 4+    Internal rotation at 0°: 4+     Isolated Muscles   Upper trapezius: 4+       See Exercise, Manual, and Modality Logs for complete treatment.     Assessment & Plan       Assessment  Impairments: abnormal muscle tone, abnormal or restricted ROM, activity intolerance, impaired physical strength, lacks appropriate home exercise program and pain with function   Functional limitations: carrying objects, lifting, sleeping, pulling, pushing, uncomfortable because of pain, reaching behind back, reaching overhead and unable to perform repetitive tasks   Assessment details: The patient is a 68 y.o. female who presents to physical therapy today for R acute shoulder pain. Upon initial evaluation, the patient demonstrates the following impairments: R UT trigger points, R levator scap trigger point, limited cervical AROM. Due to these impairments, the patient is unable to perform ADLs, sleep without pain. The patient would benefit from skilled PT services to address functional limitations and impairments and to improve patient quality of life.        Goals  Plan Goals: STGs (4 weeks):  1.  Pt will be able to tolerate initial HEP  2.  Pt will report decreased pain to be able to sleep.   3.  Pt will have decreased R UT trigger point.       LTGs (12 weeks):  1.  Pt will be I with HEP  2.  Pt will have no more than 2/10 pain level.  3.  Pt will have increased cervical ROM without pain.     Plan  Therapy options: will be seen for skilled therapy services  Planned modality interventions: cryotherapy, dry needling, high voltage pulsed current (pain management), TENS, thermotherapy (hydrocollator packs), ultrasound and traction  Planned therapy interventions: abdominal trunk stabilization, manual therapy, neuromuscular re-education, postural training, soft tissue mobilization, strengthening, stretching, therapeutic activities, joint mobilization, body mechanics training, flexibility, functional ROM exercises and home exercise  program  Frequency: 2x week  Duration in weeks: 12  Treatment plan discussed with: patient        Visit Diagnoses:    ICD-10-CM ICD-9-CM   1. Acute pain of right shoulder  M25.511 719.41       History # of Personal Factors and/or Comorbidities: MODERATE (1-2)  Examination of Body System(s): # of elements: LOW (1-2)  Clinical Presentation: STABLE   Clinical Decision Making: LOW       Timed:         Manual Therapy:    5     mins  46659;     Therapeutic Exercise:    10     mins  64477;     Neuromuscular Jessica:        mins  12996;    Therapeutic Activity:          mins  88210;     Gait Training:           mins  53731;     Ultrasound:          mins  69951;    Ionto                                   mins   68110  Self Care                       8     mins   83202  Canalith Repos         mins 65714      Un-Timed:  Electrical Stimulation:         mins  32543 ( );  Dry Needling          mins self-pay  Traction          mins 51084  Low Eval     20     Mins  84362  Mod Eval          Mins  99998  High Eval                            Mins  61009  Re-Eval                               mins  62724    Timed Treatment:   23   mins   Total Treatment:     43   mins    PT SIGNATURE: Muriel Pereira PT         Initial Certification  Certification Period: 8/6/2024 thru 11/4/2024  I certify that the therapy services are furnished while this patient is under my care.  The services outlined above are required by this patient, and will be reviewed every 90 days.     PHYSICIAN: Ellis Ray PA-C      DATE:     Please sign and return via fax to [phone].. Thank you, [facility].

## 2024-08-07 ENCOUNTER — TREATMENT (OUTPATIENT)
Dept: PHYSICAL THERAPY | Facility: CLINIC | Age: 69
End: 2024-08-07
Payer: MEDICARE

## 2024-08-07 DIAGNOSIS — M25.511 ACUTE PAIN OF RIGHT SHOULDER: Primary | ICD-10-CM

## 2024-08-07 NOTE — PROGRESS NOTES
Physical Therapy Daily Treatment Note  [address] [facility], [address] [place] [address]     Patient: Emilee Gandara   : 1955  Referring practitioner: Ellis Ray PA-C  Diagnosis:      ICD-10-CM ICD-9-CM   1. Acute pain of right shoulder  M25.511 719.41     Date of Initial Visit: Type: THERAPY  Noted: 2024  Today's Date: 2024    VISIT#: 2          Subjective   Emilee Gandara reports: 7/10 pain level today in the R UT and levator scap area.  She reports performing her HEP twice since eval.      Objective   See Exercise, Manual, and Modality Logs for complete treatment.       Assessment & Plan       Assessment  Assessment details: Pt had 35 degrees of cervical L LF, 7/10 pain with pressure on R UT and levator scap trigger points and 50 degrees of L cervical rotation and 55 degrees and painful R rotation prior to dry needling.  After dry needling, pt had decreased pain and 55 deg of L cervical rotation, 60 degrees R rotation, 45 deg of L lateral flexion, 4/10 pain level with pressure on R UT and levator scap trigger points.  She left with improved movement quality and decreased pain.           Progress per Plan of Care and Progress strengthening /stabilization /functional activity           Timed:  Manual Therapy:    3     mins  65086;  Therapeutic Exercise:         mins  22480;     Neuromuscular Jessica:    10    mins  42845;    Therapeutic Activity:          mins  32959;     Gait Training:           mins  43652;     Ultrasound:          mins  30836;    Electrical Stimulation:         mins  27861 ( );    Untimed:  Electrical Stimulation:         mins  02851 ( );  Mechanical Traction:         mins  27354;   Dry needling:    15   Self pay    Timed Treatment:   13   mins   Total Treatment:     28   mins  Muriel Pereira, PT, DPT, CLT, CIDN  Physical Therapist

## 2024-08-13 ENCOUNTER — TREATMENT (OUTPATIENT)
Dept: PHYSICAL THERAPY | Facility: CLINIC | Age: 69
End: 2024-08-13

## 2024-08-13 DIAGNOSIS — M25.511 ACUTE PAIN OF RIGHT SHOULDER: Primary | ICD-10-CM

## 2024-08-13 NOTE — PROGRESS NOTES
Physical Therapy Daily Treatment Note  [address] [facility], [address] [place] [address]     Patient: Emilee Gandara   : 1955  Referring practitioner: Ellis Ray PA-C  Diagnosis:      ICD-10-CM ICD-9-CM   1. Acute pain of right shoulder  M25.511 719.41     Date of Initial Visit: Type: THERAPY  Noted: 2024  Today's Date: 2024    VISIT#: 3          Subjective   Emilee Gandara reports: she was sore after DN last time in the R levator scap area.     Objective   See Exercise, Manual, and Modality Logs for complete treatment.       Assessment & Plan       Assessment  Assessment details: Pt did not have time for treatment today as she had to get to work and just wanted dry needling.  Good response to DN with deep dull ache noted in levator scap area.  Will follow up with hydrating (drinking water) and stretches on her own.           Progress per Plan of Care and Progress strengthening /stabilization /functional activity           Timed:  Manual Therapy:   3    mins  18777;  Therapeutic Exercise:         mins  45020;     Neuromuscular Jessica:        mins  65400;    Therapeutic Activity:          mins  38534;     Gait Training:           mins  75630;     Ultrasound:          mins  31843;    Electrical Stimulation:         mins  61126 ( );    Untimed:  Electrical Stimulation:         mins  84207 ( );  Mechanical Traction:         mins  27739;   Dry needlin   Self pay    Timed Treatment:    3  mins   Total Treatment:     23   mins  Muriel Pereira PT, DPT, CLT, CIDN  Physical Therapist

## 2024-09-05 ENCOUNTER — TELEPHONE (OUTPATIENT)
Dept: PHYSICAL THERAPY | Facility: CLINIC | Age: 69
End: 2024-09-05

## 2024-10-18 ENCOUNTER — LAB (OUTPATIENT)
Dept: LAB | Facility: HOSPITAL | Age: 69
End: 2024-10-18
Payer: MEDICARE

## 2024-10-18 ENCOUNTER — OFFICE VISIT (OUTPATIENT)
Dept: ENDOCRINOLOGY | Facility: CLINIC | Age: 69
End: 2024-10-18
Payer: MEDICARE

## 2024-10-18 VITALS
HEART RATE: 62 BPM | BODY MASS INDEX: 24.1 KG/M2 | WEIGHT: 136 LBS | DIASTOLIC BLOOD PRESSURE: 80 MMHG | SYSTOLIC BLOOD PRESSURE: 152 MMHG | HEIGHT: 63 IN | OXYGEN SATURATION: 98 %

## 2024-10-18 DIAGNOSIS — E16.2 HYPOGLYCEMIA: ICD-10-CM

## 2024-10-18 DIAGNOSIS — E11.9 TYPE 2 DIABETES MELLITUS WITHOUT COMPLICATION, WITHOUT LONG-TERM CURRENT USE OF INSULIN: ICD-10-CM

## 2024-10-18 DIAGNOSIS — E11.9 TYPE 2 DIABETES MELLITUS WITHOUT COMPLICATION, WITHOUT LONG-TERM CURRENT USE OF INSULIN: Primary | ICD-10-CM

## 2024-10-18 DIAGNOSIS — N39.0 RECURRENT UTI: ICD-10-CM

## 2024-10-18 LAB
ALBUMIN SERPL-MCNC: 4.5 G/DL (ref 3.5–5.2)
ALBUMIN/GLOB SERPL: 1.9 G/DL
ALP SERPL-CCNC: 75 U/L (ref 39–117)
ALT SERPL W P-5'-P-CCNC: 25 U/L (ref 1–33)
ANION GAP SERPL CALCULATED.3IONS-SCNC: 8 MMOL/L (ref 5–15)
AST SERPL-CCNC: 29 U/L (ref 1–32)
BILIRUB SERPL-MCNC: 0.7 MG/DL (ref 0–1.2)
BUN SERPL-MCNC: 16 MG/DL (ref 8–23)
BUN/CREAT SERPL: 20.5 (ref 7–25)
CALCIUM SPEC-SCNC: 9.7 MG/DL (ref 8.6–10.5)
CHLORIDE SERPL-SCNC: 101 MMOL/L (ref 98–107)
CO2 SERPL-SCNC: 30 MMOL/L (ref 22–29)
CREAT SERPL-MCNC: 0.78 MG/DL (ref 0.57–1)
EGFRCR SERPLBLD CKD-EPI 2021: 81.3 ML/MIN/1.73
GLOBULIN UR ELPH-MCNC: 2.4 GM/DL
GLUCOSE BLDC GLUCOMTR-MCNC: 80 MG/DL (ref 70–105)
GLUCOSE SERPL-MCNC: 81 MG/DL (ref 65–99)
HBA1C MFR BLD: 6.3 % (ref 4.8–5.6)
POTASSIUM SERPL-SCNC: 4.9 MMOL/L (ref 3.5–5.2)
PROT SERPL-MCNC: 6.9 G/DL (ref 6–8.5)
SODIUM SERPL-SCNC: 139 MMOL/L (ref 136–145)

## 2024-10-18 PROCEDURE — 36415 COLL VENOUS BLD VENIPUNCTURE: CPT

## 2024-10-18 PROCEDURE — 83036 HEMOGLOBIN GLYCOSYLATED A1C: CPT

## 2024-10-18 PROCEDURE — 80053 COMPREHEN METABOLIC PANEL: CPT

## 2024-10-18 PROCEDURE — 82948 REAGENT STRIP/BLOOD GLUCOSE: CPT | Performed by: INTERNAL MEDICINE

## 2024-10-18 RX ORDER — LANCETS 30 GAUGE
1 EACH MISCELLANEOUS DAILY
Qty: 100 EACH | Refills: 3 | Status: SHIPPED | OUTPATIENT
Start: 2024-10-18

## 2024-10-18 RX ORDER — MIGLITOL 25 MG/1
25 TABLET, COATED ORAL DAILY
Qty: 90 TABLET | Refills: 1 | Status: SHIPPED | OUTPATIENT
Start: 2024-10-18

## 2024-10-18 RX ORDER — BLOOD-GLUCOSE METER
1 KIT MISCELLANEOUS DAILY
Qty: 1 EACH | Refills: 0 | Status: SHIPPED | OUTPATIENT
Start: 2024-10-18

## 2024-10-18 NOTE — PROGRESS NOTES
"    -----------------------------------------------------------------  ENDOCRINE CLINIC NOTE  -----------------------------------------------------------------        PATIENT NAME: Emilee Gandara  PATIENT : 1955 AGE: 69 y.o.  MRN NUMBER: [id number]  PRIMARY CARE: Murray Lowe MD    ==========================================================================    CHIEF COMPLAINT: Hypoglycemia with hx of T2DM  DATE OF SERVICE: 10/18/24     ==========================================================================    HPI / SUBJECTIVE    69 y.o. female seen in the clinic today follow-up for type 2 diabetes and reactive hypoglycemia.  Patient is currently taking Glyset 25 mg 3 times a day with each meal and also taking Januvia 50 mg once a day.  Patient was confirmed to have reactive hypoglycemia in .  Patient have a history significant for bowel infection leading to colectomy around 13 years ago.  Last eye exam was 6 to 8 months ago.  Reports of occasional peripheral neuropathy as well.  History of chronic recurrent UTIs as well.  She is not currently checking any blood sugars because she ran out of scripts and insurance was not covering.    ==========================================================================                                                PAST MEDICAL HISTORY    Past Medical History:   Diagnosis Date    Anxiety     Depression     Diabetes mellitus     Hyperlipidemia     Hypertension     Lichen planus     MOUTH - CAUSES SORES, WEBBING AND INFLAMATION    Pancreatic ductal abnormality     \"swollen\"    Recurrent UTI     Tachycardia        ==========================================================================    PAST SURGICAL HISTORY    Past Surgical History:   Procedure Laterality Date    COLON SURGERY      PART OF COLON/REMOVED    COLONOSCOPY N/A 2023    Procedure: COLONOSCOPY with polypectomy x5 and biopsy x1 area;  Surgeon: George Scott MD;  [facility]: [facility]" ENDOSCOPY;  Service: Gastroenterology;  Laterality: N/A;  colon polyps.melanosis,abnormal rectal mucosa    ESOPHAGOSCOPY W/ DILATION      TOTAL ABDOMINAL HYSTERECTOMY  1987       ==========================================================================    FAMILY HISTORY    Family History   Problem Relation Age of Onset    Atrial fibrillation Mother     Heart disease Mother     Hypertension Sister     Breast cancer Paternal Aunt 54    Ovarian cancer Maternal Grandmother 80    Cancer Other     Atrial fibrillation Daughter        ==========================================================================    SOCIAL HISTORY    Social History     Socioeconomic History    Marital status:      Spouse name: Carlo Gandara    Number of children: 4    Years of education: 12    Highest education level: Some college, no degree   Tobacco Use    Smoking status: Never     Passive exposure: Never    Smokeless tobacco: Never   Vaping Use    Vaping status: Never Used   Substance and Sexual Activity    Alcohol use: No    Drug use: No    Sexual activity: Defer       ==========================================================================    MEDICATIONS      Current Outpatient Medications:     acetaminophen (TYLENOL) 325 MG tablet, Take 2 tablets by mouth Every 6 (Six) Hours As Needed for Mild Pain., Disp: , Rfl:     atenolol (TENORMIN) 25 MG tablet, Take 2 tablets by mouth Daily., Disp: 180 tablet, Rfl: 3    atorvastatin (LIPITOR) 40 MG tablet, , Disp: , Rfl:     doxycycline (ADOXA) 100 MG tablet, Take 1 tablet by mouth 2 (Two) Times a Day., Disp: , Rfl:     DULoxetine (CYMBALTA) 60 MG capsule, Take 1 capsule by mouth Daily., Disp: , Rfl:     escitalopram (LEXAPRO) 10 MG tablet, Take 1 tablet by mouth Daily., Disp: , Rfl:     estradiol (ESTRACE) 0.1 MG/GM vaginal cream, APPLY BLUEBERRY SIZE AMOUNT TO INSIDE OF VAGINA THREE TIMES A WEEK., Disp: , Rfl:     glucose blood test strip, 1 each by Other route Every Other Day., Disp:  100 each, Rfl: 5    glucose monitor monitoring kit, Use 1 each Every Other Day., Disp: 1 each, Rfl: 0    hydroxychloroquine (PLAQUENIL) 200 MG tablet, Take 1 tablet by mouth Daily., Disp: , Rfl:     Lancets misc, Use 1 each Every Other Day., Disp: 100 each, Rfl: 5    magnesium chloride ER 64 MG DR tablet, Take 1 tablet by mouth Daily., Disp: , Rfl:     meloxicam (MOBIC) 15 MG tablet, Take 1 tablet by mouth Daily., Disp: , Rfl:     miglitol (GLYSET) 25 MG tablet, Take 1 tablet by mouth Daily., Disp: 90 tablet, Rfl: 1    multivitamin with minerals tablet tablet, Take 1 tablet by mouth Daily., Disp: , Rfl:     omeprazole (priLOSEC) 40 MG capsule, Take 1 capsule by mouth Daily., Disp: , Rfl:     Probiotic Product (UP4 PROBIOTICS PO), Take 1 tablet by mouth Daily., Disp: , Rfl:     SITagliptin (Januvia) 50 MG tablet, Take 1 tablet by mouth Daily., Disp: 90 tablet, Rfl: 1    traMADol (ULTRAM) 50 MG tablet, Take 1 tablet by mouth Every 8 (Eight) Hours As Needed for Moderate Pain., Disp: , Rfl:     fosfomycin (Monurol) 3 g pack, , Disp: , Rfl:     glucose blood test strip, 1 each by Other route Daily., Disp: 100 each, Rfl: 3    glucose monitor monitoring kit, Use 1 each Daily., Disp: 1 each, Rfl: 0    Lancets misc, Use 1 each Daily., Disp: 100 each, Rfl: 3    ==========================================================================    ALLERGIES    Allergies   Allergen Reactions    Sulfate Unknown - Low Severity    Sulfa Antibiotics Rash       ==========================================================================    OBJECTIVE    Vitals:    10/18/24 0949   BP: 152/80   Pulse: 62   SpO2: 98%     Body mass index is 24.09 kg/m².     General: Alert, cooperative, no acute distress  Lungs: Clear to auscultation bilaterally, respirations unlabored  Heart: Regular rate and rhythm, S1 and S2 normal, no murmur, rub or gallop  Abdomen: Soft, NT, ND and Bowel sounds Positive  Extremities:  Extremities normal, atraumatic, no cyanosis  "or edema    ==========================================================================    LAB EVALUATION    Lab Results   Component Value Date    GLUCOSE 132 (H) 11/02/2023    BUN 26 (H) 11/02/2023    CREATININE 1.03 (H) 11/02/2023    EGFRIFNONA >60 08/01/2022    EGFRIFAFRI >60 09/20/2022    BCR 25.2 (H) 11/02/2023    K 4.5 11/02/2023    CO2 24.0 11/02/2023    CALCIUM 9.9 11/02/2023    ALBUMIN 4.4 11/02/2023    LABIL2 1.5 07/06/2022    AST 38 (H) 11/02/2023    ALT 28 11/02/2023    CHOL 146 01/09/2020    TRIG 126 06/25/2020    HDL 62 06/25/2020    LDL 82 06/25/2020     Lab Results   Component Value Date    HGBA1C 6.90 (H) 01/24/2024    HGBA1C 6.10 (H) 07/11/2023    HGBA1C 6.7 (H) 12/02/2022     Lab Results   Component Value Date    CREATININE 1.03 (H) 11/02/2023     Lab Results   Component Value Date    TSH 0.780 01/24/2024    FREET4 1.08 01/24/2024     OGTT  7/21/2023  Fasting - 110  1 Hour - 250  2 Hour - 221  3 Hour - 134  4 Hour - 59    ==========================================================================    ASSESSMENT AND PLAN    # Reactive hypoglycemia with T2DM  # Chronic recurrent UTIs  # Hypertension  # Hyperlipidemia  # Atrial fibrillation    Plan:  - Discussed with patient that she can continue to take Glyset 25 mg once a day in the morning  - Patient can continue taking Januvia therapy for now as well  - Will repeat blood work today and will send prescription for glucometer along with strips and lancets  - Patient target will be to maintain fasting blood sugar between 70 and 120  - Follow-up in my clinic back again in 6 months time    Return to clinic: 6 months    Entire assessment and plan was discussed and counseled the patient in detail to which patient verbalized understanding and agreed with care.  Answered all queries and concerns.    This note was created using voice recognition software and is inherently subject to errors including those of syntax and \"sound-alike\" substitutions which may " escape proofreading.  In such instances, original meaning may be extrapolated by contextual derivation.    ==========================================================================    INFORMATION PROVIDED TO PATIENT    Patient Instructions   Please,    - Continue taking Januvia 50 mg once a day.  - Continue taking Glyset 25 mg once a day as well.    Please check your blood sugar once a day in the morning, your target blood sugars to maintain between 70 and 120.    Repeat blood work and clinical follow-up in 6 months time.  You can also continue to follow-up with your primary care as well.    Thank you for your visit today.    If you have any questions or concerns please feel free to reach out of the office.       ==========================================================================  Franklin Tsang MD  Department of Endocrine, Diabetes and Metabolism  [place], [place]  ==========================================================================

## 2024-10-18 NOTE — PATIENT INSTRUCTIONS
Please,    - Continue taking Januvia 50 mg once a day.  - Continue taking Glyset 25 mg once a day as well.    Please check your blood sugar once a day in the morning, your target blood sugars to maintain between 70 and 120.    Repeat blood work and clinical follow-up in 6 months time.  You can also continue to follow-up with your primary care as well.    Thank you for your visit today.    If you have any questions or concerns please feel free to reach out of the office.

## 2024-11-26 ENCOUNTER — OFFICE VISIT (OUTPATIENT)
Dept: CARDIOLOGY | Facility: CLINIC | Age: 69
End: 2024-11-26
Payer: MEDICARE

## 2024-11-26 VITALS
HEART RATE: 63 BPM | OXYGEN SATURATION: 98 % | DIASTOLIC BLOOD PRESSURE: 73 MMHG | SYSTOLIC BLOOD PRESSURE: 167 MMHG | BODY MASS INDEX: 24.17 KG/M2 | HEIGHT: 63 IN | WEIGHT: 136.4 LBS

## 2024-11-26 DIAGNOSIS — I47.10 SVT (SUPRAVENTRICULAR TACHYCARDIA): Primary | ICD-10-CM

## 2024-11-26 DIAGNOSIS — I10 PRIMARY HYPERTENSION: ICD-10-CM

## 2024-11-26 PROCEDURE — 1159F MED LIST DOCD IN RCRD: CPT | Performed by: INTERNAL MEDICINE

## 2024-11-26 PROCEDURE — 3078F DIAST BP <80 MM HG: CPT | Performed by: INTERNAL MEDICINE

## 2024-11-26 PROCEDURE — 99212 OFFICE O/P EST SF 10 MIN: CPT | Performed by: INTERNAL MEDICINE

## 2024-11-26 PROCEDURE — 1160F RVW MEDS BY RX/DR IN RCRD: CPT | Performed by: INTERNAL MEDICINE

## 2024-11-26 PROCEDURE — 3077F SYST BP >= 140 MM HG: CPT | Performed by: INTERNAL MEDICINE

## 2024-11-26 PROCEDURE — 93000 ELECTROCARDIOGRAM COMPLETE: CPT | Performed by: INTERNAL MEDICINE

## 2024-11-26 NOTE — PROGRESS NOTES
"Progress note      Name: Emilee Gandara ADMIT: (Not on file)   : 1955  PCP: Murray Lowe MD    MRN: [id number] LOS: 0 days   AGE/SEX: 69 y.o. female  ROOM: Room/bed info not found     Chief Complaint   Patient presents with    Follow-up     1yr        Subjective       History of present illness  Emilee Gandara is a 69-year-old female patient who has diabetes, dyslipidemia, atrial tachycardia diagnosed in 2022, here today for follow-up.  Overall she is doing well cardiac wise she had 1 instance of chest discomfort which lasted only about a minute a few weeks ago but denies any exertional chest pain or shortness of breath.    Past Medical History:   Diagnosis Date    Anxiety     Depression     Diabetes mellitus     Hyperlipidemia     Hypertension     Lichen planus     MOUTH - CAUSES SORES, WEBBING AND INFLAMATION    Pancreatic ductal abnormality     \"swollen\"    Recurrent UTI     Tachycardia      Past Surgical History:   Procedure Laterality Date    COLON SURGERY      PART OF COLON/REMOVED    COLONOSCOPY N/A 2023    Procedure: COLONOSCOPY with polypectomy x5 and biopsy x1 area;  Surgeon: George Scott MD;  Location: [facility];  Service: Gastroenterology;  Laterality: N/A;  colon polyps.melanosis,abnormal rectal mucosa    ESOPHAGOSCOPY W/ DILATION      TOTAL ABDOMINAL HYSTERECTOMY       Family History   Problem Relation Age of Onset    Atrial fibrillation Mother     Heart disease Mother     Hypertension Sister     Breast cancer Paternal Aunt 54    Ovarian cancer Maternal Grandmother 80    Cancer Other     Atrial fibrillation Daughter      Social History     Tobacco Use    Smoking status: Never     Passive exposure: Never    Smokeless tobacco: Never   Vaping Use    Vaping status: Never Used   Substance Use Topics    Alcohol use: No    Drug use: No       Current Outpatient Medications:     acetaminophen (TYLENOL) 325 MG tablet, Take 2 tablets by mouth Every 6 (Six) Hours As " Needed for Mild Pain., Disp: , Rfl:     atenolol (TENORMIN) 25 MG tablet, Take 2 tablets by mouth Daily., Disp: 180 tablet, Rfl: 3    atorvastatin (LIPITOR) 40 MG tablet, , Disp: , Rfl:     doxycycline (ADOXA) 100 MG tablet, Take 1 tablet by mouth 2 (Two) Times a Day., Disp: , Rfl:     DULoxetine (CYMBALTA) 60 MG capsule, Take 1 capsule by mouth Daily., Disp: , Rfl:     escitalopram (LEXAPRO) 10 MG tablet, Take 1 tablet by mouth Daily., Disp: , Rfl:     estradiol (ESTRACE) 0.1 MG/GM vaginal cream, APPLY BLUEBERRY SIZE AMOUNT TO INSIDE OF VAGINA THREE TIMES A WEEK., Disp: , Rfl:     fosfomycin (Monurol) 3 g pack, , Disp: , Rfl:     glucose blood test strip, 1 each by Other route Every Other Day., Disp: 100 each, Rfl: 5    glucose blood test strip, 1 each by Other route Daily., Disp: 100 each, Rfl: 3    glucose monitor monitoring kit, Use 1 each Every Other Day., Disp: 1 each, Rfl: 0    glucose monitor monitoring kit, Use 1 each Daily., Disp: 1 each, Rfl: 0    hydroxychloroquine (PLAQUENIL) 200 MG tablet, Take 1 tablet by mouth Daily., Disp: , Rfl:     Lancets misc, Use 1 each Every Other Day., Disp: 100 each, Rfl: 5    Lancets misc, Use 1 each Daily., Disp: 100 each, Rfl: 3    magnesium chloride ER 64 MG DR tablet, Take 1 tablet by mouth Daily., Disp: , Rfl:     meloxicam (MOBIC) 15 MG tablet, Take 1 tablet by mouth Daily., Disp: , Rfl:     miglitol (GLYSET) 25 MG tablet, Take 1 tablet by mouth Daily., Disp: 90 tablet, Rfl: 1    multivitamin with minerals tablet tablet, Take 1 tablet by mouth Daily., Disp: , Rfl:     omeprazole (priLOSEC) 40 MG capsule, Take 1 capsule by mouth Daily., Disp: , Rfl:     Probiotic Product (UP4 PROBIOTICS PO), Take 1 tablet by mouth Daily., Disp: , Rfl:     SITagliptin (Januvia) 50 MG tablet, Take 1 tablet by mouth Daily., Disp: 90 tablet, Rfl: 1    traMADol (ULTRAM) 50 MG tablet, Take 1 tablet by mouth Every 8 (Eight) Hours As Needed for Moderate Pain., Disp: , Rfl:   Allergies:   Sulfate and Sulfa antibiotics      Physical Exam  VITALS REVIEWED    General:      well developed, in no acute distress.    Head:      normocephalic and atraumatic.    Eyes:      PERRL/EOM intact, conjunctiva and sclera clear with out nystagmus.    Neck:      no masses, thyromegaly,  trachea central with normal respiratory effort and PMI displaced laterally  Lungs:      Clear to auscultation bilaterally  Heart:       Regular rate and rhythm, no murmur  Msk:      no deformity or scoliosis noted of thoracic or lumbar spine.    Pulses:      pulses normal in all 4 extremities.    Extremities:       No lower extremity edema  Neurologic:      no focal deficits.   alert oriented x3  Skin:      intact without lesions or rashes.    Psych:      alert and cooperative; normal mood and affect; normal attention span and concentration.      Result Review :               Pertinent cardiac workup    EKG 3/28/2022 atrial tachycardia heart rate of 144 bpm.  Echo 3/29/2022 ejection fraction 60 to 65%  Event monitor 3/29/2022 for 25 days showed sinus rhythm, no A. fib or atrial flutter, short runs of ectopy noted.  Stress test 5/9/2023 low risk EF 63%           ECG 12 Lead    Date/Time: 11/26/2024 10:31 AM  Performed by: Jerry Pino MD    Authorized by: Jerry Pino MD  Comparison: compared with previous ECG   Similar to previous ECG  Rhythm: sinus rhythm  Ectopy: atrial premature contractions  Rate: normal  BPM: 61  Conduction: conduction normal  ST Segments: ST segments normal  QRS axis: normal  Other findings: non-specific ST-T wave changes              Assessment and Plan      Emilee Gandara is a 69-year-old female patient with no apparent history of CAD, she had an episode of atrial tachycardia in March 2022 and she was started on atenolol which seems to be controlling it.  Patient's blood pressure is elevated today, I advised her to monitor her blood pressure more closely at home and if it is consistently elevated  above 140 systolic then we can add amlodipine.  Otherwise 1 year follow-up.    Diagnoses and all orders for this visit:    1. SVT (supraventricular tachycardia) (Primary)  Overview:  Overview:   Demonstrated on Holter      2. Primary hypertension           Return in about 1 year (around 11/26/2025).  Patient was given instructions and counseling regarding her condition or for health maintenance advice. Please see specific information pulled into the AVS if appropriate.       Electronically signed by Jerry Pino MD, 11/26/24, 10:33 AM EST.

## 2024-12-11 DIAGNOSIS — E11.9 TYPE 2 DIABETES MELLITUS WITHOUT COMPLICATION, WITHOUT LONG-TERM CURRENT USE OF INSULIN: ICD-10-CM

## 2024-12-11 DIAGNOSIS — E16.2 HYPOGLYCEMIA: ICD-10-CM

## 2024-12-16 RX ORDER — LANCETS 30 GAUGE
1 EACH MISCELLANEOUS DAILY
Qty: 100 EACH | Refills: 3 | Status: SHIPPED | OUTPATIENT
Start: 2024-12-16

## 2025-02-15 ENCOUNTER — HOSPITAL ENCOUNTER (EMERGENCY)
Facility: HOSPITAL | Age: 70
Discharge: HOME | End: 2025-02-15
Payer: MEDICARE

## 2025-02-15 ENCOUNTER — APPOINTMENT (OUTPATIENT)
Dept: CT IMAGING | Facility: HOSPITAL | Age: 70
End: 2025-02-15
Payer: MEDICARE

## 2025-02-15 VITALS
BODY MASS INDEX: 24.45 KG/M2 | WEIGHT: 138.01 LBS | OXYGEN SATURATION: 99 % | HEIGHT: 63 IN | SYSTOLIC BLOOD PRESSURE: 140 MMHG | TEMPERATURE: 98.2 F | RESPIRATION RATE: 18 BRPM | DIASTOLIC BLOOD PRESSURE: 80 MMHG | HEART RATE: 69 BPM

## 2025-02-15 DIAGNOSIS — S00.83XA CONTUSION OF OTHER PART OF HEAD, INITIAL ENCOUNTER: ICD-10-CM

## 2025-02-15 DIAGNOSIS — S09.90XA CLOSED HEAD INJURY, INITIAL ENCOUNTER: Primary | ICD-10-CM

## 2025-02-15 PROCEDURE — 70486 CT MAXILLOFACIAL W/O DYE: CPT

## 2025-02-15 PROCEDURE — 99284 EMERGENCY DEPT VISIT MOD MDM: CPT

## 2025-02-15 PROCEDURE — 70450 CT HEAD/BRAIN W/O DYE: CPT

## 2025-02-15 PROCEDURE — 72125 CT NECK SPINE W/O DYE: CPT

## 2025-02-15 RX ORDER — ACETAMINOPHEN 500 MG
1000 TABLET ORAL ONCE
Status: COMPLETED | OUTPATIENT
Start: 2025-02-15 | End: 2025-02-15

## 2025-02-15 RX ADMIN — ACETAMINOPHEN 1000 MG: 500 TABLET, FILM COATED ORAL at 16:00

## 2025-02-15 NOTE — ED PROVIDER NOTES
"Subjective   History of Present Illness  Chief complaint: Head injury Thursday night, nosebleed Friday.      Context: Patient is a 69-year-old female who presents to the ER stating that she was struck in the forehead Thursday night with a cooler door.  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  Patient does report headache, and states that she did have a nosebleed on Friday.  Patient denies any subsequent nosebleeds.  Patient denies any chest pain, shortness of air, fever, abdominal pain, nausea/vomiting/diarrhea  Patient denies any blood thinner use.    PCP: Murray Stewart    LMP: Hysterectomy          Review of Systems   Constitutional:  Negative for fever.       Past Medical History:   Diagnosis Date    Anxiety     Depression     Diabetes mellitus     Hyperlipidemia     Hypertension     Lichen planus     MOUTH - CAUSES SORES, WEBBING AND INFLAMATION    Pancreatic ductal abnormality     \"swollen\"    Recurrent UTI     Tachycardia        Allergies   Allergen Reactions    Sulfate Unknown - Low Severity    Sulfa Antibiotics Rash       Past Surgical History:   Procedure Laterality Date    COLON SURGERY  2003    PART OF COLON/REMOVED    COLONOSCOPY N/A 01/17/2023    Procedure: COLONOSCOPY with polypectomy x5 and biopsy x1 area;  Surgeon: George Scott MD;  Location: [facility];  Service: Gastroenterology;  Laterality: N/A;  colon polyps.melanosis,abnormal rectal mucosa    ESOPHAGOSCOPY W/ DILATION      TOTAL ABDOMINAL HYSTERECTOMY  1987       Family History   Problem Relation Age of Onset    Atrial fibrillation Mother     Heart disease Mother     Hypertension Sister     Breast cancer Paternal Aunt 54    Ovarian cancer Maternal Grandmother 80    Cancer Other     Atrial fibrillation Daughter        Social History "     Socioeconomic History    Marital status:      Spouse name: Carlo Gandara    Number of children: 4    Years of education: 12    Highest education level: Some college, no degree   Tobacco Use    Smoking status: Never     Passive exposure: Never    Smokeless tobacco: Never   Vaping Use    Vaping status: Never Used   Substance and Sexual Activity    Alcohol use: No    Drug use: No    Sexual activity: Defer           Objective   Physical Exam  Vitals and nursing note reviewed.   Constitutional:       Appearance: Normal appearance.   HENT:      Head: Normocephalic. No raccoon eyes or Cordon's sign.        Comments: Faint contusion noted to forehead.  No raccoon eyes or cordon signs noted.     Right Ear: Tympanic membrane normal.      Left Ear: Tympanic membrane normal.      Ears:      Comments: No hemotympanum noted.     Nose: Nose normal.      Comments: No septal hematomas noted.     Mouth/Throat:      Mouth: Mucous membranes are moist.   Eyes:      Extraocular Movements: Extraocular movements intact.      Pupils: Pupils are equal, round, and reactive to light.   Cardiovascular:      Rate and Rhythm: Normal rate and regular rhythm.      Pulses: Normal pulses.      Heart sounds: Normal heart sounds.   Pulmonary:      Effort: Pulmonary effort is normal.      Breath sounds: Normal breath sounds. No wheezing.   Abdominal:      Palpations: Abdomen is soft.      Tenderness: There is no abdominal tenderness.   Musculoskeletal:         General: Normal range of motion.      Cervical back: Normal range of motion. No tenderness.   Skin:     General: Skin is warm and dry.      Capillary Refill: Capillary refill takes less than 2 seconds.   Neurological:      General: No focal deficit present.      Mental Status: She is alert and oriented to person, place, and time.   Psychiatric:         Mood and Affect: Mood normal.         Behavior: Behavior normal.         Procedures           ED Course            /80   Pulse  "69   Temp 98.2 °F (36.8 °C) (Oral)   Resp 18   Ht 160 cm (63\")   Wt 62.6 kg (138 lb 0.1 oz)   SpO2 99%   BMI 24.45 kg/m²   Labs Reviewed - No data to display  Medications   acetaminophen (TYLENOL) tablet 1,000 mg (1,000 mg Oral Given 2/15/25 1600)     CT Head Without Contrast    Result Date: 2/15/2025  Impression: 1.No acute intracranial abnormality. 2.No definite facial bone fracture. 3.Cystic area left mandibular condyle that could be reflective of degenerative change. 4.Multilevel degenerative change cervical spine. Electronically Signed: Sabino Arthur MD  2/15/2025 4:41 PM EST  Workstation ID: [id number]    CT Cervical Spine Without Contrast    Result Date: 2/15/2025  Impression: 1.No acute intracranial abnormality. 2.No definite facial bone fracture. 3.Cystic area left mandibular condyle that could be reflective of degenerative change. 4.Multilevel degenerative change cervical spine. Electronically Signed: Sabino Arthur MD  2/15/2025 4:41 PM EST  Workstation ID: [id number]    CT Facial Bones Without Contrast    Result Date: 2/15/2025  Impression: 1.No acute intracranial abnormality. 2.No definite facial bone fracture. 3.Cystic area left mandibular condyle that could be reflective of degenerative change. 4.Multilevel degenerative change cervical spine. Electronically Signed: Sabino Arthur MD  2/15/2025 4:41 PM EST  Workstation ID: [id number]                                              Medical Decision Making  Radiology interpretation: CT head, cervical spine, facial bones reviewed and interpreted by Renetta: No acute intracranial abnormality.  2.No definite facial bone fracture.  3.Cystic area left mandibular condyle that could be reflective of degenerative change.  4.Multilevel degenerative change cervical spine.  Further interpretation by radiologist as above    Lab was considered but was not emergently warranted at this time.    EKG was considered but was not emergently warranted at this time.    Scans were " obtained to evaluate for ICH, fracture, subluxation.  Patient is a 69-year-old female who presents to the ER for above complaint.  On initial examination patient was resting comfortably in bed, nontoxic in appearance with no signs and symptoms of distress.  Physical examination revealed faint ecchymosis to middle of forehead that extends to between the eyes, lungs clear bilaterally on auscultation, no tenderness noted to abdomen, no septal hematomas noted, no hemotympanum noted, pupils PERRLA, no neurofocal deficits.  RN reports patient is complaining of headache, PO times given.  On reexamination patient was resting comfortably in the bed, nontoxic in appearance with no signs and symptoms of distress.  Chest findings and decision to discharge.  Advised patient to rest, drink plenty of fluids, may alternate Tylenol or ibuprofen as needed for headaches and not to exceed 4000 mg/day of Tylenol or 3200 mg/day of ibuprofen.  Follow-up with primary care as needed.  And to return to the ER for any new or worsening symptoms.  Patient verbalized understanding of all discharge instructions.      Appropriate PPE worn during exam.    i discussed findings with patient who voices understanding of discharge instructions, signs and symptoms requiring return to ED; discharged improved and in stable condition with follow up for re-evaluation.  This document is intended for medical expert use only. Reading of this document by patients and/or patient's family without participating medical staff guidance may result in misinterpretation and unintended morbidity.  Any interpretation of such data is the responsibility of the patient and/or family member responsible for the patient in concert with their primary or specialist providers, not to be left for sources of online searches such as [employer], [employer] or similar queries. Relying on these approaches to knowledge may result in misinterpretation, misguided goals of care and even death should  patients or family members try recommendations outside of the realm of professional medical care in a supervised inpatient environment.         Problems Addressed:  Closed head injury, initial encounter: complicated acute illness or injury    Amount and/or Complexity of Data Reviewed  Radiology: ordered.    Risk  OTC drugs.        Final diagnoses:   Closed head injury, initial encounter   Contusion of other part of head, initial encounter       ED Disposition  ED Disposition       ED Disposition   Discharge    Condition   Stable    Comment   --               Murray Lowe MD  [address] [address]  [phone]    Schedule an appointment as soon as possible for a visit   Call to schedule an appointment as needed.         Medication List      No changes were made to your prescriptions during this visit.            Renee Manzo, APRN  02/15/25 [id number]

## 2025-02-15 NOTE — DISCHARGE INSTRUCTIONS
Rest.  Drink plenty of fluids.  May alternate Tylenol or ibuprofen as needed for headache, body aches.  Do not exceed 4000 mg/day of Tylenol or 3200 mg/day of ibuprofen.  Follow-up with primary care, call Monday to schedule appointment as needed.  Return to the ER for any new or worsening symptoms.

## 2025-03-31 ENCOUNTER — LAB (OUTPATIENT)
Dept: LAB | Facility: HOSPITAL | Age: 70
End: 2025-03-31
Payer: MEDICARE

## 2025-03-31 ENCOUNTER — TRANSCRIBE ORDERS (OUTPATIENT)
Dept: ADMINISTRATIVE | Facility: HOSPITAL | Age: 70
End: 2025-03-31
Payer: MEDICARE

## 2025-03-31 ENCOUNTER — HOSPITAL ENCOUNTER (OUTPATIENT)
Dept: CARDIOLOGY | Facility: HOSPITAL | Age: 70
Discharge: HOME | End: 2025-03-31
Payer: MEDICARE

## 2025-03-31 DIAGNOSIS — K43.9 VENTRAL HERNIA WITHOUT OBSTRUCTION OR GANGRENE: ICD-10-CM

## 2025-03-31 DIAGNOSIS — K43.9 VENTRAL HERNIA WITHOUT OBSTRUCTION OR GANGRENE: Primary | ICD-10-CM

## 2025-03-31 LAB
BASOPHILS # BLD AUTO: 0.04 10*3/MM3 (ref 0–0.2)
BASOPHILS NFR BLD AUTO: 0.6 % (ref 0–1.5)
DEPRECATED RDW RBC AUTO: 41 FL (ref 37–54)
EOSINOPHIL # BLD AUTO: 0.12 10*3/MM3 (ref 0–0.4)
EOSINOPHIL NFR BLD AUTO: 1.9 % (ref 0.3–6.2)
ERYTHROCYTE [DISTWIDTH] IN BLOOD BY AUTOMATED COUNT: 13.1 % (ref 12.3–15.4)
HCT VFR BLD AUTO: 39.8 % (ref 34–46.6)
HGB BLD-MCNC: 12.8 G/DL (ref 12–15.9)
IMM GRANULOCYTES # BLD AUTO: 0.02 10*3/MM3 (ref 0–0.05)
IMM GRANULOCYTES NFR BLD AUTO: 0.3 % (ref 0–0.5)
LYMPHOCYTES # BLD AUTO: 1.53 10*3/MM3 (ref 0.7–3.1)
LYMPHOCYTES NFR BLD AUTO: 23.9 % (ref 19.6–45.3)
MCH RBC QN AUTO: 27.6 PG (ref 26.6–33)
MCHC RBC AUTO-ENTMCNC: 32.2 G/DL (ref 31.5–35.7)
MCV RBC AUTO: 86 FL (ref 79–97)
MONOCYTES # BLD AUTO: 0.42 10*3/MM3 (ref 0.1–0.9)
MONOCYTES NFR BLD AUTO: 6.6 % (ref 5–12)
NEUTROPHILS NFR BLD AUTO: 4.26 10*3/MM3 (ref 1.7–7)
NEUTROPHILS NFR BLD AUTO: 66.7 % (ref 42.7–76)
NRBC BLD AUTO-RTO: 0 /100 WBC (ref 0–0.2)
PLATELET # BLD AUTO: 231 10*3/MM3 (ref 140–450)
PMV BLD AUTO: 10.9 FL (ref 6–12)
RBC # BLD AUTO: 4.63 10*6/MM3 (ref 3.77–5.28)
WBC NRBC COR # BLD AUTO: 6.39 10*3/MM3 (ref 3.4–10.8)

## 2025-03-31 PROCEDURE — 36415 COLL VENOUS BLD VENIPUNCTURE: CPT

## 2025-03-31 PROCEDURE — 93005 ELECTROCARDIOGRAM TRACING: CPT | Performed by: SURGERY

## 2025-03-31 PROCEDURE — 85025 COMPLETE CBC W/AUTO DIFF WBC: CPT

## 2025-04-03 LAB
QT INTERVAL: 443 MS
QTC INTERVAL: 488 MS

## 2025-04-07 ENCOUNTER — LAB (OUTPATIENT)
Dept: ENDOCRINOLOGY | Facility: CLINIC | Age: 70
End: 2025-04-07
Payer: MEDICARE

## 2025-04-07 DIAGNOSIS — E11.9 TYPE 2 DIABETES MELLITUS WITHOUT COMPLICATION, WITHOUT LONG-TERM CURRENT USE OF INSULIN: ICD-10-CM

## 2025-04-08 LAB
ALBUMIN SERPL-MCNC: 4.6 G/DL (ref 3.8–4.8)
ALP SERPL-CCNC: 84 IU/L (ref 44–121)
ALT SERPL-CCNC: 29 IU/L (ref 0–32)
AST SERPL-CCNC: 29 IU/L (ref 0–40)
BILIRUB SERPL-MCNC: 0.7 MG/DL (ref 0–1.2)
BUN SERPL-MCNC: 17 MG/DL (ref 8–27)
BUN/CREAT SERPL: 22 (ref 12–28)
CALCIUM SERPL-MCNC: 9.4 MG/DL (ref 8.7–10.3)
CHLORIDE SERPL-SCNC: 103 MMOL/L (ref 96–106)
CO2 SERPL-SCNC: 26 MMOL/L (ref 20–29)
CREAT SERPL-MCNC: 0.76 MG/DL (ref 0.57–1)
EGFRCR SERPLBLD CKD-EPI 2021: 84 ML/MIN/1.73
GLOBULIN SER CALC-MCNC: 1.9 G/DL (ref 1.5–4.5)
GLUCOSE SERPL-MCNC: 93 MG/DL (ref 70–99)
HBA1C MFR BLD: 6.4 % (ref 4.8–5.6)
POTASSIUM SERPL-SCNC: 4.6 MMOL/L (ref 3.5–5.2)
PROT SERPL-MCNC: 6.5 G/DL (ref 6–8.5)
SODIUM SERPL-SCNC: 139 MMOL/L (ref 134–144)

## 2025-04-14 ENCOUNTER — OFFICE VISIT (OUTPATIENT)
Dept: ENDOCRINOLOGY | Facility: CLINIC | Age: 70
End: 2025-04-14
Payer: MEDICARE

## 2025-04-14 VITALS
WEIGHT: 140 LBS | DIASTOLIC BLOOD PRESSURE: 75 MMHG | HEART RATE: 77 BPM | BODY MASS INDEX: 24.8 KG/M2 | HEIGHT: 63 IN | OXYGEN SATURATION: 97 % | SYSTOLIC BLOOD PRESSURE: 132 MMHG

## 2025-04-14 DIAGNOSIS — E11.9 TYPE 2 DIABETES MELLITUS WITHOUT COMPLICATION, WITHOUT LONG-TERM CURRENT USE OF INSULIN: Primary | ICD-10-CM

## 2025-04-14 DIAGNOSIS — I10 HYPERTENSION, UNSPECIFIED TYPE: ICD-10-CM

## 2025-04-14 DIAGNOSIS — N39.0 RECURRENT UTI: ICD-10-CM

## 2025-04-14 DIAGNOSIS — E16.2 HYPOGLYCEMIA: ICD-10-CM

## 2025-04-14 DIAGNOSIS — E78.5 HYPERLIPIDEMIA, UNSPECIFIED HYPERLIPIDEMIA TYPE: ICD-10-CM

## 2025-04-14 PROCEDURE — G2211 COMPLEX E/M VISIT ADD ON: HCPCS | Performed by: INTERNAL MEDICINE

## 2025-04-14 PROCEDURE — 3078F DIAST BP <80 MM HG: CPT | Performed by: INTERNAL MEDICINE

## 2025-04-14 PROCEDURE — 3075F SYST BP GE 130 - 139MM HG: CPT | Performed by: INTERNAL MEDICINE

## 2025-04-14 PROCEDURE — 1160F RVW MEDS BY RX/DR IN RCRD: CPT | Performed by: INTERNAL MEDICINE

## 2025-04-14 PROCEDURE — 1159F MED LIST DOCD IN RCRD: CPT | Performed by: INTERNAL MEDICINE

## 2025-04-14 PROCEDURE — 99214 OFFICE O/P EST MOD 30 MIN: CPT | Performed by: INTERNAL MEDICINE

## 2025-04-14 PROCEDURE — 3044F HG A1C LEVEL LT 7.0%: CPT | Performed by: INTERNAL MEDICINE

## 2025-04-14 RX ORDER — AMOXICILLIN 500 MG/1
CAPSULE ORAL
COMMUNITY
Start: 2025-04-02

## 2025-04-14 RX ORDER — LIDOCAINE 50 MG/G
PATCH TOPICAL
COMMUNITY

## 2025-04-14 RX ORDER — MIGLITOL 25 MG/1
25 TABLET, COATED ORAL DAILY
Qty: 90 TABLET | Refills: 1 | Status: SHIPPED | OUTPATIENT
Start: 2025-04-14

## 2025-04-14 RX ORDER — BLOOD-GLUCOSE METER
1 KIT MISCELLANEOUS EVERY OTHER DAY
Qty: 1 EACH | Refills: 0 | Status: SHIPPED | OUTPATIENT
Start: 2025-04-14

## 2025-04-14 RX ORDER — AVOBENZONE, HOMOSALATE, OCTISALATE, OCTOCRYLENE 30; 40; 45; 26 MG/ML; MG/ML; MG/ML; MG/ML
1 CREAM TOPICAL EVERY OTHER DAY
Qty: 100 EACH | Refills: 5 | Status: SHIPPED | OUTPATIENT
Start: 2025-04-14

## 2025-04-14 RX ORDER — HYDROCODONE BITARTRATE AND ACETAMINOPHEN 7.5; 325 MG/1; MG/1
1 TABLET ORAL EVERY 6 HOURS PRN
COMMUNITY
Start: 2025-04-10 | End: 2025-04-15

## 2025-04-14 NOTE — PROGRESS NOTES
"    -----------------------------------------------------------------  ENDOCRINE CLINIC NOTE  -----------------------------------------------------------------        PATIENT NAME: Emilee Gandara  PATIENT : 1955 AGE: 69 y.o.  MRN NUMBER: [id number]  PRIMARY CARE: Murray Lowe MD    ==========================================================================    CHIEF COMPLAINT: Hypoglycemia with hx of T2DM  DATE OF SERVICE: 25     ==========================================================================    HPI / SUBJECTIVE    69 y.o. female seen in the clinic today follow-up for type 2 diabetes and reactive hypoglycemia.  Patient reports to be currently taking Glyset 25 mg once a day along with Januvia 50 mg once a day.  Patient have a history of reactive hypoglycemia confirmed in .  Patient have a history significant for bowel infection leading to colectomy around 13 to 14 years ago.  Last eye exam was in early  on no repeat eye exam.  Occasional neuropathy.  History of chronic UTIs as well.  Patient is not checking blood sugars because she does not have supplies.  Patient is also currently healing from umbilical hernia surgery which was complicated with additions.    ==========================================================================                                                PAST MEDICAL HISTORY    Past Medical History:   Diagnosis Date    Anxiety     Depression     Diabetes mellitus     Hyperlipidemia     Hypertension     Lichen planus     MOUTH - CAUSES SORES, WEBBING AND INFLAMATION    Pancreatic ductal abnormality     \"swollen\"    Recurrent UTI     Tachycardia        ==========================================================================    PAST SURGICAL HISTORY    Past Surgical History:   Procedure Laterality Date    COLON SURGERY      PART OF COLON/REMOVED    COLONOSCOPY N/A 2023    Procedure: COLONOSCOPY with polypectomy x5 and biopsy x1 area;  Surgeon: " George Scott MD;  Location: [facility] ENDOSCOPY;  Service: Gastroenterology;  Laterality: N/A;  colon polyps.melanosis,abnormal rectal mucosa    ESOPHAGOSCOPY W/ DILATION      HERNIA REPAIR Left 04/11/2025    umbilica hernia    TOTAL ABDOMINAL HYSTERECTOMY  1987       ==========================================================================    FAMILY HISTORY    Family History   Problem Relation Age of Onset    Atrial fibrillation Mother     Heart disease Mother     Hypertension Sister     Breast cancer Paternal Aunt 54    Ovarian cancer Maternal Grandmother 80    Cancer Other     Atrial fibrillation Daughter        ==========================================================================    SOCIAL HISTORY    Social History     Socioeconomic History    Marital status:      Spouse name: Carlo Gandara    Number of children: 4    Years of education: 12    Highest education level: Some college, no degree   Tobacco Use    Smoking status: Never     Passive exposure: Never    Smokeless tobacco: Never   Vaping Use    Vaping status: Never Used   Substance and Sexual Activity    Alcohol use: No    Drug use: No    Sexual activity: Defer       ==========================================================================    MEDICATIONS      Current Outpatient Medications:     acetaminophen (TYLENOL) 325 MG tablet, Take 2 tablets by mouth Every 6 (Six) Hours As Needed for Mild Pain., Disp: , Rfl:     amoxicillin (AMOXIL) 500 MG capsule, TAKE 1 CAPSULE BY MOUTH THREE TIMES DAILY FOR 10 DAYS THEN TAKE 1 CAPSULE BY MOUTH EVERY NIGHT AT BEDTIME, Disp: , Rfl:     atenolol (TENORMIN) 25 MG tablet, Take 2 tablets by mouth Daily., Disp: 180 tablet, Rfl: 3    atorvastatin (LIPITOR) 40 MG tablet, , Disp: , Rfl:     DULoxetine (CYMBALTA) 60 MG capsule, Take 1 capsule by mouth Daily., Disp: , Rfl:     escitalopram (LEXAPRO) 10 MG tablet, Take 1 tablet by mouth Daily., Disp: , Rfl:     estradiol (ESTRACE) 0.1 MG/GM vaginal cream, APPLY  BLUEBERRY SIZE AMOUNT TO INSIDE OF VAGINA THREE TIMES A WEEK., Disp: , Rfl:     HYDROcodone-acetaminophen (NORCO) 7.5-325 MG per tablet, Take 1 tablet by mouth Every 6 (Six) Hours As Needed., Disp: , Rfl:     magnesium chloride ER 64 MG DR tablet, Take 1 tablet by mouth Daily., Disp: , Rfl:     meloxicam (MOBIC) 15 MG tablet, Take 1 tablet by mouth Daily., Disp: , Rfl:     miglitol (GLYSET) 25 MG tablet, Take 1 tablet by mouth Daily., Disp: 90 tablet, Rfl: 1    multivitamin with minerals tablet tablet, Take 1 tablet by mouth Daily., Disp: , Rfl:     omeprazole (priLOSEC) 40 MG capsule, Take 1 capsule by mouth Daily., Disp: , Rfl:     Probiotic Product (UP4 PROBIOTICS PO), Take 1 tablet by mouth Daily., Disp: , Rfl:     SITagliptin (Januvia) 50 MG tablet, Take 1 tablet by mouth Daily., Disp: 90 tablet, Rfl: 1    traMADol (ULTRAM) 50 MG tablet, Take 1 tablet by mouth Every 8 (Eight) Hours As Needed for Moderate Pain., Disp: , Rfl:     glucose blood test strip, 1 each by Other route Every Other Day., Disp: 100 each, Rfl: 5    glucose monitor monitoring kit, Use 1 each Every Other Day., Disp: 1 each, Rfl: 0    Lancets misc, Use 1 each Every Other Day., Disp: 100 each, Rfl: 5    lidocaine (LIDODERM) 5 %, APPLY 1 TO 2 PATCHES TOPICALLY TO THE SKIN DAILY. MAY WEAR UP TO 12 HOURS, Disp: , Rfl:     ==========================================================================    ALLERGIES    Allergies   Allergen Reactions    Sulfate Unknown - Low Severity    Sulfa Antibiotics Rash       ==========================================================================    OBJECTIVE    Vitals:    04/14/25 0924   BP: 132/75   Pulse: 77   SpO2: 97%     Body mass index is 24.8 kg/m².     General: Alert, cooperative, no acute distress  Lungs: Clear to auscultation bilaterally, respirations unlabored  Heart: Regular rate and rhythm, S1 and S2 normal, no murmur, rub or gallop  Abdomen: Soft, NT, ND and Bowel sounds Positive  Extremities:   "Extremities normal, atraumatic, no cyanosis or edema    ==========================================================================    LAB EVALUATION    Lab Results   Component Value Date    GLUCOSE 93 04/07/2025    BUN 17 04/07/2025    CREATININE 0.76 04/07/2025    EGFRIFNONA >60 08/01/2022    EGFRIFAFRI >60 09/20/2022    BCR 22 04/07/2025    K 4.6 04/07/2025    CO2 26 04/07/2025    CALCIUM 9.4 04/07/2025    ALBUMIN 4.6 04/07/2025    LABIL2 1.5 07/06/2022    AST 29 04/07/2025    ALT 29 04/07/2025    CHOL 146 01/09/2020    TRIG 126 06/25/2020    HDL 62 06/25/2020    LDL 82 06/25/2020     Lab Results   Component Value Date    HGBA1C 6.4 (H) 04/07/2025    HGBA1C 6.30 (H) 10/18/2024    HGBA1C 6.90 (H) 01/24/2024     Lab Results   Component Value Date    CREATININE 0.76 04/07/2025     Lab Results   Component Value Date    TSH 0.780 01/24/2024    FREET4 1.08 01/24/2024     OGTT  7/21/2023  Fasting - 110  1 Hour - 250  2 Hour - 221  3 Hour - 134  4 Hour - 59    ==========================================================================    ASSESSMENT AND PLAN    # Reactive hypoglycemia with T2DM  # Chronic recurrent UTIs  # Hypertension  # Hyperlipidemia  # Atrial fibrillation    Plan:  -Will continue placid therapy and will also continue Januvia therapy without any changes  - Prescription written for glucometer and supplies again  - Lab work in 6 months time with clinical follow-up  - Therapy as follows:  Glyset 25 mg once a day in the morning  Januvia 50 mg p.o. daily  - Microalbumin before next visit  - Patient to have repeat eye exam    Return to clinic: 6 months    Entire assessment and plan was discussed and counseled the patient in detail to which patient verbalized understanding and agreed with care.  Answered all queries and concerns.    This note was created using voice recognition software and is inherently subject to errors including those of syntax and \"sound-alike\" substitutions which may escape proofreading.  " In such instances, original meaning may be extrapolated by contextual derivation.    ==========================================================================    INFORMATION PROVIDED TO PATIENT    Patient Instructions   Please,    - Continue taking the following medication:  Glyset/Miglitol 25 mg once a day  Januvia 50 mg once a day    - Check your blood sugar 2-3 times a week and maintain logs    - Lab work before next visit    - Please make appointment to follow up with ophthalmology / eye doctor for yearly diabetes eye exam    Follow up in 6 months    Thank you for your visit today.    If you have any questions or concerns please feel free to reach out of the office.       ==========================================================================  Franklin Tsang MD  Department of Endocrine, Diabetes and Metabolism  [facility] [place]  ==========================================================================

## 2025-04-14 NOTE — PATIENT INSTRUCTIONS
Please,    - Continue taking the following medication:  Glyset/Miglitol 25 mg once a day  Januvia 50 mg once a day    - Check your blood sugar 2-3 times a week and maintain logs    - Lab work before next visit    - Please make appointment to follow up with ophthalmology / eye doctor for yearly diabetes eye exam    Follow up in 6 months    Thank you for your visit today.    If you have any questions or concerns please feel free to reach out of the office.

## 2025-04-16 DIAGNOSIS — E11.9 TYPE 2 DIABETES MELLITUS WITHOUT COMPLICATION, WITHOUT LONG-TERM CURRENT USE OF INSULIN: ICD-10-CM

## 2025-04-17 RX ORDER — SITAGLIPTIN 50 MG/1
50 TABLET, FILM COATED ORAL DAILY
Qty: 90 TABLET | Refills: 1 | Status: SHIPPED | OUTPATIENT
Start: 2025-04-17

## 2025-06-01 PROCEDURE — 87086 URINE CULTURE/COLONY COUNT: CPT | Performed by: PHYSICIAN ASSISTANT

## 2025-06-01 PROCEDURE — 87186 SC STD MICRODIL/AGAR DIL: CPT | Performed by: PHYSICIAN ASSISTANT

## 2025-06-01 PROCEDURE — 87088 URINE BACTERIA CULTURE: CPT | Performed by: PHYSICIAN ASSISTANT

## (undated) DEVICE — TRAP WIDEEYE POLYP

## (undated) DEVICE — SINGLE-USE BIOPSY FORCEPS: Brand: RADIAL JAW 4

## (undated) DEVICE — SNAR POLYP HOTSNARE/BRAIDED OVL/MINI 7F 2.8X10MM 230CM 1P/U

## (undated) DEVICE — PK ENDO GI 50